# Patient Record
Sex: FEMALE | Race: BLACK OR AFRICAN AMERICAN
[De-identification: names, ages, dates, MRNs, and addresses within clinical notes are randomized per-mention and may not be internally consistent; named-entity substitution may affect disease eponyms.]

---

## 2017-01-12 ENCOUNTER — APPOINTMENT (OUTPATIENT)
Dept: ENDOCRINOLOGY | Facility: CLINIC | Age: 47
End: 2017-01-12

## 2017-01-30 ENCOUNTER — APPOINTMENT (OUTPATIENT)
Dept: INTERNAL MEDICINE | Facility: CLINIC | Age: 47
End: 2017-01-30

## 2017-01-30 VITALS
HEART RATE: 88 BPM | BODY MASS INDEX: 24.99 KG/M2 | DIASTOLIC BLOOD PRESSURE: 83 MMHG | SYSTOLIC BLOOD PRESSURE: 156 MMHG | HEIGHT: 65 IN | WEIGHT: 150 LBS

## 2017-01-30 DIAGNOSIS — Z98.890 OTHER SPECIFIED POSTPROCEDURAL STATES: ICD-10-CM

## 2017-01-30 DIAGNOSIS — Z87.19 OTHER SPECIFIED POSTPROCEDURAL STATES: ICD-10-CM

## 2017-03-02 ENCOUNTER — RESULT REVIEW (OUTPATIENT)
Age: 47
End: 2017-03-02

## 2017-03-02 ENCOUNTER — APPOINTMENT (OUTPATIENT)
Dept: ENDOCRINOLOGY | Facility: CLINIC | Age: 47
End: 2017-03-02

## 2017-03-02 VITALS — WEIGHT: 148 LBS | BODY MASS INDEX: 24.66 KG/M2 | HEIGHT: 65 IN

## 2017-03-02 DIAGNOSIS — F17.200 NICOTINE DEPENDENCE, UNSPECIFIED, UNCOMPLICATED: ICD-10-CM

## 2017-03-03 LAB
ALBUMIN SERPL-MCNC: 4.2 G/DL
ALBUMIN/GLOB SERPL: 1.4
ALP SERPL-CCNC: 64 IU/L
ALT SERPL-CCNC: 72 IU/L
ANION GAP SERPL CALC-SCNC: 12 MEQ/L
AST SERPL-CCNC: 56 IU/L
BASOPHILS # BLD: 0.02 TH/MM3
BASOPHILS NFR BLD: 0.4 %
BILIRUB SERPL-MCNC: 1.3 MG/DL
BUN SERPL-MCNC: 3 MG/DL
BUN/CREAT SERPL: 3.9 %
CALCIUM SERPL-MCNC: 9.7 MG/DL
CHLORIDE SERPL-SCNC: 108 MEQ/L
CO2 SERPL-SCNC: 19 MEQ/L
CREAT SERPL-MCNC: 0.77 MG/DL
EOSINOPHIL # BLD: 0.18 TH/MM3
EOSINOPHIL NFR BLD: 3.3 %
ERYTHROCYTE [DISTWIDTH] IN BLOOD BY AUTOMATED COUNT: 13.2 %
GFR SERPL CREATININE-BSD FRML MDRD: 97
GLUCOSE SERPL-MCNC: 99 MG/DL
GRANULOCYTES # BLD: 3.3 TH/MM3
GRANULOCYTES NFR BLD: 60.2 %
HCT VFR BLD AUTO: 38.2 %
HGB BLD-MCNC: 13.3 G/DL
IMM GRANULOCYTES # BLD: 0.01 TH/MM3
IMM GRANULOCYTES NFR BLD: 0.2 %
LYMPHOCYTES # BLD: 1.53 TH/MM3
LYMPHOCYTES NFR BLD: 27.9 %
MCH RBC QN AUTO: 34.9 PG
MCHC RBC AUTO-ENTMCNC: 34.8 G/DL
MCV RBC AUTO: 100.3 FL
MONOCYTES # BLD: 0.44 TH/MM3
MONOCYTES NFR BLD: 8 %
PLATELET # BLD: 320 TH/MM3
PMV BLD AUTO: 10.6 FL
POTASSIUM SERPL-SCNC: 4.2 MMOL/L
PROT SERPL-MCNC: 7.2 G/DL
RBC # BLD AUTO: 3.81 MIL/MM3
SODIUM SERPL-SCNC: 139 MEQ/L
T3 SERPL-MCNC: 154 NG/DL
T4 FREE SERPL-MCNC: 0.7 NG/DL
TSH SERPL DL<=0.005 MIU/L-ACNC: 2.31 UIU/ML
WBC # BLD: 5.48 TH/MM3

## 2017-04-05 ENCOUNTER — OTHER (OUTPATIENT)
Age: 47
End: 2017-04-05

## 2017-04-19 ENCOUNTER — APPOINTMENT (OUTPATIENT)
Dept: OBGYN | Facility: CLINIC | Age: 47
End: 2017-04-19

## 2017-04-19 ENCOUNTER — OTHER (OUTPATIENT)
Age: 47
End: 2017-04-19

## 2017-04-23 ENCOUNTER — FORM ENCOUNTER (OUTPATIENT)
Age: 47
End: 2017-04-23

## 2017-04-26 ENCOUNTER — CLINICAL ADVICE (OUTPATIENT)
Age: 47
End: 2017-04-26

## 2017-04-28 ENCOUNTER — APPOINTMENT (OUTPATIENT)
Dept: PULMONOLOGY | Facility: CLINIC | Age: 47
End: 2017-04-28

## 2017-04-28 VITALS
DIASTOLIC BLOOD PRESSURE: 75 MMHG | HEART RATE: 98 BPM | SYSTOLIC BLOOD PRESSURE: 121 MMHG | HEIGHT: 65 IN | WEIGHT: 151 LBS | BODY MASS INDEX: 25.16 KG/M2

## 2017-05-10 ENCOUNTER — APPOINTMENT (OUTPATIENT)
Dept: OBGYN | Facility: CLINIC | Age: 47
End: 2017-05-10

## 2017-06-09 ENCOUNTER — APPOINTMENT (OUTPATIENT)
Dept: ANTEPARTUM | Facility: CLINIC | Age: 47
End: 2017-06-09

## 2017-06-12 ENCOUNTER — APPOINTMENT (OUTPATIENT)
Dept: OBGYN | Facility: CLINIC | Age: 47
End: 2017-06-12

## 2017-06-12 VITALS — BODY MASS INDEX: 24.3 KG/M2 | DIASTOLIC BLOOD PRESSURE: 70 MMHG | SYSTOLIC BLOOD PRESSURE: 110 MMHG | WEIGHT: 146 LBS

## 2017-06-15 ENCOUNTER — OUTPATIENT (OUTPATIENT)
Dept: OUTPATIENT SERVICES | Facility: HOSPITAL | Age: 47
LOS: 1 days | Discharge: HOME | End: 2017-06-15

## 2017-06-15 DIAGNOSIS — J45.909 UNSPECIFIED ASTHMA, UNCOMPLICATED: ICD-10-CM

## 2017-06-15 DIAGNOSIS — J44.9 CHRONIC OBSTRUCTIVE PULMONARY DISEASE, UNSPECIFIED: ICD-10-CM

## 2017-06-15 DIAGNOSIS — I26.99 OTHER PULMONARY EMBOLISM WITHOUT ACUTE COR PULMONALE: ICD-10-CM

## 2017-06-15 DIAGNOSIS — D64.9 ANEMIA, UNSPECIFIED: ICD-10-CM

## 2017-06-16 ENCOUNTER — APPOINTMENT (OUTPATIENT)
Dept: INTERNAL MEDICINE | Facility: CLINIC | Age: 47
End: 2017-06-16

## 2017-06-28 DIAGNOSIS — Z01.818 ENCOUNTER FOR OTHER PREPROCEDURAL EXAMINATION: ICD-10-CM

## 2017-06-28 DIAGNOSIS — N92.0 EXCESSIVE AND FREQUENT MENSTRUATION WITH REGULAR CYCLE: ICD-10-CM

## 2017-06-28 DIAGNOSIS — N84.0 POLYP OF CORPUS UTERI: ICD-10-CM

## 2017-07-24 ENCOUNTER — APPOINTMENT (OUTPATIENT)
Dept: INTERNAL MEDICINE | Facility: CLINIC | Age: 47
End: 2017-07-24

## 2017-08-12 ENCOUNTER — EMERGENCY (EMERGENCY)
Facility: HOSPITAL | Age: 47
LOS: 0 days | Discharge: HOME | End: 2017-08-12
Admitting: INTERNAL MEDICINE

## 2017-08-12 DIAGNOSIS — J44.9 CHRONIC OBSTRUCTIVE PULMONARY DISEASE, UNSPECIFIED: ICD-10-CM

## 2017-08-12 DIAGNOSIS — D64.9 ANEMIA, UNSPECIFIED: ICD-10-CM

## 2017-08-12 DIAGNOSIS — I26.99 OTHER PULMONARY EMBOLISM WITHOUT ACUTE COR PULMONALE: ICD-10-CM

## 2017-08-12 DIAGNOSIS — Z79.51 LONG TERM (CURRENT) USE OF INHALED STEROIDS: ICD-10-CM

## 2017-08-12 DIAGNOSIS — K21.9 GASTRO-ESOPHAGEAL REFLUX DISEASE WITHOUT ESOPHAGITIS: ICD-10-CM

## 2017-08-12 DIAGNOSIS — R06.02 SHORTNESS OF BREATH: ICD-10-CM

## 2017-08-12 DIAGNOSIS — Z79.899 OTHER LONG TERM (CURRENT) DRUG THERAPY: ICD-10-CM

## 2017-08-12 DIAGNOSIS — J44.1 CHRONIC OBSTRUCTIVE PULMONARY DISEASE WITH (ACUTE) EXACERBATION: ICD-10-CM

## 2017-08-12 DIAGNOSIS — Z91.041 RADIOGRAPHIC DYE ALLERGY STATUS: ICD-10-CM

## 2017-08-12 DIAGNOSIS — F17.210 NICOTINE DEPENDENCE, CIGARETTES, UNCOMPLICATED: ICD-10-CM

## 2017-08-12 DIAGNOSIS — F41.8 OTHER SPECIFIED ANXIETY DISORDERS: ICD-10-CM

## 2017-08-12 DIAGNOSIS — M25.511 PAIN IN RIGHT SHOULDER: ICD-10-CM

## 2017-08-12 DIAGNOSIS — J45.909 UNSPECIFIED ASTHMA, UNCOMPLICATED: ICD-10-CM

## 2017-08-28 ENCOUNTER — APPOINTMENT (OUTPATIENT)
Dept: INTERNAL MEDICINE | Facility: CLINIC | Age: 47
End: 2017-08-28

## 2017-10-05 ENCOUNTER — APPOINTMENT (OUTPATIENT)
Dept: INTERNAL MEDICINE | Facility: CLINIC | Age: 47
End: 2017-10-05

## 2017-10-05 ENCOUNTER — OUTPATIENT (OUTPATIENT)
Dept: OUTPATIENT SERVICES | Facility: HOSPITAL | Age: 47
LOS: 1 days | Discharge: HOME | End: 2017-10-05

## 2017-10-05 VITALS
SYSTOLIC BLOOD PRESSURE: 118 MMHG | HEIGHT: 65 IN | DIASTOLIC BLOOD PRESSURE: 77 MMHG | BODY MASS INDEX: 25.33 KG/M2 | HEART RATE: 106 BPM | WEIGHT: 152 LBS

## 2017-10-05 DIAGNOSIS — I26.99 OTHER PULMONARY EMBOLISM WITHOUT ACUTE COR PULMONALE: ICD-10-CM

## 2017-10-05 DIAGNOSIS — J45.909 UNSPECIFIED ASTHMA, UNCOMPLICATED: ICD-10-CM

## 2017-10-05 DIAGNOSIS — J44.9 CHRONIC OBSTRUCTIVE PULMONARY DISEASE, UNSPECIFIED: ICD-10-CM

## 2017-10-05 DIAGNOSIS — D64.9 ANEMIA, UNSPECIFIED: ICD-10-CM

## 2017-10-05 RX ORDER — CHLORHEXIDINE GLUCONATE 4 %
325 (65 FE) LIQUID (ML) TOPICAL 3 TIMES DAILY
Qty: 90 | Refills: 6 | Status: COMPLETED | COMMUNITY
Start: 2017-01-30 | End: 2017-10-05

## 2017-10-05 RX ORDER — SERTRALINE HYDROCHLORIDE 50 MG/1
50 TABLET, FILM COATED ORAL
Qty: 30 | Refills: 0 | Status: COMPLETED | COMMUNITY
Start: 2017-01-31 | End: 2017-10-05

## 2017-10-10 DIAGNOSIS — N84.0 POLYP OF CORPUS UTERI: ICD-10-CM

## 2017-10-10 DIAGNOSIS — E05.90 THYROTOXICOSIS, UNSPECIFIED WITHOUT THYROTOXIC CRISIS OR STORM: ICD-10-CM

## 2017-10-10 DIAGNOSIS — G56.00 CARPAL TUNNEL SYNDROME, UNSPECIFIED UPPER LIMB: ICD-10-CM

## 2017-10-26 LAB — CYTOLOGY CVX/VAG DOC THIN PREP: NORMAL

## 2017-10-30 LAB
ALBUMIN SERPL-MCNC: 3.8 G/DL
ALBUMIN/GLOB SERPL: 1.19
ALP SERPL-CCNC: 62 IU/L
ALT SERPL-CCNC: 26 IU/L
ANION GAP SERPL CALC-SCNC: 7 MEQ/L
AST SERPL-CCNC: 25 IU/L
BASOPHILS # BLD: 0.02 TH/MM3
BASOPHILS NFR BLD: 0.4 %
BILIRUB SERPL-MCNC: 0.8 MG/DL
BUN SERPL-MCNC: 6 MG/DL
BUN/CREAT SERPL: 9.2 %
CALCIUM SERPL-MCNC: 9.5 MG/DL
CHLORIDE SERPL-SCNC: 106 MEQ/L
CHOLEST SERPL-MCNC: 132 MG/DL
CO2 SERPL-SCNC: 25 MEQ/L
CREAT SERPL-MCNC: 0.65 MG/DL
DIFFERENTIAL METHOD BLD: NORMAL
EOSINOPHIL # BLD: 0.29 TH/MM3
EOSINOPHIL NFR BLD: 5.8 %
ERYTHROCYTE [DISTWIDTH] IN BLOOD BY AUTOMATED COUNT: 13.1 %
ESTIMATED AVERGAGE GLUCOSE (NORTH): 94 MG/DL
GFR SERPL CREATININE-BSD FRML MDRD: 117
GLUCOSE SERPL-MCNC: 91 MG/DL
GRANULOCYTES # BLD: 2.3 TH/MM3
GRANULOCYTES NFR BLD: 45.9 %
HBA1C MFR BLD: 4.9 %
HCT VFR BLD AUTO: 40.9 %
HDLC SERPL-MCNC: 97 MG/DL
HDLC SERPL: 1.36
HGB BLD-MCNC: 13.4 G/DL
IMM GRANULOCYTES # BLD: 0 TH/MM3
IMM GRANULOCYTES NFR BLD: 0 %
LDLC SERPL DIRECT ASSAY-MCNC: 19 MG/DL
LYMPHOCYTES # BLD: 1.98 TH/MM3
LYMPHOCYTES NFR BLD: 39.5 %
MCH RBC QN AUTO: 31.7 PG
MCHC RBC AUTO-ENTMCNC: 32.8 G/DL
MCV RBC AUTO: 96.7 FL
MONOCYTES # BLD: 0.42 TH/MM3
MONOCYTES NFR BLD: 8.4 %
PLATELET # BLD: 268 TH/MM3
PMV BLD AUTO: 10.5 FL
POTASSIUM SERPL-SCNC: 4.2 MMOL/L
PROT SERPL-MCNC: 7 G/DL
RBC # BLD AUTO: 4.23 MIL/MM3
SODIUM SERPL-SCNC: 138 MEQ/L
TRIGL SERPL-MCNC: 73 MG/DL
VLDLC SERPL-MCNC: 14 MG/DL
WBC # BLD: 5.01 TH/MM3

## 2017-10-31 LAB
25(OH)D3 SERPL-MCNC: 29 NG/ML
VITAMIN D2 SERPL-MCNC: <4 NG/ML
VITAMIN D3 SERPL-MCNC: 29 NG/ML

## 2017-11-02 ENCOUNTER — APPOINTMENT (OUTPATIENT)
Dept: INTERNAL MEDICINE | Facility: CLINIC | Age: 47
End: 2017-11-02

## 2017-11-02 ENCOUNTER — OUTPATIENT (OUTPATIENT)
Dept: OUTPATIENT SERVICES | Facility: HOSPITAL | Age: 47
LOS: 1 days | Discharge: HOME | End: 2017-11-02

## 2017-11-02 VITALS
SYSTOLIC BLOOD PRESSURE: 114 MMHG | HEIGHT: 65 IN | BODY MASS INDEX: 24.83 KG/M2 | HEART RATE: 114 BPM | DIASTOLIC BLOOD PRESSURE: 74 MMHG | WEIGHT: 149 LBS

## 2017-11-02 DIAGNOSIS — F33.2 MAJOR DEPRESSIVE DISORDER, RECURRENT SEVERE W/OUT PSYCHOTIC FEATURES: ICD-10-CM

## 2017-11-02 DIAGNOSIS — J44.9 CHRONIC OBSTRUCTIVE PULMONARY DISEASE, UNSPECIFIED: ICD-10-CM

## 2017-11-02 DIAGNOSIS — R20.0 ANESTHESIA OF SKIN: ICD-10-CM

## 2017-11-02 DIAGNOSIS — R20.2 ANESTHESIA OF SKIN: ICD-10-CM

## 2017-11-02 DIAGNOSIS — F14.11 COCAINE ABUSE, IN REMISSION: ICD-10-CM

## 2017-11-02 DIAGNOSIS — I26.99 OTHER PULMONARY EMBOLISM WITHOUT ACUTE COR PULMONALE: ICD-10-CM

## 2017-11-02 DIAGNOSIS — D64.9 ANEMIA, UNSPECIFIED: ICD-10-CM

## 2017-11-02 DIAGNOSIS — J45.909 UNSPECIFIED ASTHMA, UNCOMPLICATED: ICD-10-CM

## 2017-11-02 DIAGNOSIS — N92.6 IRREGULAR MENSTRUATION, UNSPECIFIED: ICD-10-CM

## 2017-11-02 DIAGNOSIS — N93.9 IRREGULAR MENSTRUATION, UNSPECIFIED: ICD-10-CM

## 2017-11-02 RX ORDER — OMEPRAZOLE MAGNESIUM 20 MG/1
20 TABLET, DELAYED RELEASE ORAL DAILY
Qty: 30 | Refills: 3 | Status: DISCONTINUED | COMMUNITY
Start: 2017-10-12 | End: 2017-11-02

## 2017-11-02 RX ORDER — ESOMEPRAZOLE MAGNESIUM 20 MG/1
20 CAPSULE, DELAYED RELEASE ORAL DAILY
Qty: 28 | Refills: 0 | Status: DISCONTINUED | COMMUNITY
Start: 2017-10-11 | End: 2017-11-02

## 2017-11-03 DIAGNOSIS — R01.1 CARDIAC MURMUR, UNSPECIFIED: ICD-10-CM

## 2017-11-03 DIAGNOSIS — J45.998 OTHER ASTHMA: ICD-10-CM

## 2017-11-03 DIAGNOSIS — G56.00 CARPAL TUNNEL SYNDROME, UNSPECIFIED UPPER LIMB: ICD-10-CM

## 2017-11-10 ENCOUNTER — APPOINTMENT (OUTPATIENT)
Dept: PULMONOLOGY | Facility: CLINIC | Age: 47
End: 2017-11-10

## 2017-12-25 ENCOUNTER — EMERGENCY (EMERGENCY)
Facility: HOSPITAL | Age: 47
LOS: 0 days | Discharge: HOME | End: 2017-12-25
Admitting: INTERNAL MEDICINE

## 2017-12-25 DIAGNOSIS — Z91.041 RADIOGRAPHIC DYE ALLERGY STATUS: ICD-10-CM

## 2017-12-25 DIAGNOSIS — J45.909 UNSPECIFIED ASTHMA, UNCOMPLICATED: ICD-10-CM

## 2017-12-25 DIAGNOSIS — F17.200 NICOTINE DEPENDENCE, UNSPECIFIED, UNCOMPLICATED: ICD-10-CM

## 2017-12-25 DIAGNOSIS — R05 COUGH: ICD-10-CM

## 2017-12-25 DIAGNOSIS — Z79.51 LONG TERM (CURRENT) USE OF INHALED STEROIDS: ICD-10-CM

## 2017-12-25 DIAGNOSIS — D64.9 ANEMIA, UNSPECIFIED: ICD-10-CM

## 2017-12-25 DIAGNOSIS — Z79.899 OTHER LONG TERM (CURRENT) DRUG THERAPY: ICD-10-CM

## 2017-12-25 DIAGNOSIS — J06.9 ACUTE UPPER RESPIRATORY INFECTION, UNSPECIFIED: ICD-10-CM

## 2017-12-25 DIAGNOSIS — J44.9 CHRONIC OBSTRUCTIVE PULMONARY DISEASE, UNSPECIFIED: ICD-10-CM

## 2017-12-25 DIAGNOSIS — I26.99 OTHER PULMONARY EMBOLISM WITHOUT ACUTE COR PULMONALE: ICD-10-CM

## 2017-12-25 DIAGNOSIS — F32.9 MAJOR DEPRESSIVE DISORDER, SINGLE EPISODE, UNSPECIFIED: ICD-10-CM

## 2017-12-25 DIAGNOSIS — K21.9 GASTRO-ESOPHAGEAL REFLUX DISEASE WITHOUT ESOPHAGITIS: ICD-10-CM

## 2018-02-07 ENCOUNTER — APPOINTMENT (OUTPATIENT)
Dept: ENDOCRINOLOGY | Facility: CLINIC | Age: 48
End: 2018-02-07

## 2018-02-10 ENCOUNTER — EMERGENCY (EMERGENCY)
Facility: HOSPITAL | Age: 48
LOS: 0 days | Discharge: HOME | End: 2018-02-11
Attending: EMERGENCY MEDICINE | Admitting: INTERNAL MEDICINE

## 2018-02-10 VITALS
DIASTOLIC BLOOD PRESSURE: 66 MMHG | RESPIRATION RATE: 22 BRPM | TEMPERATURE: 97 F | SYSTOLIC BLOOD PRESSURE: 112 MMHG | HEART RATE: 114 BPM | OXYGEN SATURATION: 95 %

## 2018-02-10 DIAGNOSIS — E03.9 HYPOTHYROIDISM, UNSPECIFIED: ICD-10-CM

## 2018-02-10 DIAGNOSIS — F17.210 NICOTINE DEPENDENCE, CIGARETTES, UNCOMPLICATED: ICD-10-CM

## 2018-02-10 DIAGNOSIS — R06.02 SHORTNESS OF BREATH: ICD-10-CM

## 2018-02-10 DIAGNOSIS — J44.1 CHRONIC OBSTRUCTIVE PULMONARY DISEASE WITH (ACUTE) EXACERBATION: ICD-10-CM

## 2018-02-10 DIAGNOSIS — J10.1 INFLUENZA DUE TO OTHER IDENTIFIED INFLUENZA VIRUS WITH OTHER RESPIRATORY MANIFESTATIONS: ICD-10-CM

## 2018-02-11 VITALS
SYSTOLIC BLOOD PRESSURE: 129 MMHG | HEART RATE: 131 BPM | DIASTOLIC BLOOD PRESSURE: 63 MMHG | OXYGEN SATURATION: 95 % | RESPIRATION RATE: 24 BRPM | TEMPERATURE: 99 F

## 2018-02-11 LAB
BASOPHILS # BLD AUTO: 0.04 K/UL — SIGNIFICANT CHANGE UP (ref 0–0.2)
BASOPHILS NFR BLD AUTO: 0.7 % — SIGNIFICANT CHANGE UP (ref 0–1)
CK MB BLD-MCNC: 2 % — SIGNIFICANT CHANGE UP (ref 0–4)
CK MB CFR SERPL CALC: 2.7 NG/ML — SIGNIFICANT CHANGE UP (ref 0.6–6.3)
CK SERPL-CCNC: 141 U/L — SIGNIFICANT CHANGE UP (ref 0–225)
EOSINOPHIL # BLD AUTO: 0.25 K/UL — SIGNIFICANT CHANGE UP (ref 0–0.7)
EOSINOPHIL NFR BLD AUTO: 4.5 % — SIGNIFICANT CHANGE UP (ref 0–8)
FLU A RESULT: POSITIVE
FLU A RESULT: POSITIVE
FLUAV AG NPH QL: POSITIVE
FLUBV AG NPH QL: NEGATIVE — SIGNIFICANT CHANGE UP
HCT VFR BLD CALC: 34.4 % — LOW (ref 37–47)
HGB BLD-MCNC: 11.6 G/DL — LOW (ref 14–18)
IMM GRANULOCYTES NFR BLD AUTO: 0.2 % — SIGNIFICANT CHANGE UP (ref 0.1–0.3)
LYMPHOCYTES # BLD AUTO: 1.32 K/UL — SIGNIFICANT CHANGE UP (ref 1.2–3.4)
LYMPHOCYTES # BLD AUTO: 23.9 % — SIGNIFICANT CHANGE UP (ref 20.5–51.1)
MAGNESIUM SERPL-MCNC: 1.9 MG/DL — SIGNIFICANT CHANGE UP (ref 1.8–2.4)
MCHC RBC-ENTMCNC: 30.6 PG — SIGNIFICANT CHANGE UP (ref 27–31)
MCHC RBC-ENTMCNC: 33.7 G/DL — SIGNIFICANT CHANGE UP (ref 32–37)
MCV RBC AUTO: 90.8 FL — SIGNIFICANT CHANGE UP (ref 81–91)
MONOCYTES # BLD AUTO: 0.56 K/UL — SIGNIFICANT CHANGE UP (ref 0.1–0.6)
MONOCYTES NFR BLD AUTO: 10.1 % — HIGH (ref 1.7–9.3)
NEUTROPHILS # BLD AUTO: 3.34 K/UL — SIGNIFICANT CHANGE UP (ref 1.4–6.5)
NEUTROPHILS NFR BLD AUTO: 60.6 % — SIGNIFICANT CHANGE UP (ref 42.2–75.2)
PLATELET # BLD AUTO: 247 K/UL — SIGNIFICANT CHANGE UP (ref 130–400)
RBC # BLD: 3.79 M/UL — LOW (ref 4.2–5.4)
RBC # FLD: 12.8 % — SIGNIFICANT CHANGE UP (ref 11.5–14.5)
TROPONIN I SERPL-MCNC: <0.02 NG/ML — SIGNIFICANT CHANGE UP (ref 0–0.05)
WBC # BLD: 5.52 K/UL — SIGNIFICANT CHANGE UP (ref 4.8–10.8)
WBC # FLD AUTO: 5.52 K/UL — SIGNIFICANT CHANGE UP (ref 4.8–10.8)

## 2018-02-11 RX ORDER — AZITHROMYCIN 500 MG/1
500 TABLET, FILM COATED ORAL ONCE
Qty: 0 | Refills: 0 | Status: DISCONTINUED | OUTPATIENT
Start: 2018-02-11 | End: 2018-02-11

## 2018-02-11 RX ORDER — IPRATROPIUM/ALBUTEROL SULFATE 18-103MCG
3 AEROSOL WITH ADAPTER (GRAM) INHALATION ONCE
Qty: 0 | Refills: 0 | Status: COMPLETED | OUTPATIENT
Start: 2018-02-11 | End: 2018-02-11

## 2018-02-11 RX ORDER — CEFTRIAXONE 500 MG/1
1 INJECTION, POWDER, FOR SOLUTION INTRAMUSCULAR; INTRAVENOUS ONCE
Qty: 0 | Refills: 0 | Status: DISCONTINUED | OUTPATIENT
Start: 2018-02-11 | End: 2018-02-11

## 2018-02-11 RX ORDER — SODIUM CHLORIDE 9 MG/ML
1000 INJECTION INTRAMUSCULAR; INTRAVENOUS; SUBCUTANEOUS ONCE
Qty: 0 | Refills: 0 | Status: COMPLETED | OUTPATIENT
Start: 2018-02-11 | End: 2018-02-11

## 2018-02-11 RX ORDER — ALBUTEROL 90 UG/1
2.5 AEROSOL, METERED ORAL ONCE
Qty: 0 | Refills: 0 | Status: COMPLETED | OUTPATIENT
Start: 2018-02-11 | End: 2018-02-11

## 2018-02-11 RX ADMIN — Medication 60 MILLIGRAM(S): at 01:58

## 2018-02-11 RX ADMIN — ALBUTEROL 2.5 MILLIGRAM(S): 90 AEROSOL, METERED ORAL at 04:58

## 2018-02-11 RX ADMIN — Medication 3 MILLILITER(S): at 01:59

## 2018-02-11 RX ADMIN — SODIUM CHLORIDE 1000 MILLILITER(S): 9 INJECTION INTRAMUSCULAR; INTRAVENOUS; SUBCUTANEOUS at 01:58

## 2018-02-11 NOTE — ED PROVIDER NOTE - OBJECTIVE STATEMENT
47 yr old female, PMH of COPD, presenting with dry cough, wheezing, and SOB since Thursday, denies any fevers, chills, rash, chest pain, productive sputum, N/V/D, abd pain, or LE swelling. Has never been intubated for her COPD, sees Dr. Bojorquez. Used to smoke 3ppd, now only 1 pack every 3 days

## 2018-02-11 NOTE — ED PROVIDER NOTE - NS ED ROS FT
Review of Systems:  	•	CONSTITUTIONAL - no fever, no diaphoresis, no chills  	•	EYES - no eye pain, no blurry vision  	•	ENT - no change in hearing, no sore throat, no ear pain or tinnitus  	•	RESPIRATORY - +SOB  	•	CARDIAC - no chest pain, no palpitations  	•	GI - no abd pain, no nausea, no vomiting, no diarrhea, no constipation  	•	GENITO-URINARY - no discharge, no dysuria; no hematuria  	•	MUSCULOSKELETAL - no joint paint, no swelling, no redness

## 2018-02-11 NOTE — ED PROVIDER NOTE - ATTENDING CONTRIBUTION TO CARE
47 y.o. female comes in c/o cough, wheezing, SOB, and generalized fatigue. On exam, pt in NAD, AAOx3, head NC/AT, CN II-XII intact, lungs wheezing B/L, CV S1S2 regular, abdomen soft/NT/ND/(+)BS, ext (-) edema. motor 5/5x4, sensation intact. Will do XR, labs, and reevaluate.

## 2018-02-11 NOTE — ED ADULT NURSE NOTE - PMH
Anemia    COPD (chronic obstructive pulmonary disease)    GERD (gastroesophageal reflux disease)    Hyperthyroidism

## 2018-02-11 NOTE — ED ADULT NURSE REASSESSMENT NOTE - NS ED NURSE REASSESS COMMENT FT1
pt continues to have shortness of breath, placed on 2L NC. no change after treatment. wheezing still present upon auscultation. will continue to monitor

## 2018-02-11 NOTE — ED PROVIDER NOTE - PHYSICAL EXAMINATION
Alert, NAD, WDWN, non-toxic appearing  PERRL, EOMI, normal pupils, no icterus, normal external ENT, pink/moist membranes, pharynx normal  Airway intact, normal resp effort w/o tachypnea, speaking full sentences, lung with good air entry bilaterally, minimal wheezing on expiration  CVS1S2, RRR, no m/g/r, no JVD, 2+ pulses b/l, no edema, warm/well-perfused  Abdomen soft, no tender/dist/guard/rebound, no CVA tender  FROM all 4 ext, no bony tender/deform, skin warm/dry, no rash  AAOx3, CN 2-12 intact, normal motor, normal sensory, normal gait

## 2018-02-12 LAB
ALBUMIN SERPL ELPH-MCNC: 3.5 G/DL — SIGNIFICANT CHANGE UP (ref 3–5.5)
ALP SERPL-CCNC: 85 U/L — SIGNIFICANT CHANGE UP (ref 30–115)
ALT FLD-CCNC: 28 U/L — SIGNIFICANT CHANGE UP (ref 0–41)
ANION GAP SERPL CALC-SCNC: 11 MMOL/L — SIGNIFICANT CHANGE UP (ref 7–14)
AST SERPL-CCNC: 26 U/L — SIGNIFICANT CHANGE UP (ref 0–41)
BILIRUB DIRECT SERPL-MCNC: SIGNIFICANT CHANGE UP MG/DL (ref 0–0.2)
BILIRUB INDIRECT FLD-MCNC: SIGNIFICANT CHANGE UP MG/DL
BILIRUB SERPL-MCNC: 0.4 MG/DL — SIGNIFICANT CHANGE UP (ref 0.2–1.2)
BUN SERPL-MCNC: <5 MG/DL — LOW (ref 10–20)
CALCIUM SERPL-MCNC: 8.5 MG/DL — SIGNIFICANT CHANGE UP (ref 8.5–10.1)
CHLORIDE SERPL-SCNC: 102 MMOL/L — SIGNIFICANT CHANGE UP (ref 98–110)
CO2 SERPL-SCNC: 25 MMOL/L — SIGNIFICANT CHANGE UP (ref 17–32)
CREAT SERPL-MCNC: 0.5 MG/DL — LOW (ref 0.7–1.5)
GLUCOSE SERPL-MCNC: 123 MG/DL — HIGH (ref 70–110)
POTASSIUM SERPL-MCNC: 3.7 MMOL/L — SIGNIFICANT CHANGE UP (ref 3.5–5)
POTASSIUM SERPL-SCNC: 3.7 MMOL/L — SIGNIFICANT CHANGE UP (ref 3.5–5)
PROT SERPL-MCNC: 6.8 G/DL — SIGNIFICANT CHANGE UP (ref 6–8)
SODIUM SERPL-SCNC: 138 MMOL/L — SIGNIFICANT CHANGE UP (ref 135–146)

## 2018-04-04 ENCOUNTER — RX RENEWAL (OUTPATIENT)
Age: 48
End: 2018-04-04

## 2018-06-21 ENCOUNTER — OTHER (OUTPATIENT)
Age: 48
End: 2018-06-21

## 2018-06-25 ENCOUNTER — TRANSCRIPTION ENCOUNTER (OUTPATIENT)
Age: 48
End: 2018-06-25

## 2018-07-20 ENCOUNTER — TRANSCRIPTION ENCOUNTER (OUTPATIENT)
Age: 48
End: 2018-07-20

## 2018-07-20 PROBLEM — D64.9 ANEMIA, UNSPECIFIED: Chronic | Status: ACTIVE | Noted: 2018-02-11

## 2018-07-20 PROBLEM — E05.90 THYROTOXICOSIS, UNSPECIFIED WITHOUT THYROTOXIC CRISIS OR STORM: Chronic | Status: ACTIVE | Noted: 2018-02-11

## 2018-07-20 PROBLEM — K21.9 GASTRO-ESOPHAGEAL REFLUX DISEASE WITHOUT ESOPHAGITIS: Chronic | Status: ACTIVE | Noted: 2018-02-11

## 2018-07-20 PROBLEM — J44.9 CHRONIC OBSTRUCTIVE PULMONARY DISEASE, UNSPECIFIED: Chronic | Status: ACTIVE | Noted: 2018-02-11

## 2018-07-25 ENCOUNTER — RX RENEWAL (OUTPATIENT)
Age: 48
End: 2018-07-25

## 2018-08-01 ENCOUNTER — APPOINTMENT (OUTPATIENT)
Dept: ENDOCRINOLOGY | Facility: CLINIC | Age: 48
End: 2018-08-01

## 2018-08-02 ENCOUNTER — TRANSCRIPTION ENCOUNTER (OUTPATIENT)
Age: 48
End: 2018-08-02

## 2018-08-02 ENCOUNTER — EMERGENCY (EMERGENCY)
Facility: HOSPITAL | Age: 48
LOS: 0 days | Discharge: HOME | End: 2018-08-02
Attending: EMERGENCY MEDICINE | Admitting: EMERGENCY MEDICINE

## 2018-08-02 VITALS
DIASTOLIC BLOOD PRESSURE: 77 MMHG | SYSTOLIC BLOOD PRESSURE: 137 MMHG | HEART RATE: 125 BPM | TEMPERATURE: 97 F | OXYGEN SATURATION: 99 % | RESPIRATION RATE: 20 BRPM

## 2018-08-02 DIAGNOSIS — J44.9 CHRONIC OBSTRUCTIVE PULMONARY DISEASE, UNSPECIFIED: ICD-10-CM

## 2018-08-02 DIAGNOSIS — E03.9 HYPOTHYROIDISM, UNSPECIFIED: ICD-10-CM

## 2018-08-02 DIAGNOSIS — K21.9 GASTRO-ESOPHAGEAL REFLUX DISEASE WITHOUT ESOPHAGITIS: ICD-10-CM

## 2018-08-02 DIAGNOSIS — R06.02 SHORTNESS OF BREATH: ICD-10-CM

## 2018-08-02 DIAGNOSIS — Z87.891 PERSONAL HISTORY OF NICOTINE DEPENDENCE: ICD-10-CM

## 2018-08-02 RX ORDER — IPRATROPIUM/ALBUTEROL SULFATE 18-103MCG
3 AEROSOL WITH ADAPTER (GRAM) INHALATION
Qty: 0 | Refills: 0 | Status: COMPLETED | OUTPATIENT
Start: 2018-08-02 | End: 2018-08-02

## 2018-08-02 RX ORDER — IPRATROPIUM/ALBUTEROL SULFATE 18-103MCG
3 AEROSOL WITH ADAPTER (GRAM) INHALATION ONCE
Qty: 0 | Refills: 0 | Status: COMPLETED | OUTPATIENT
Start: 2018-08-02 | End: 2018-08-02

## 2018-08-02 RX ORDER — IPRATROPIUM/ALBUTEROL SULFATE 18-103MCG
3 AEROSOL WITH ADAPTER (GRAM) INHALATION
Qty: 9 | Refills: 0 | OUTPATIENT
Start: 2018-08-02 | End: 2018-08-04

## 2018-08-02 RX ADMIN — Medication 3 MILLILITER(S): at 17:00

## 2018-08-02 RX ADMIN — Medication 3 MILLILITER(S): at 16:00

## 2018-08-02 RX ADMIN — Medication 3 MILLILITER(S): at 18:55

## 2018-08-02 RX ADMIN — Medication 3 MILLILITER(S): at 16:19

## 2018-08-02 NOTE — ED PROVIDER NOTE - CARE PLAN
Principal Discharge DX:	COPD (chronic obstructive pulmonary disease) Principal Discharge DX:	Dyspnea  Secondary Diagnosis:	COPD (chronic obstructive pulmonary disease)

## 2018-08-02 NOTE — ED PROVIDER NOTE - PROGRESS NOTE DETAILS
ATTENDING NOTE: 48 y/o female with hx of COPD, recently quit smoking, c/o gradual onset SOB associated with non-productive cough x 3 days. No fever. NO chest pain. No vomiting. No recent travel/immobilization/surgery. No calf pain or swelling. O/E: Mild respiratory distress. No pallor, no jaundice. Neck supple, no meningeal signs. Lungs with diffuse wheezes b/l. Moving good air. S1 S2 tachycardic, no murmur. ABD soft, no tenderness. No pedal edema, no calf tenderness. No skin rash. No neurologic deficits.   Imp: Likely COPD exacerbation, no sign of PE. Unlikely to be CHF.  A/P: Nebulizers, already received prednisone 60 mg at urgent care center, check EKG, CXR. Will re-assess. Pt says she feels better. Wheezing resolved on repeat exam. States she would like to go home at this time. Has f/u with pulmonologist Dr. Bojorquez. Will give course of prednisone and instruct to f/u with pmd and pulm. Instructed to return if sx change or worsen.

## 2018-08-02 NOTE — ED PROVIDER NOTE - NS ED ROS FT
Eyes:  No visual changes, eye pain or discharge.  ENMT:  No hearing changes, pain, discharge or infections. No neck pain or stiffness.  Cardiac:  SOB. No chest pain or edema.   Respiratory: Cough, sob.   GI:  No nausea, vomiting, diarrhea or abdominal pain.  :  No dysuria, frequency or burning.  MS:  No myalgia, muscle weakness, joint pain or back pain.  Neuro:  No headache or weakness.  No LOC.  Skin:  No skin rash.   Endocrine: No history of thyroid disease or diabetes.

## 2018-08-02 NOTE — ED PROVIDER NOTE - MEDICAL DECISION MAKING DETAILS
Symptoms improved. Breathign comfortably. Lungs CTA b/l. . Walked around ED without exacerbation. D/C'ed with albuterol/atrovent via nebulizer, prednisone. Will f/u with PMD Fabrizio and pulmonary Dr. Bojorquez.

## 2018-08-02 NOTE — ED PROVIDER NOTE - PHYSICAL EXAMINATION
CONSTITUTIONAL: Well-developed; well-nourished; in no acute distress.   SKIN: warm, dry  HEAD: Normocephalic; atraumatic.  EYES: normal sclera and conjunctiva   ENT: No nasal discharge; airway clear.  NECK: Supple; non tender.  CARD: S1, S2 normal; no murmurs, gallops, or rubs. Regular rate and rhythm.   RESP: BL diffuse expiratory wheezing. No retractions, accessory muscle use, nasal flaring.   ABD: soft ntnd  EXT: Normal ROM.  No clubbing, cyanosis or edema. No posterior calf ttp.   LYMPH: No acute cervical adenopathy.  NEURO: Alert, oriented, grossly unremarkable  PSYCH: Cooperative, appropriate.

## 2018-08-02 NOTE — ED PROVIDER NOTE - OBJECTIVE STATEMENT
47 y f pmh copd pw sob. SOB x4 days. Associated with nonproductive cough. Has tried copd medications including rescue inhaler with no relief. [place] [facility] 60 [place] prednisone and told to come to ED. Denies cp, palpitations, abd pain, n/v, headache, LH, sob. Denies dvt/pe, recent travel/immobilization, recent surgery.

## 2018-08-17 ENCOUNTER — TRANSCRIPTION ENCOUNTER (OUTPATIENT)
Age: 48
End: 2018-08-17

## 2018-08-23 ENCOUNTER — APPOINTMENT (OUTPATIENT)
Dept: INTERNAL MEDICINE | Facility: CLINIC | Age: 48
End: 2018-08-23

## 2018-08-23 ENCOUNTER — OUTPATIENT (OUTPATIENT)
Dept: OUTPATIENT SERVICES | Facility: HOSPITAL | Age: 48
LOS: 1 days | Discharge: HOME | End: 2018-08-23

## 2018-08-23 VITALS
HEIGHT: 65 IN | BODY MASS INDEX: 31.49 KG/M2 | DIASTOLIC BLOOD PRESSURE: 93 MMHG | HEART RATE: 116 BPM | SYSTOLIC BLOOD PRESSURE: 162 MMHG | WEIGHT: 189 LBS

## 2018-09-03 ENCOUNTER — INPATIENT (INPATIENT)
Facility: HOSPITAL | Age: 48
LOS: 0 days | Discharge: HOME | End: 2018-09-04
Attending: HOSPITALIST | Admitting: HOSPITALIST
Payer: COMMERCIAL

## 2018-09-03 ENCOUNTER — TRANSCRIPTION ENCOUNTER (OUTPATIENT)
Age: 48
End: 2018-09-03

## 2018-09-03 VITALS
OXYGEN SATURATION: 99 % | TEMPERATURE: 97 F | SYSTOLIC BLOOD PRESSURE: 169 MMHG | DIASTOLIC BLOOD PRESSURE: 106 MMHG | RESPIRATION RATE: 24 BRPM | WEIGHT: 192.02 LBS | HEART RATE: 111 BPM

## 2018-09-03 LAB
ALBUMIN SERPL ELPH-MCNC: 4.2 G/DL — SIGNIFICANT CHANGE UP (ref 3.5–5.2)
ALP SERPL-CCNC: 77 U/L — SIGNIFICANT CHANGE UP (ref 30–115)
ALT FLD-CCNC: 14 U/L — SIGNIFICANT CHANGE UP (ref 0–41)
ANION GAP SERPL CALC-SCNC: 16 MMOL/L — HIGH (ref 7–14)
APTT BLD: 26 SEC — LOW (ref 27–39.2)
AST SERPL-CCNC: 17 U/L — SIGNIFICANT CHANGE UP (ref 0–41)
BASOPHILS # BLD AUTO: 0.06 K/UL — SIGNIFICANT CHANGE UP (ref 0–0.2)
BASOPHILS NFR BLD AUTO: 0.6 % — SIGNIFICANT CHANGE UP (ref 0–1)
BILIRUB SERPL-MCNC: 0.3 MG/DL — SIGNIFICANT CHANGE UP (ref 0.2–1.2)
BUN SERPL-MCNC: 6 MG/DL — LOW (ref 10–20)
CALCIUM SERPL-MCNC: 8.8 MG/DL — SIGNIFICANT CHANGE UP (ref 8.5–10.1)
CHLORIDE SERPL-SCNC: 97 MMOL/L — LOW (ref 98–110)
CO2 SERPL-SCNC: 25 MMOL/L — SIGNIFICANT CHANGE UP (ref 17–32)
CREAT SERPL-MCNC: 0.8 MG/DL — SIGNIFICANT CHANGE UP (ref 0.7–1.5)
D DIMER BLD IA.RAPID-MCNC: 123 NG/ML DDU — SIGNIFICANT CHANGE UP (ref 0–230)
EOSINOPHIL # BLD AUTO: 0.25 K/UL — SIGNIFICANT CHANGE UP (ref 0–0.7)
EOSINOPHIL NFR BLD AUTO: 2.3 % — SIGNIFICANT CHANGE UP (ref 0–8)
GAS PNL BLDV: SIGNIFICANT CHANGE UP
GLUCOSE SERPL-MCNC: 99 MG/DL — SIGNIFICANT CHANGE UP (ref 70–99)
HCG SERPL QL: NEGATIVE — SIGNIFICANT CHANGE UP
HCT VFR BLD CALC: 32.8 % — LOW (ref 37–47)
HGB BLD-MCNC: 10.6 G/DL — LOW (ref 12–16)
IMM GRANULOCYTES NFR BLD AUTO: 0.5 % — HIGH (ref 0.1–0.3)
INR BLD: 0.93 RATIO — SIGNIFICANT CHANGE UP (ref 0.65–1.3)
LYMPHOCYTES # BLD AUTO: 3.64 K/UL — HIGH (ref 1.2–3.4)
LYMPHOCYTES # BLD AUTO: 34 % — SIGNIFICANT CHANGE UP (ref 20.5–51.1)
MAGNESIUM SERPL-MCNC: 1.9 MG/DL — SIGNIFICANT CHANGE UP (ref 1.8–2.4)
MCHC RBC-ENTMCNC: 27.3 PG — SIGNIFICANT CHANGE UP (ref 27–31)
MCHC RBC-ENTMCNC: 32.3 G/DL — SIGNIFICANT CHANGE UP (ref 32–37)
MCV RBC AUTO: 84.5 FL — SIGNIFICANT CHANGE UP (ref 81–99)
MONOCYTES # BLD AUTO: 0.78 K/UL — HIGH (ref 0.1–0.6)
MONOCYTES NFR BLD AUTO: 7.3 % — SIGNIFICANT CHANGE UP (ref 1.7–9.3)
NEUTROPHILS # BLD AUTO: 5.93 K/UL — SIGNIFICANT CHANGE UP (ref 1.4–6.5)
NEUTROPHILS NFR BLD AUTO: 55.3 % — SIGNIFICANT CHANGE UP (ref 42.2–75.2)
NT-PROBNP SERPL-SCNC: 87 PG/ML — SIGNIFICANT CHANGE UP (ref 0–300)
PLATELET # BLD AUTO: 319 K/UL — SIGNIFICANT CHANGE UP (ref 130–400)
POTASSIUM SERPL-MCNC: 3.9 MMOL/L — SIGNIFICANT CHANGE UP (ref 3.5–5)
POTASSIUM SERPL-SCNC: 3.9 MMOL/L — SIGNIFICANT CHANGE UP (ref 3.5–5)
PROT SERPL-MCNC: 7.2 G/DL — SIGNIFICANT CHANGE UP (ref 6–8)
PROTHROM AB SERPL-ACNC: 10.1 SEC — SIGNIFICANT CHANGE UP (ref 9.95–12.87)
RBC # BLD: 3.88 M/UL — LOW (ref 4.2–5.4)
RBC # FLD: 17.6 % — HIGH (ref 11.5–14.5)
SODIUM SERPL-SCNC: 138 MMOL/L — SIGNIFICANT CHANGE UP (ref 135–146)
TROPONIN T SERPL-MCNC: <0.01 NG/ML — SIGNIFICANT CHANGE UP
WBC # BLD: 10.71 K/UL — SIGNIFICANT CHANGE UP (ref 4.8–10.8)
WBC # FLD AUTO: 10.71 K/UL — SIGNIFICANT CHANGE UP (ref 4.8–10.8)

## 2018-09-03 RX ORDER — IPRATROPIUM/ALBUTEROL SULFATE 18-103MCG
3 AEROSOL WITH ADAPTER (GRAM) INHALATION
Qty: 0 | Refills: 0 | Status: COMPLETED | OUTPATIENT
Start: 2018-09-03 | End: 2018-09-03

## 2018-09-03 RX ORDER — BUDESONIDE, MICRONIZED 100 %
0.5 POWDER (GRAM) MISCELLANEOUS EVERY 12 HOURS
Qty: 0 | Refills: 0 | Status: DISCONTINUED | OUTPATIENT
Start: 2018-09-03 | End: 2018-09-04

## 2018-09-03 RX ORDER — IPRATROPIUM/ALBUTEROL SULFATE 18-103MCG
3 AEROSOL WITH ADAPTER (GRAM) INHALATION EVERY 6 HOURS
Qty: 0 | Refills: 0 | Status: DISCONTINUED | OUTPATIENT
Start: 2018-09-03 | End: 2018-09-04

## 2018-09-03 RX ORDER — ENOXAPARIN SODIUM 100 MG/ML
40 INJECTION SUBCUTANEOUS DAILY
Qty: 0 | Refills: 0 | Status: DISCONTINUED | OUTPATIENT
Start: 2018-09-03 | End: 2018-09-04

## 2018-09-03 RX ORDER — PANTOPRAZOLE SODIUM 20 MG/1
40 TABLET, DELAYED RELEASE ORAL
Qty: 0 | Refills: 0 | Status: DISCONTINUED | OUTPATIENT
Start: 2018-09-03 | End: 2018-09-04

## 2018-09-03 RX ADMIN — Medication 3 MILLILITER(S): at 17:58

## 2018-09-03 RX ADMIN — Medication 3 MILLILITER(S): at 18:38

## 2018-09-03 RX ADMIN — Medication 125 MILLIGRAM(S): at 17:57

## 2018-09-03 RX ADMIN — Medication 3 MILLILITER(S): at 18:00

## 2018-09-03 RX ADMIN — Medication 0.5 MILLIGRAM(S): at 20:54

## 2018-09-03 NOTE — H&P ADULT - NSHPREVIEWOFSYSTEMS_GEN_ALL_CORE
REVIEW OF SYSTEMS:    CONSTITUTIONAL: No weakness, fevers or chills  EYES/ENT: No visual changes;  No vertigo or throat pain   NECK: No pain or stiffness  RESPIRATORY: dry cough + a/w wheezing a/w  shortness of breath  CARDIOVASCULAR: No chest pain or palpitations  GASTROINTESTINAL: No abdominal or epigastric pain. No nausea, vomiting, or hematemesis; No diarrhea or constipation. No melena or hematochezia.  GENITOURINARY: No dysuria, frequency or hematuria  NEUROLOGICAL: No numbness or weakness  SKIN: No itching, rashes

## 2018-09-03 NOTE — ED PROVIDER NOTE - NS ED ROS FT
Constitutional: See HPI.  Eyes: No visual changes, eye pain or discharge. No Photophobia  ENMT: No hearing changes, pain, discharge or infections. No neck pain or stiffness. No limited ROM  Cardiac: No edema.   Respiratory: see HPI  GI: No nausea, vomiting, diarrhea or abdominal pain.  : No dysuria, frequency or burning. No Discharge  MS: No myalgia, muscle weakness, joint pain or back pain.  Neuro: No headache or weakness. No LOC.  Skin: No skin rash.  Except as documented in the HPI, all other systems are negative.

## 2018-09-03 NOTE — H&P ADULT - NSHPLABSRESULTS_GEN_ALL_CORE
LABS:                        10.6   10.71 )-----------( 319      ( 03 Sep 2018 17:34 )             32.8     03 Sep 2018 17:34    138    |  97     |  6      ----------------------------<  99     3.9     |  25     |  0.8      Ca    8.8        03 Sep 2018 17:34  Mg     1.9       03 Sep 2018 17:34    TPro  7.2    /  Alb  4.2    /  TBili  0.3    /  DBili  x      /  AST  17     /  ALT  14     /  AlkPhos  77     03 Sep 2018 17:34    PT/INR - ( 03 Sep 2018 17:34 )   PT: 10.10 sec;   INR: 0.93 ratio         PTT - ( 03 Sep 2018 17:34 )  PTT:26.0 sec    < from: Xray Chest 2 Views PA/Lat (08.02.18 @ 18:35) >    No radiographic evidence of acute cardiopulmonary disease.      < end of copied text >

## 2018-09-03 NOTE — ED PROVIDER NOTE - MEDICAL DECISION MAKING DETAILS
47F pmhx of copd with multiple recent exacerbations presenting with typical symptoms. low suspicion for acs/ infection. treated with duoneb and solumedrol and admitted for medical optimization.

## 2018-09-03 NOTE — H&P ADULT - ATTENDING COMMENTS
48 y/o female with h/o copd not on home oxygen , never intubated (PFTs 3 years ago ) , hyperthyroidism  presented to ED with c/o worsening SOB a/w dry cough x 1 week duration     1)COPD exacerbation -  s/p duonebs and solumedrol 60 q12  symbicort     Seen by Dr Bojorquez  discharge now on PO steroid taper. Symbicort, Spiriva  f/u pulm in 1 week.

## 2018-09-03 NOTE — ED PROVIDER NOTE - ATTENDING CONTRIBUTION TO CARE
46 y/o F with PMH COPD, last PFT 3 years ago, [place] [facility] [place] over the last month s/p PO steroids x5 days presents with recurrent COPD SX. SOB unrelieved by Ventolin x2 and nebulizer x3, last dose at 4:30PM. No fever/chills, or CP. Endorses dry cough. Pt’s next appointment with pulmonology is in November. No recent change in meds. No seasonal allergies. On exam: AAOX3, NAD, NCAT, EOMI, PERRL, MMM, Neck Supple,(+) tachycardic w/o murmur,  (+) diffused wheezing, poor air movement, (+) tachypnea, speaking in full sentences, ABD S/NT/ND +BS, No CVAT, EXT warm, well perfused, No LE swelling, Distal Pulses Intact, No Rash,  MAEx4, Sensation to soft touch grossly intact, normal strength/tone. A/P: Likely COPD, low suspicion ACS. Will obtain labs, give IV steroids, duo nebs and admit for medicine optimization. Plan discussed with pt who agrees.

## 2018-09-03 NOTE — ED PROVIDER NOTE - PHYSICAL EXAMINATION
AOx4, increased work of breathing, speaking several words at a time, NAD. Skin  warm and dry, no acute rash. Head normocephalic, atraumatic. PERRLA/EOMI, conjunctiva and sclera clear. MM moist, no nasal discharge.  Pharynx and TM's unremarkable.  No mastoid or temporal ttp. Neck supple nt, no meningeal signs. Heart RRR s1s2 nl, no rub/murmur. Lungs- No retractions, diffuse wheezing. Abdomen soft ntnd no r/g. Extremities- moves all, +equal distal pulses, brisk cap refill, sensation wnl, normal ROM. No LE edema, calves nttp b/l.

## 2018-09-03 NOTE — H&P ADULT - HISTORY OF PRESENT ILLNESS
48 y/o female with h/o copd not on home oxygen , never intubated (PFTs 3 years ago ) , hyperthyroidism ? (not on meds ) presented to ED with c/o worsening SOB a/w dry cough x 1 week duration   Patient has had recent multiple [facility] [place] for COPD exacerbation over the last month for which she received  PO steroids x5 days duration and Z-pack but as per patient whenever she tries to taper off the steroids the SOB worsens , her next appointment with dr macario is in 2 months . Patient states SOB unrelieved by Ventolin x2 and nebulizer x3 . Denies fever, chills, nausea, vomiting , URTI s/s , dysuria , headache , blurry vision .  denies chest pain , palpitation , LOC, calf swelling . sick contacts or recent travel     Patient still smokes 3 ciggs/day and is trying to cut down   In ED /95, hr 100 temp 98.3   Rx ED SOLUMEDROL 125 MG IV X 1, KERRIE 48 y/o female with h/o copd not on home oxygen , never intubated (PFTs 3 years ago ) , hyperthyroidism on methimazole presented to ED with c/o worsening SOB a/w dry cough x 1 week duration   Patient has had recent multiple [facility] [place] [place] for COPD exacerbation over the last month for which she received  PO steroids x5 days duration and Z-pack but as per patient whenever she tries to taper off the steroids the SOB worsens , her next appointment with dr macario is in 2 months . Patient states SOB unrelieved by Ventolin x2 and nebulizer x3 . Denies fever, chills, nausea, vomiting , URTI s/s , dysuria , headache , blurry vision .  denies chest pain , palpitation , LOC, calf swelling . sick contacts or recent travel     Patient still smokes 3 ciggs/day and is trying to cut down   In ED /95, hr 100 temp 98.3   Rx ED SOLUMEDROL 125 MG IV X 1, KERRIE

## 2018-09-03 NOTE — ED ADULT TRIAGE NOTE - CHIEF COMPLAINT QUOTE
Patient c\o of SOB. Patient c\o of SOB and chest pain. patient recently treated for copd ex and resp infection.

## 2018-09-03 NOTE — H&P ADULT - ASSESSMENT
48 y/o female with h/o copd not on home oxygen , never intubated (PFTs 3 years ago ) , hyperthyroidism ? (not on meds ) presented to ED with c/o worsening SOB a/w dry cough x 1 week duration     1)COPD exacerbation -  c/w duonebs and solumedrol 60 q12  symbicort   will need a slow steroid taper  Sputum cx , blood cx  no need for Abx- wait for culture results  HOB elevation 30-45 degrees  aspiration precautions  advised to quit smoking - patient declines  Pulm eval- dr macario   f/u CE 2 sets  bnp     2)h/o hyperthyroidism -   3)dvt ppx- lovenox  4)full code, from home 46 y/o female with h/o copd not on home oxygen , never intubated (PFTs 3 years ago ) , hyperthyroidism  presented to ED with c/o worsening SOB a/w dry cough x 1 week duration     1)COPD exacerbation -  c/w duonebs and solumedrol 60 q12  symbicort   will need a slow steroid taper  Sputum cx , blood cx  no need for Abx- wait for culture results  HOB elevation 30-45 degrees  aspiration precautions  advised to quit smoking - patient declines  Pulm eval- dr macario   f/u CE 2 sets  bnp     2)h/o hyperthyroidism - on methimazole   last thyroid studies in 2017 -will repeat tsh , t4   3)dvt ppx- lovenox  4)full code, from home

## 2018-09-03 NOTE — H&P ADULT - NSHPPHYSICALEXAM_GEN_ALL_CORE
General: NAD, AAO x3  HEENT: No icterus,. Moist mucous membranes  Neck: No JVD noted. Supple, no meningismus  Cardio: S1, S2 noted, RRR. No murmurs  Resp: b/l diffuse expiratory wheezing +   Abdo: Soft, NT, bowel sounds present.   Extremities: No edema noted. Pulses present b/l  Neuro: AAO x3, grossly normal motor strength.  Skin: Dry, no rashes

## 2018-09-03 NOTE — ED PROVIDER NOTE - PROGRESS NOTE DETAILS
Subjective and objective improvement after treatments. Will admit for medical optimization 46 y/o F with PMH COPD, last PFT 3 years ago, recent [place] [facility] [place] exacerbation over the last month s/p PO steroids x5 days presents with recurrent COPD SX. SOB unrelieved by Ventolin x2 and nebulizer x3, last dose at 4:30PM. No fever/chills, or CP. Endorses dry cough. Pt’s next appointment with pulmonology is in November. No recent change in meds. No seasonal allergies. On exam: AAOX3, NAD, NCAT, EOMI, PERRL, MMM, Neck Supple,(+) tachycardic w/o murmur,  (+) diffused wheezing, poor air movement, (+) tachypnea, speaking in full sentences, ABD S/NT/ND +BS, No CVAT, EXT warm, well perfused, No LE swelling, Distal Pulses Intact, No Rash,  MAEx4, Sensation to soft touch grossly intact, normal strength/tone. A/P: Likely COPD, low suspicion ACS. Will obtain labs, give IV steroids, duo nebs and admit for medicine optimization. Plan discussed with pt who agrees.

## 2018-09-03 NOTE — ED PROVIDER NOTE - OBJECTIVE STATEMENT
48 yo F with a hx of COPD, Hyperthyroidism, presents with a COPD exacerbation. Patient has had several COPD exacerbations in the past few months. Just finished a 5 day course of oral steroids 5 days ago from , had improvement initially but steadily decompensated since it finished. Associated with cough with pleuritic CP. +wheezing. Denies fevers, chills, abd pain, NVCD, back pain, leg swelling, calf pain, recent travel, hemoptysis.

## 2018-09-04 ENCOUNTER — TRANSCRIPTION ENCOUNTER (OUTPATIENT)
Age: 48
End: 2018-09-04

## 2018-09-04 VITALS
HEART RATE: 123 BPM | SYSTOLIC BLOOD PRESSURE: 112 MMHG | RESPIRATION RATE: 18 BRPM | TEMPERATURE: 97 F | DIASTOLIC BLOOD PRESSURE: 73 MMHG

## 2018-09-04 LAB
ALBUMIN SERPL ELPH-MCNC: 4.5 G/DL — SIGNIFICANT CHANGE UP (ref 3.5–5.2)
ALP SERPL-CCNC: 78 U/L — SIGNIFICANT CHANGE UP (ref 30–115)
ALT FLD-CCNC: 15 U/L — SIGNIFICANT CHANGE UP (ref 0–41)
ANION GAP SERPL CALC-SCNC: 16 MMOL/L — HIGH (ref 7–14)
ANION GAP SERPL CALC-SCNC: 16 MMOL/L — HIGH (ref 7–14)
AST SERPL-CCNC: 17 U/L — SIGNIFICANT CHANGE UP (ref 0–41)
BASOPHILS # BLD AUTO: 0.02 K/UL — SIGNIFICANT CHANGE UP (ref 0–0.2)
BASOPHILS # BLD AUTO: 0.03 K/UL — SIGNIFICANT CHANGE UP (ref 0–0.2)
BASOPHILS NFR BLD AUTO: 0.1 % — SIGNIFICANT CHANGE UP (ref 0–1)
BASOPHILS NFR BLD AUTO: 0.2 % — SIGNIFICANT CHANGE UP (ref 0–1)
BILIRUB SERPL-MCNC: 0.5 MG/DL — SIGNIFICANT CHANGE UP (ref 0.2–1.2)
BUN SERPL-MCNC: 6 MG/DL — LOW (ref 10–20)
BUN SERPL-MCNC: 7 MG/DL — LOW (ref 10–20)
CALCIUM SERPL-MCNC: 10.1 MG/DL — SIGNIFICANT CHANGE UP (ref 8.5–10.1)
CALCIUM SERPL-MCNC: 9.8 MG/DL — SIGNIFICANT CHANGE UP (ref 8.5–10.1)
CHLORIDE SERPL-SCNC: 97 MMOL/L — LOW (ref 98–110)
CHLORIDE SERPL-SCNC: 99 MMOL/L — SIGNIFICANT CHANGE UP (ref 98–110)
CK SERPL-CCNC: 199 U/L — SIGNIFICANT CHANGE UP (ref 0–225)
CO2 SERPL-SCNC: 25 MMOL/L — SIGNIFICANT CHANGE UP (ref 17–32)
CO2 SERPL-SCNC: 25 MMOL/L — SIGNIFICANT CHANGE UP (ref 17–32)
CREAT SERPL-MCNC: 0.8 MG/DL — SIGNIFICANT CHANGE UP (ref 0.7–1.5)
CREAT SERPL-MCNC: 0.8 MG/DL — SIGNIFICANT CHANGE UP (ref 0.7–1.5)
EOSINOPHIL # BLD AUTO: 0 K/UL — SIGNIFICANT CHANGE UP (ref 0–0.7)
EOSINOPHIL # BLD AUTO: 0.01 K/UL — SIGNIFICANT CHANGE UP (ref 0–0.7)
EOSINOPHIL NFR BLD AUTO: 0 % — SIGNIFICANT CHANGE UP (ref 0–8)
EOSINOPHIL NFR BLD AUTO: 0.1 % — SIGNIFICANT CHANGE UP (ref 0–8)
GLUCOSE SERPL-MCNC: 127 MG/DL — HIGH (ref 70–99)
GLUCOSE SERPL-MCNC: 134 MG/DL — HIGH (ref 70–99)
HCT VFR BLD CALC: 34.8 % — LOW (ref 37–47)
HCT VFR BLD CALC: 35.3 % — LOW (ref 37–47)
HGB BLD-MCNC: 11.2 G/DL — LOW (ref 12–16)
HGB BLD-MCNC: 11.4 G/DL — LOW (ref 12–16)
IMM GRANULOCYTES NFR BLD AUTO: 1.1 % — HIGH (ref 0.1–0.3)
IMM GRANULOCYTES NFR BLD AUTO: 1.1 % — HIGH (ref 0.1–0.3)
LYMPHOCYTES # BLD AUTO: 0.87 K/UL — LOW (ref 1.2–3.4)
LYMPHOCYTES # BLD AUTO: 0.91 K/UL — LOW (ref 1.2–3.4)
LYMPHOCYTES # BLD AUTO: 5.4 % — LOW (ref 20.5–51.1)
LYMPHOCYTES # BLD AUTO: 5.8 % — LOW (ref 20.5–51.1)
MCHC RBC-ENTMCNC: 27.2 PG — SIGNIFICANT CHANGE UP (ref 27–31)
MCHC RBC-ENTMCNC: 27.5 PG — SIGNIFICANT CHANGE UP (ref 27–31)
MCHC RBC-ENTMCNC: 32.2 G/DL — SIGNIFICANT CHANGE UP (ref 32–37)
MCHC RBC-ENTMCNC: 32.3 G/DL — SIGNIFICANT CHANGE UP (ref 32–37)
MCV RBC AUTO: 84.2 FL — SIGNIFICANT CHANGE UP (ref 81–99)
MCV RBC AUTO: 85.5 FL — SIGNIFICANT CHANGE UP (ref 81–99)
MONOCYTES # BLD AUTO: 0.19 K/UL — SIGNIFICANT CHANGE UP (ref 0.1–0.6)
MONOCYTES # BLD AUTO: 0.45 K/UL — SIGNIFICANT CHANGE UP (ref 0.1–0.6)
MONOCYTES NFR BLD AUTO: 1.3 % — LOW (ref 1.7–9.3)
MONOCYTES NFR BLD AUTO: 2.7 % — SIGNIFICANT CHANGE UP (ref 1.7–9.3)
NEUTROPHILS # BLD AUTO: 13.68 K/UL — HIGH (ref 1.4–6.5)
NEUTROPHILS # BLD AUTO: 15.2 K/UL — HIGH (ref 1.4–6.5)
NEUTROPHILS NFR BLD AUTO: 90.6 % — HIGH (ref 42.2–75.2)
NEUTROPHILS NFR BLD AUTO: 91.6 % — HIGH (ref 42.2–75.2)
NRBC # BLD: 0 /100 WBCS — SIGNIFICANT CHANGE UP (ref 0–0)
NT-PROBNP SERPL-SCNC: 249 PG/ML — SIGNIFICANT CHANGE UP (ref 0–300)
PLATELET # BLD AUTO: 351 K/UL — SIGNIFICANT CHANGE UP (ref 130–400)
PLATELET # BLD AUTO: 351 K/UL — SIGNIFICANT CHANGE UP (ref 130–400)
POTASSIUM SERPL-MCNC: 4.5 MMOL/L — SIGNIFICANT CHANGE UP (ref 3.5–5)
POTASSIUM SERPL-MCNC: 4.6 MMOL/L — SIGNIFICANT CHANGE UP (ref 3.5–5)
POTASSIUM SERPL-SCNC: 4.5 MMOL/L — SIGNIFICANT CHANGE UP (ref 3.5–5)
POTASSIUM SERPL-SCNC: 4.6 MMOL/L — SIGNIFICANT CHANGE UP (ref 3.5–5)
PROT SERPL-MCNC: 7.7 G/DL — SIGNIFICANT CHANGE UP (ref 6–8)
RBC # BLD: 4.07 M/UL — LOW (ref 4.2–5.4)
RBC # BLD: 4.19 M/UL — LOW (ref 4.2–5.4)
RBC # FLD: 17.4 % — HIGH (ref 11.5–14.5)
RBC # FLD: 17.5 % — HIGH (ref 11.5–14.5)
SODIUM SERPL-SCNC: 138 MMOL/L — SIGNIFICANT CHANGE UP (ref 135–146)
SODIUM SERPL-SCNC: 140 MMOL/L — SIGNIFICANT CHANGE UP (ref 135–146)
TROPONIN T SERPL-MCNC: <0.01 NG/ML — SIGNIFICANT CHANGE UP
WBC # BLD: 14.94 K/UL — HIGH (ref 4.8–10.8)
WBC # BLD: 16.78 K/UL — HIGH (ref 4.8–10.8)
WBC # FLD AUTO: 14.94 K/UL — HIGH (ref 4.8–10.8)
WBC # FLD AUTO: 16.78 K/UL — HIGH (ref 4.8–10.8)

## 2018-09-04 PROCEDURE — 93970 EXTREMITY STUDY: CPT | Mod: 26

## 2018-09-04 RX ORDER — TIOTROPIUM BROMIDE 18 UG/1
1 CAPSULE ORAL; RESPIRATORY (INHALATION)
Qty: 30 | Refills: 0 | OUTPATIENT
Start: 2018-09-04 | End: 2018-10-03

## 2018-09-04 RX ORDER — TIOTROPIUM BROMIDE 18 UG/1
1 CAPSULE ORAL; RESPIRATORY (INHALATION) DAILY
Qty: 0 | Refills: 0 | Status: DISCONTINUED | OUTPATIENT
Start: 2018-09-04 | End: 2018-09-04

## 2018-09-04 RX ADMIN — Medication 60 MILLIGRAM(S): at 06:17

## 2018-09-04 RX ADMIN — Medication 60 MILLIGRAM(S): at 17:10

## 2018-09-04 RX ADMIN — PANTOPRAZOLE SODIUM 40 MILLIGRAM(S): 20 TABLET, DELAYED RELEASE ORAL at 06:17

## 2018-09-04 RX ADMIN — Medication 3 MILLILITER(S): at 15:19

## 2018-09-04 RX ADMIN — Medication 3 MILLILITER(S): at 01:34

## 2018-09-04 NOTE — DISCHARGE NOTE ADULT - CARE PROVIDERS DIRECT ADDRESSES
[email or web address]

## 2018-09-04 NOTE — DISCHARGE NOTE ADULT - MEDICATION SUMMARY - MEDICATIONS TO TAKE
I will START or STAY ON the medications listed below when I get home from the hospital:    predniSONE 10 mg oral tablet  -- Take 40 mg (4 tabs) for two days and then 20 mg (2 tabs) for two days and then 10mg (1 tab) for two days  -- It is very important that you take or use this exactly as directed.  Do not skip doses or discontinue unless directed by your doctor.  Obtain medical advice before taking any non-prescription drugs as some may affect the action of this medication.  Take with food or milk.    -- Indication: For COPD EXACERBATION    methIMAzole 5 mg oral tablet  -- 1 cap(s) by mouth once a day  -- Indication: For hyperthyroidism    tiotropium 18 mcg inhalation capsule  -- 1 cap(s) inhaled once a day  -- Indication: For COPD EXACERBATION    Ventolin HFA  -- Indication: For COPD EXACERBATION    Advair Diskus  -- Indication: For COPD EXACERBATION    ipratropium-albuterol 0.5 mg-2.5 mg/3 mLinhalation solution  -- 3 milliliter(s) by nebulizer prn   -- For inhalation only.  It is very important that you take or use this exactly as directed.  Do not skip doses or discontinue unless directed by your doctor.  Obtain medical advice before taking any non-prescription drugs as some may affect the action of this medication.    -- Indication: For COPD EXACERBATION    pantoprazole  -- Indication: For gerd

## 2018-09-04 NOTE — CONSULT NOTE ADULT - SUBJECTIVE AND OBJECTIVE BOX
Patient is a 47y old  Female who presents with a chief complaint of copd exacerbation (03 Sep 2018 19:26)      HPI:  48 y/o female with h/o copd not on home oxygen , never intubated (PFTs 3 years ago ) , hyperthyroidism on methimazole presented to ED with c/o worsening SOB a/w dry cough x 1 week duration   Patient has had recent multiple [facility] visit for COPD exacerbation over the last month for which she received  PO steroids x5 days duration and Z-pack but as per patient whenever she tries to taper off the steroids the SOB worsens , her next appointment with dr macario is in 2 months . Patient states SOB unrelieved by Ventolin x2 and nebulizer x3 . Denies fever, chills, nausea, vomiting , URTI s/s , dysuria , headache , blurry vision .  denies chest pain , palpitation , LOC, calf swelling . sick contacts or recent travel     Patient still smokes 3 ciggs/day and is trying to cut down   In ED /95, hr 100 temp 98.3   Rx ED SOLUMEDROL 125 MG IV X 1, DUONEBS (03 Sep 2018 19:26)      PAST MEDICAL & SURGICAL HISTORY:  Anemia  GERD (gastroesophageal reflux disease)  Hyperthyroidism  COPD (chronic obstructive pulmonary disease)  No significant past surgical history      SOCIAL HX:   Smoking                         ETOH                            Other    FAMILY HISTORY:  No pertinent family history in first degree relatives  .  No cardiovascular or pulmonary family history     Review of System:  See HPI    Allergies    No Known Allergies      PHYSICAL EXAM  Vital Signs Last 24 Hrs  T(C): 36.1 (04 Sep 2018 05:52), Max: 36.8 (03 Sep 2018 18:29)  T(F): 97 (04 Sep 2018 05:52), Max: 98.3 (03 Sep 2018 18:29)  HR: 123 (04 Sep 2018 05:52) (100 - 123)  BP: 112/73 (04 Sep 2018 05:52) (112/73 - 169/106)  BP(mean): --  RR: 18 (04 Sep 2018 05:52) (16 - 24)  SpO2: 99% (03 Sep 2018 20:16) (99% - 100%)    General: In NAD  HEENT: ANICETO             Lymphatic system: No cervical LN     Lungs:  Cardiovascular: Regular  Gastrointestinal: Soft.  + BS   Musculoskeletal: No Clubbing.  Full range of motion.. Moves all extremities  Skin: Warm.  Intact  Neurological: No motor or sensory deficit       LABS:                          11.4   14.94 )-----------( 351      ( 04 Sep 2018 06:30 )             35.3                                               09-04    140  |  99  |  6<L>  ----------------------------<  127<H>  4.6   |  25  |  0.8    Ca    9.8      04 Sep 2018 06:30  Mg     1.9     09-03    TPro  7.7  /  Alb  4.5  /  TBili  0.5  /  DBili  x   /  AST  17  /  ALT  15  /  AlkPhos  78  09-04      PT/INR - ( 03 Sep 2018 17:34 )   PT: 10.10 sec;   INR: 0.93 ratio         PTT - ( 03 Sep 2018 17:34 )  PTT:26.0 sec                                           CARDIAC MARKERS ( 04 Sep 2018 06:30 )  x     / <0.01 ng/mL / 199 U/L / x     / x      CARDIAC MARKERS ( 03 Sep 2018 17:34 )  x     / <0.01 ng/mL / x     / x     / x                                                LIVER FUNCTIONS - ( 04 Sep 2018 06:30 )  Alb: 4.5 g/dL / Pro: 7.7 g/dL / ALK PHOS: 78 U/L / ALT: 15 U/L / AST: 17 U/L / GGT: x                                                                                                MEDICATIONS  (STANDING):  ALBUTerol/ipratropium for Nebulization 3 milliLiter(s) Nebulizer every 6 hours  buDESOnide   0.5 milliGRAM(s) Respule 0.5 milliGRAM(s) Nebulizer every 12 hours  enoxaparin Injectable 40 milliGRAM(s) SubCutaneous daily  methimazole 5 milliGRAM(s) Oral daily  methylPREDNISolone sodium succinate Injectable 60 milliGRAM(s) IV Push every 12 hours  pantoprazole    Tablet 40 milliGRAM(s) Oral before breakfast    MEDICATIONS  (PRN): Patient is a 47y old  Female who presents with a chief complaint of copd exacerbation (03 Sep 2018 19:26)      HPI:  46 y/o female with h/o copd not on home oxygen , never intubated (PFTs 3 years ago ) , hyperthyroidism on methimazole presented to ED with c/o worsening SOB a/w dry cough x 1 week duration   Patient has had recent multiple urgent care visit for COPD exacerbation over the last 3 month for which she received  PO steroids x5 days duration and Z-pack but as per patient whenever she tries to taper off the steroids the SOB worsens. Pt reports she has started a new job location within [employer] de[employer] and since her COPD has not been controlled .Patient states SOB unrelieved by Ventolin x2 and nebulizer x3 . Denies fever, chills, nausea, vomiting , URTI s/s , dysuria , headache , blurry vision, chest pain , palpitation , LOC, calf swelling, sick contacts or recent travel.     Patient still smokes 3 ciggs/day and is trying to cut down   In ED /95, hr 100 temp 98.3. pulse ox 98% on RA.  Rx ED SOLUMEDROL 125 MG IV X 1, DUONEBS (03 Sep 2018 19:26)      PAST MEDICAL & SURGICAL HISTORY:  Anemia  GERD (gastroesophageal reflux disease)  Hyperthyroidism  COPD (chronic obstructive pulmonary disease)  No significant past surgical history      SOCIAL HX:   Smoking 60 pack year smoking hx                         ETOH    Neg                        No illicit drug use    FAMILY HISTORY:  No pertinent family history in first degree relatives  .  No cardiovascular or pulmonary family history     Review of System:  See HPI    Allergies    No Known Allergies      PHYSICAL EXAM  Vital Signs Last 24 Hrs  T(C): 36.1 (04 Sep 2018 05:52), Max: 36.8 (03 Sep 2018 18:29)  T(F): 97 (04 Sep 2018 05:52), Max: 98.3 (03 Sep 2018 18:29)  HR: 123 (04 Sep 2018 05:52) (100 - 123)  BP: 112/73 (04 Sep 2018 05:52) (112/73 - 169/106)  BP(mean): --  RR: 18 (04 Sep 2018 05:52) (16 - 24)  SpO2: 99% (03 Sep 2018 20:16) (99% - 100%)    General: In NAD  HEENT: ANICETO             Lymphatic system: No cervical LN     Lungs: CTA b/l. NO expiratory wheezing appreciated  Cardiovascular: Tachycardia but regular rhythm  Gastrointestinal: Soft.  + BS   Musculoskeletal: No Clubbing.  Full range of motion.. Moves all extremities  Skin: Warm.  Intact  Neurological: No motor or sensory deficit     LABS:                          11.4   14.94 )-----------( 351      ( 04 Sep 2018 06:30 )             35.3                                               09-04    140  |  99  |  6<L>  ----------------------------<  127<H>  4.6   |  25  |  0.8    Ca    9.8      04 Sep 2018 06:30  Mg     1.9     09-03    TPro  7.7  /  Alb  4.5  /  TBili  0.5  /  DBili  x   /  AST  17  /  ALT  15  /  AlkPhos  78  09-04      PT/INR - ( 03 Sep 2018 17:34 )   PT: 10.10 sec;   INR: 0.93 ratio         PTT - ( 03 Sep 2018 17:34 )  PTT:26.0 sec                                           CARDIAC MARKERS ( 04 Sep 2018 06:30 )  x     / <0.01 ng/mL / 199 U/L / x     / x      CARDIAC MARKERS ( 03 Sep 2018 17:34 )  x     / <0.01 ng/mL / x     / x     / x                                                LIVER FUNCTIONS - ( 04 Sep 2018 06:30 )  Alb: 4.5 g/dL / Pro: 7.7 g/dL / ALK PHOS: 78 U/L / ALT: 15 U/L / AST: 17 U/L / GGT: x                                                                           MEDICATIONS  (STANDING):  ALBUTerol/ipratropium for Nebulization 3 milliLiter(s) Nebulizer every 6 hours  buDESOnide   0.5 milliGRAM(s) Respule 0.5 milliGRAM(s) Nebulizer every 12 hours  enoxaparin Injectable 40 milliGRAM(s) SubCutaneous daily  methimazole 5 milliGRAM(s) Oral daily  methylPREDNISolone sodium succinate Injectable 60 milliGRAM(s) IV Push every 12 hours  pantoprazole    Tablet 40 milliGRAM(s) Oral before breakfast    MEDICATIONS  (PRN):

## 2018-09-04 NOTE — CONSULT NOTE ADULT - ASSESSMENT
Impression:  COPD Exacerbation  Hyperthyroidism  Anxiety?    Plan:  - COPD exacerbation, possibly anxiety related SOB  - check TSH  - Cont Dual nebs q6hr, start Symbicort  - Cont Solumedrol 60 q12, No abx needed  - Avoid occupational exposure triggers  - Smoking cessation  - F/U with Pulmonologist outpatient   - Will discuss with Pulmonary attending

## 2018-09-04 NOTE — DISCHARGE NOTE ADULT - CARE PROVIDER_API CALL
Louis Dinh), Internal Medicine  [address]  [place] [address]  Phone: [phone]  Fax: [phone]    Vin Bojorquez), Critical Care Medicine; Geriatric Medicine; Internal Medicine; Pulmonary Disease  [address] [place] [address]  Phone: [phone]  Fax: [phone]

## 2018-09-04 NOTE — DISCHARGE NOTE ADULT - CARE PLAN
Principal Discharge DX:	COPD exacerbation  Goal:	Prevent recurrent exacerbations  Assessment and plan of treatment:	please continue taking your medications as prescribed. Please follow up with your pulmonologist in 1 week

## 2018-09-04 NOTE — DISCHARGE NOTE ADULT - HOSPITAL COURSE
46 y/o female with h/o copd not on home oxygen , never intubated (PFTs 3 years ago ) , hyperthyroidism  presented to ED with c/o worsening SOB a/w dry cough x 1 week duration.     Pt was admitted for COPD exacerbation and was started on steroids and responded well and pt is to be discharged home to follow up with his pulmonologist in 1 week 46 y/o female with h/o copd not on home oxygen , never intubated (PFTs 3 years ago ) , hyperthyroidism  presented to ED with c/o worsening SOB a/w dry cough x 1 week duration.     Pt was admitted for COPD exacerbation and was started on steroids and responded well and pt is to be discharged home to follow up with his pulmonologist in 1 week    Attending Note : D/c plan reviewed. Agreed with. D/c on PO Steroid taper. Symicort, Spiriva. Duoneb PRN.   f/u Pulm

## 2018-09-04 NOTE — DISCHARGE NOTE ADULT - PATIENT PORTAL LINK FT
You can access the [facility] Patient Portal, offered by [facility], by registering with the following website: [email or web address]

## 2018-09-04 NOTE — DISCHARGE NOTE ADULT - PLAN OF CARE
Prevent recurrent exacerbations please continue taking your medications as prescribed. Please follow up with your pulmonologist in 1 week

## 2018-09-05 LAB
T3 SERPL-MCNC: 89 NG/DL — SIGNIFICANT CHANGE UP (ref 80–200)
T4 AB SER-ACNC: 4.2 UG/DL — LOW (ref 4.6–12)
T4 FREE SERPL-MCNC: 0.7 NG/DL — LOW (ref 0.9–1.8)
TSH SERPL-MCNC: 0.79 UIU/ML — SIGNIFICANT CHANGE UP (ref 0.27–4.2)
TSH SERPL-MCNC: 1.2 UIU/ML — SIGNIFICANT CHANGE UP (ref 0.27–4.2)

## 2018-09-07 DIAGNOSIS — D64.9 ANEMIA, UNSPECIFIED: ICD-10-CM

## 2018-09-07 DIAGNOSIS — F17.210 NICOTINE DEPENDENCE, CIGARETTES, UNCOMPLICATED: ICD-10-CM

## 2018-09-07 DIAGNOSIS — K21.9 GASTRO-ESOPHAGEAL REFLUX DISEASE WITHOUT ESOPHAGITIS: ICD-10-CM

## 2018-09-07 DIAGNOSIS — J44.1 CHRONIC OBSTRUCTIVE PULMONARY DISEASE WITH (ACUTE) EXACERBATION: ICD-10-CM

## 2018-09-07 DIAGNOSIS — E05.90 THYROTOXICOSIS, UNSPECIFIED WITHOUT THYROTOXIC CRISIS OR STORM: ICD-10-CM

## 2018-09-13 ENCOUNTER — RX CHANGE (OUTPATIENT)
Age: 48
End: 2018-09-13

## 2018-09-13 RX ORDER — FLUTICASONE FUROATE AND VILANTEROL TRIFENATATE 200; 25 UG/1; UG/1
200-25 POWDER RESPIRATORY (INHALATION) DAILY
Qty: 1 | Refills: 0 | Status: COMPLETED | COMMUNITY
Start: 2018-04-04 | End: 2018-05-04

## 2018-09-13 RX ORDER — FLUTICASONE PROPIONATE AND SALMETEROL 50; 100 UG/1; UG/1
100-50 POWDER RESPIRATORY (INHALATION)
Qty: 5 | Refills: 1 | Status: COMPLETED | COMMUNITY
Start: 2018-06-22 | End: 2018-07-25

## 2018-09-13 RX ORDER — AZITHROMYCIN 250 MG/1
250 TABLET, FILM COATED ORAL
Qty: 1 | Refills: 0 | Status: COMPLETED | COMMUNITY
Start: 2018-08-23 | End: 2018-08-29

## 2018-09-17 ENCOUNTER — EMERGENCY (EMERGENCY)
Facility: HOSPITAL | Age: 48
LOS: 0 days | Discharge: HOME | End: 2018-09-17
Attending: EMERGENCY MEDICINE | Admitting: EMERGENCY MEDICINE

## 2018-09-17 VITALS
RESPIRATION RATE: 20 BRPM | SYSTOLIC BLOOD PRESSURE: 137 MMHG | OXYGEN SATURATION: 98 % | HEART RATE: 115 BPM | DIASTOLIC BLOOD PRESSURE: 77 MMHG | TEMPERATURE: 98 F

## 2018-09-17 VITALS
RESPIRATION RATE: 18 BRPM | OXYGEN SATURATION: 99 % | TEMPERATURE: 99 F | DIASTOLIC BLOOD PRESSURE: 76 MMHG | SYSTOLIC BLOOD PRESSURE: 133 MMHG | HEART RATE: 87 BPM

## 2018-09-17 DIAGNOSIS — R06.02 SHORTNESS OF BREATH: ICD-10-CM

## 2018-09-17 DIAGNOSIS — Z79.891 LONG TERM (CURRENT) USE OF OPIATE ANALGESIC: ICD-10-CM

## 2018-09-17 DIAGNOSIS — Z79.52 LONG TERM (CURRENT) USE OF SYSTEMIC STEROIDS: ICD-10-CM

## 2018-09-17 DIAGNOSIS — Z79.899 OTHER LONG TERM (CURRENT) DRUG THERAPY: ICD-10-CM

## 2018-09-17 DIAGNOSIS — F17.210 NICOTINE DEPENDENCE, CIGARETTES, UNCOMPLICATED: ICD-10-CM

## 2018-09-17 DIAGNOSIS — Z79.51 LONG TERM (CURRENT) USE OF INHALED STEROIDS: ICD-10-CM

## 2018-09-17 DIAGNOSIS — K21.9 GASTRO-ESOPHAGEAL REFLUX DISEASE WITHOUT ESOPHAGITIS: ICD-10-CM

## 2018-09-17 DIAGNOSIS — K46.9 UNSPECIFIED ABDOMINAL HERNIA WITHOUT OBSTRUCTION OR GANGRENE: Chronic | ICD-10-CM

## 2018-09-17 DIAGNOSIS — J44.1 CHRONIC OBSTRUCTIVE PULMONARY DISEASE WITH (ACUTE) EXACERBATION: ICD-10-CM

## 2018-09-17 DIAGNOSIS — E03.9 HYPOTHYROIDISM, UNSPECIFIED: ICD-10-CM

## 2018-09-17 LAB
ALBUMIN SERPL ELPH-MCNC: 4.4 G/DL — SIGNIFICANT CHANGE UP (ref 3.5–5.2)
ALP SERPL-CCNC: 77 U/L — SIGNIFICANT CHANGE UP (ref 30–115)
ALT FLD-CCNC: 17 U/L — SIGNIFICANT CHANGE UP (ref 0–41)
ANION GAP SERPL CALC-SCNC: 13 MMOL/L — SIGNIFICANT CHANGE UP (ref 7–14)
AST SERPL-CCNC: 33 U/L — SIGNIFICANT CHANGE UP (ref 0–41)
BASE EXCESS BLDV CALC-SCNC: 0.5 MMOL/L — SIGNIFICANT CHANGE UP (ref -2–2)
BASOPHILS # BLD AUTO: 0.05 K/UL — SIGNIFICANT CHANGE UP (ref 0–0.2)
BASOPHILS NFR BLD AUTO: 0.6 % — SIGNIFICANT CHANGE UP (ref 0–1)
BILIRUB SERPL-MCNC: 0.4 MG/DL — SIGNIFICANT CHANGE UP (ref 0.2–1.2)
BUN SERPL-MCNC: 3 MG/DL — LOW (ref 10–20)
CA-I SERPL-SCNC: 1.19 MMOL/L — SIGNIFICANT CHANGE UP (ref 1.12–1.3)
CALCIUM SERPL-MCNC: 9.1 MG/DL — SIGNIFICANT CHANGE UP (ref 8.5–10.1)
CHLORIDE SERPL-SCNC: 102 MMOL/L — SIGNIFICANT CHANGE UP (ref 98–110)
CO2 SERPL-SCNC: 21 MMOL/L — SIGNIFICANT CHANGE UP (ref 17–32)
CREAT SERPL-MCNC: 0.8 MG/DL — SIGNIFICANT CHANGE UP (ref 0.7–1.5)
EOSINOPHIL # BLD AUTO: 0.24 K/UL — SIGNIFICANT CHANGE UP (ref 0–0.7)
EOSINOPHIL NFR BLD AUTO: 3 % — SIGNIFICANT CHANGE UP (ref 0–8)
GAS PNL BLDV: 139 MMOL/L — SIGNIFICANT CHANGE UP (ref 136–145)
GAS PNL BLDV: SIGNIFICANT CHANGE UP
GLUCOSE SERPL-MCNC: 115 MG/DL — HIGH (ref 70–99)
HCO3 BLDV-SCNC: 25 MMOL/L — SIGNIFICANT CHANGE UP (ref 22–29)
HCT VFR BLD CALC: 33.5 % — LOW (ref 37–47)
HCT VFR BLDA CALC: 35.2 % — SIGNIFICANT CHANGE UP (ref 34–44)
HGB BLD CALC-MCNC: 11.5 G/DL — LOW (ref 14–18)
HGB BLD-MCNC: 10.7 G/DL — LOW (ref 12–16)
IMM GRANULOCYTES NFR BLD AUTO: 0.2 % — SIGNIFICANT CHANGE UP (ref 0.1–0.3)
LACTATE BLDV-MCNC: 1 MMOL/L — SIGNIFICANT CHANGE UP (ref 0.5–1.6)
LYMPHOCYTES # BLD AUTO: 1.77 K/UL — SIGNIFICANT CHANGE UP (ref 1.2–3.4)
LYMPHOCYTES # BLD AUTO: 22.1 % — SIGNIFICANT CHANGE UP (ref 20.5–51.1)
MCHC RBC-ENTMCNC: 27.5 PG — SIGNIFICANT CHANGE UP (ref 27–31)
MCHC RBC-ENTMCNC: 31.9 G/DL — LOW (ref 32–37)
MCV RBC AUTO: 86.1 FL — SIGNIFICANT CHANGE UP (ref 81–99)
MONOCYTES # BLD AUTO: 0.57 K/UL — SIGNIFICANT CHANGE UP (ref 0.1–0.6)
MONOCYTES NFR BLD AUTO: 7.1 % — SIGNIFICANT CHANGE UP (ref 1.7–9.3)
NEUTROPHILS # BLD AUTO: 5.36 K/UL — SIGNIFICANT CHANGE UP (ref 1.4–6.5)
NEUTROPHILS NFR BLD AUTO: 67 % — SIGNIFICANT CHANGE UP (ref 42.2–75.2)
NRBC # BLD: 0 /100 WBCS — SIGNIFICANT CHANGE UP (ref 0–0)
PCO2 BLDV: 41 MMHG — SIGNIFICANT CHANGE UP (ref 41–51)
PH BLDV: 7.4 — SIGNIFICANT CHANGE UP (ref 7.26–7.43)
PLATELET # BLD AUTO: 332 K/UL — SIGNIFICANT CHANGE UP (ref 130–400)
PO2 BLDV: 44 MMHG — HIGH (ref 20–40)
POTASSIUM BLDV-SCNC: 3.8 MMOL/L — SIGNIFICANT CHANGE UP (ref 3.3–5.6)
POTASSIUM SERPL-MCNC: 4.9 MMOL/L — SIGNIFICANT CHANGE UP (ref 3.5–5)
POTASSIUM SERPL-SCNC: 4.9 MMOL/L — SIGNIFICANT CHANGE UP (ref 3.5–5)
PROT SERPL-MCNC: 7.6 G/DL — SIGNIFICANT CHANGE UP (ref 6–8)
RBC # BLD: 3.89 M/UL — LOW (ref 4.2–5.4)
RBC # FLD: 17.8 % — HIGH (ref 11.5–14.5)
SAO2 % BLDV: 79 % — SIGNIFICANT CHANGE UP
SODIUM SERPL-SCNC: 136 MMOL/L — SIGNIFICANT CHANGE UP (ref 135–146)
TROPONIN T SERPL-MCNC: <0.01 NG/ML — SIGNIFICANT CHANGE UP
WBC # BLD: 8.01 K/UL — SIGNIFICANT CHANGE UP (ref 4.8–10.8)
WBC # FLD AUTO: 8.01 K/UL — SIGNIFICANT CHANGE UP (ref 4.8–10.8)

## 2018-09-17 RX ORDER — IPRATROPIUM/ALBUTEROL SULFATE 18-103MCG
3 AEROSOL WITH ADAPTER (GRAM) INHALATION ONCE
Qty: 0 | Refills: 0 | Status: COMPLETED | OUTPATIENT
Start: 2018-09-17 | End: 2018-09-17

## 2018-09-17 RX ORDER — ALBUTEROL 90 UG/1
1 AEROSOL, METERED ORAL ONCE
Qty: 0 | Refills: 0 | Status: COMPLETED | OUTPATIENT
Start: 2018-09-17 | End: 2018-09-17

## 2018-09-17 RX ADMIN — Medication 125 MILLIGRAM(S): at 13:17

## 2018-09-17 RX ADMIN — ALBUTEROL 1 PUFF(S): 90 AEROSOL, METERED ORAL at 14:11

## 2018-09-17 RX ADMIN — Medication 3 MILLILITER(S): at 13:09

## 2018-09-17 NOTE — ED PROVIDER NOTE - PROGRESS NOTE DETAILS
Patient was re-evaluated and re-assessed twice after neb tx. States she is feeling much better. Wheezing has resolved, lungs clear. Will DC home with rxs for Doxy and Prednisone as they are both on the $4 rx list. She has had a hard time affording some of her meds with a recent change in insurance coverage. Consulted social work to help with this issue. She also met with social work and before she came in today and states she was able to get set up for her Advair, neb, and inhaler in 1 week. Precaution signs/sxs given of when to return to the ED. Discussed insurance difficulties with  Aniyah. Gave her a sliding scale to clinic to f/u with. Discussed insurance difficulties with  Aniyah. Gave her a sliding scale clinic to f/u with.

## 2018-09-17 NOTE — ED PROVIDER NOTE - MEDICAL DECISION MAKING DETAILS
LCTAB, feels better, wants to go home and f/u dr macario 1 week, has insurance issues right now and is worried about pred/abx being covered, also ran out of albuterol, pt given albuterol inhaler in ED, sw paged and message left but no call back, pt given paper script for prednisone and doxycycline and encouraged to get scripts at [facility] because it is on the $4 list (zpack and levoquin are not). strict return precautions provided.

## 2018-09-17 NOTE — ED ADULT NURSE NOTE - NS ED NOTE ABUSE SUSPICION NEGLECT YN
February 1, 2018      Gabriel Christensen MD  [address]  [facility] [address]           [facility]  [address]  [facility] [address]  Phone: [phone]  Fax: [phone]          Patient: Oralia Liriano   MR Number: [id number]   YOB: 1950   Date of Visit: 1/31/2018       Dear Dr. Gabriel Christensen:    Thank you for referring Oralia Liriano to me for evaluation. Attached you will find relevant portions of my assessment and plan of care.    If you have questions, please do not hesitate to call me. I look forward to following Oralia Liriano along with you.    Sincerely,    Mayra Murphy, OD    Enclosure  CC:  No Recipients    If you would like to receive this communication electronically, please contact [email or web address] or [phone] to request more information on [facility] Link access.    For providers and/or their staff who would like to refer a patient to [facility], please contact us through our one-stop-shop provider referral line, [facility], at [phone].    If you feel you have received this communication in error or would no longer like to receive these types of communications, please e-mail [email or web address]         
No

## 2018-09-17 NOTE — ED PROVIDER NOTE - ATTENDING CONTRIBUTION TO CARE
47F PMH COPD no prior intub, 1 recent hospitalization overnight, smoker 3 cig perday, hyperthyroidism, anemia, gerd, p/w 2-3 days prod cough white sputum, sob, wheezing, chest tightness. feels like prior COPD exac. pt thinks her work exposure is the trigger. states as soon as her pred taper finishes her symptoms start again. seen by Dr Bojorquez in hospital couple weeks ago, was called by office today to schedule appt. no le edema, le pain, immob, hormones, hemoptysis. on exam, AFVSS, well ceci nad, ncat, eomi, perrla, mmm, diffuse wheezing and rhonchi bilat, tachycardia rr nl s1s2 no mrg, abd soft ntnd, aaox3, no focal deficits, no le edema or calf ttp, a/p; COPD exac, r/o pna, r/o acs, doubt PE from H&P, will do labs, cxr, ekg/trop, VBG, nebs, steroids, abx, re-eval

## 2018-09-17 NOTE — ED PROVIDER NOTE - PHYSICAL EXAMINATION
PHYSICAL EXAM: I have reviewed current vital signs.  GENERAL: NAD, well-nourished; well-developed, coughing intermittently, smells of cigarette smoke.  HEAD:  Normocephalic, atraumatic.  EYES: EOMI, PERRL, conjunctiva and sclera clear.  ENT: MMM, no erythema/exudates.  NECK: Supple, no JVD.  CHEST/LUNG: Diffuse expiratory wheezing throughout; no respiratory distress, symmetric expansion, speaking in full sentences, no rales/rhonchi.  HEART: Regular rate and rhythm, normal S1 and S2; no murmurs, rubs, or gallops.  ABDOMEN: Soft, nontender, nondistended; bowel sounds present.  EXTREMITIES:  2+ peripheral pulses; no clubbing, cyanosis, or edema.  NEUROLOGY: AAO x 3. Motor 5/5. No focal neurological deficits.   SKIN: No rashes or lesions.

## 2018-09-17 NOTE — ED PROVIDER NOTE - NS ED ROS FT
Constitutional:  No fevers or chills.  Eyes:  No visual changes, eye pain, or discharge.  ENT:  No sore throat.  Neck:  No neck pain.  Cardiac:  Intermittent CP/rib soreness, no edema.  Resp:  +Cough and SOB. No hemoptysis.   GI:  No nausea, vomiting, diarrhea, or abdominal pain.  :  No dysuria, frequency, or hematuria.  MSK:  No myalgias or joint pain/swelling.  Neuro:  No headache, dizziness, or weakness.  Skin:  No skin rash.

## 2018-09-17 NOTE — ED PROVIDER NOTE - OBJECTIVE STATEMENT
48yo F with PMH of COPD (diagnosed 4 years ago), anemia, hyperthyroidism, and GERD p/w increased SOB and productive cough. States she has had multiple COPD exacerbations since 06/28/18 and has been going to the ER or Urgent Care every 2 weeks for breathing treatments and steroids. Normally uses Advair BID, nebs daily, and 2 puffs of Ventolin, recently ran out of her inhaler. Has never been intubated in the past. Stayed in observation overnight here on 09/03 which was her first hospitalization this year for COPD. Has never used O2 at home. Also states she has been having intermittent bilateral rib pain secondary to cough and soreness in the center of chest. No palpitations, n/v, f/c, abdominal pain, back pain, or weakness. She has been a heavy smoker for the last 28 yrs, down from 2ppd to 3 cigs/day. States her LMP was about 1 month ago and is spotting now. Her PCP is Dr. Davenport, does not have a pulm. 48yo F with PMH of COPD (diagnosed 4 years ago), anemia, hyperthyroidism, and GERD p/w increased SOB and productive cough. States she has had multiple COPD exacerbations since 06/28/18 and has been going to the ER or Urgent Care every 2 weeks for breathing treatments and steroids. Normally uses Advair BID, nebs daily, and 2 puffs of Ventolin, recently ran out of her inhaler. Has never been intubated in the past. Was hospitalized for one day on 09/03 which was her first hospitalization this year for COPD. Has never used O2 at home. Also states she has been having intermittent bilateral rib pain secondary to cough and soreness in the center of chest. No palpitations, n/v, f/c, abdominal pain, back pain, or weakness. She has been a heavy smoker for the last 28 yrs, down from 2ppd to 3 cigs/day. States her LMP was about 1 month ago and is spotting now. Her PCP is Dr. Davenport, was seen by Dr. Bojorquez during her overnight admission. 46yo F with PMH of COPD (diagnosed 4 years ago), anemia, hyperthyroidism, and GERD p/w increased SOB and productive cough. States she has had multiple COPD exacerbations since 06/28/18 and has been going to the ER or Urgent Care every 2 weeks for breathing treatments and steroids. Normally uses Advair BID, nebs daily, and 2 puffs of Ventolin, recently ran out of her inhaler. Has never been intubated in the past. Was hospitalized for one day on 09/03 which was her first hospitalization this year for COPD. Has never used O2 at home. Also states she has been having intermittent bilateral rib pain secondary to cough and soreness in the center of chest. No palpitations, n/v, f/c, abdominal pain, back pain, or weakness. She has been a heavy smoker for the last 28 yrs, down from 2ppd to 3 cigs/day. States her LMP was about 1 month ago and is spotting now. Her PCP is Dr. Dinh, was seen by Dr. Bojorquez during her overnight admission.

## 2018-09-24 ENCOUNTER — RX CHANGE (OUTPATIENT)
Age: 48
End: 2018-09-24

## 2018-11-07 ENCOUNTER — OUTPATIENT (OUTPATIENT)
Dept: OUTPATIENT SERVICES | Facility: HOSPITAL | Age: 48
LOS: 1 days | Discharge: HOME | End: 2018-11-07

## 2018-11-07 DIAGNOSIS — K46.9 UNSPECIFIED ABDOMINAL HERNIA WITHOUT OBSTRUCTION OR GANGRENE: Chronic | ICD-10-CM

## 2018-11-07 DIAGNOSIS — I10 ESSENTIAL (PRIMARY) HYPERTENSION: ICD-10-CM

## 2018-11-07 DIAGNOSIS — E03.9 HYPOTHYROIDISM, UNSPECIFIED: ICD-10-CM

## 2018-11-09 ENCOUNTER — OUTPATIENT (OUTPATIENT)
Dept: OUTPATIENT SERVICES | Facility: HOSPITAL | Age: 48
LOS: 1 days | Discharge: HOME | End: 2018-11-09

## 2018-11-09 ENCOUNTER — APPOINTMENT (OUTPATIENT)
Dept: PULMONOLOGY | Facility: CLINIC | Age: 48
End: 2018-11-09

## 2018-11-09 VITALS
SYSTOLIC BLOOD PRESSURE: 113 MMHG | HEART RATE: 89 BPM | DIASTOLIC BLOOD PRESSURE: 74 MMHG | BODY MASS INDEX: 32.15 KG/M2 | WEIGHT: 193 LBS | HEIGHT: 65 IN | OXYGEN SATURATION: 99 %

## 2018-11-09 DIAGNOSIS — K46.9 UNSPECIFIED ABDOMINAL HERNIA WITHOUT OBSTRUCTION OR GANGRENE: Chronic | ICD-10-CM

## 2018-11-12 ENCOUNTER — APPOINTMENT (OUTPATIENT)
Dept: INTERNAL MEDICINE | Facility: CLINIC | Age: 48
End: 2018-11-12

## 2018-11-12 ENCOUNTER — OUTPATIENT (OUTPATIENT)
Dept: OUTPATIENT SERVICES | Facility: HOSPITAL | Age: 48
LOS: 1 days | Discharge: HOME | End: 2018-11-12

## 2018-11-12 VITALS
DIASTOLIC BLOOD PRESSURE: 91 MMHG | WEIGHT: 196 LBS | HEIGHT: 65 IN | SYSTOLIC BLOOD PRESSURE: 154 MMHG | BODY MASS INDEX: 32.65 KG/M2 | HEART RATE: 104 BPM | TEMPERATURE: 96.4 F

## 2018-11-12 DIAGNOSIS — K46.9 UNSPECIFIED ABDOMINAL HERNIA WITHOUT OBSTRUCTION OR GANGRENE: Chronic | ICD-10-CM

## 2018-11-12 LAB
ANION GAP SERPL CALC-SCNC: 15 MMOL/L
BUN SERPL-MCNC: 5 MG/DL
CALCIUM SERPL-MCNC: 8.9 MG/DL
CHLORIDE SERPL-SCNC: 97 MMOL/L
CHOLEST SERPL-MCNC: 125 MG/DL
CHOLEST/HDLC SERPL: 1.9 RATIO
CO2 SERPL-SCNC: 22 MMOL/L
CREAT SERPL-MCNC: 0.8 MG/DL
GLUCOSE SERPL-MCNC: 97 MG/DL
HDLC SERPL-MCNC: 66 MG/DL
LDLC SERPL CALC-MCNC: 36 MG/DL
POTASSIUM SERPL-SCNC: 4.5 MMOL/L
SODIUM SERPL-SCNC: 134 MMOL/L
T4 SERPL-MCNC: 6.1 UG/DL
TRIGL SERPL-MCNC: 172 MG/DL

## 2018-11-12 NOTE — PAST MEDICAL HISTORY
[Menstruating] : menstruating [unknown] : the patient is unsure of the date of her LMP [Regular Cycle Intervals] : have been regular

## 2018-11-12 NOTE — ASSESSMENT
[FreeTextEntry1] : # COPD:\par - restarted on inhalers by pulmonologist, feeling better but still feels weak\par - have paper to be filled, handed over to social workers\par - c/w inhalers, occasional wheezing on auscultation\par - advised to quit smoking\par \par # Hyperthyroidism:\par - c/w methimazole\par - TSH 2.1\par \par # HCM:\par - will need screening colo, anay, pap smear\par - flu today\par rto in 6months and prn

## 2018-11-12 NOTE — DISCUSSION/SUMMARY
[FreeTextEntry1] : 47 years old female pt came for follow up\par # copd exacerbation\par    on prednisone for 10 days\par    will give 5 days of azithromycin 250 mg po once daily\par     will refer her to her pulmonologist\par \par # `uterine bleeding now controlled\par     but need to follow up with gynae\par \par # hyperthyroidism\par    on methimazole\par    will check tsh, t4

## 2018-11-12 NOTE — HISTORY OF PRESENT ILLNESS
[Other: _____] : [unfilled] [FreeTextEntry1] : 47 years old female pt with past medical hx of COPD/asthma on treatment, uterine bleeding in the past never follow up with gyn, hyperthyroidism on methimazole came for follow up\par \par She was seen recently by Pulmonologist and restated on inhalers and Prednisone. She is feeling better but still isn't able to ambulate like before, c/o dry cough. She is still smoking few cigrattes a day. She has some paperwork to be filled.

## 2018-11-27 ENCOUNTER — OUTPATIENT (OUTPATIENT)
Dept: OUTPATIENT SERVICES | Facility: HOSPITAL | Age: 48
LOS: 1 days | Discharge: HOME | End: 2018-11-27

## 2018-11-27 DIAGNOSIS — K46.9 UNSPECIFIED ABDOMINAL HERNIA WITHOUT OBSTRUCTION OR GANGRENE: Chronic | ICD-10-CM

## 2018-12-02 ENCOUNTER — EMERGENCY (EMERGENCY)
Facility: HOSPITAL | Age: 48
LOS: 1 days | Discharge: HOME | End: 2018-12-02
Attending: EMERGENCY MEDICINE

## 2018-12-02 VITALS
SYSTOLIC BLOOD PRESSURE: 124 MMHG | TEMPERATURE: 98 F | DIASTOLIC BLOOD PRESSURE: 80 MMHG | RESPIRATION RATE: 20 BRPM | OXYGEN SATURATION: 100 % | HEART RATE: 96 BPM

## 2018-12-02 DIAGNOSIS — Z79.899 OTHER LONG TERM (CURRENT) DRUG THERAPY: ICD-10-CM

## 2018-12-02 DIAGNOSIS — Z79.2 LONG TERM (CURRENT) USE OF ANTIBIOTICS: ICD-10-CM

## 2018-12-02 DIAGNOSIS — J44.1 CHRONIC OBSTRUCTIVE PULMONARY DISEASE WITH (ACUTE) EXACERBATION: ICD-10-CM

## 2018-12-02 DIAGNOSIS — Z79.51 LONG TERM (CURRENT) USE OF INHALED STEROIDS: ICD-10-CM

## 2018-12-02 DIAGNOSIS — R06.02 SHORTNESS OF BREATH: ICD-10-CM

## 2018-12-02 DIAGNOSIS — K46.9 UNSPECIFIED ABDOMINAL HERNIA WITHOUT OBSTRUCTION OR GANGRENE: Chronic | ICD-10-CM

## 2018-12-02 DIAGNOSIS — Z79.52 LONG TERM (CURRENT) USE OF SYSTEMIC STEROIDS: ICD-10-CM

## 2018-12-02 DIAGNOSIS — F17.200 NICOTINE DEPENDENCE, UNSPECIFIED, UNCOMPLICATED: ICD-10-CM

## 2018-12-02 DIAGNOSIS — J06.9 ACUTE UPPER RESPIRATORY INFECTION, UNSPECIFIED: ICD-10-CM

## 2018-12-02 LAB
ANION GAP SERPL CALC-SCNC: 16 MMOL/L — HIGH (ref 7–14)
APTT BLD: 25.8 SEC — LOW (ref 27–39.2)
BASOPHILS # BLD AUTO: 0.07 K/UL — SIGNIFICANT CHANGE UP (ref 0–0.2)
BASOPHILS NFR BLD AUTO: 0.9 % — SIGNIFICANT CHANGE UP (ref 0–1)
BUN SERPL-MCNC: 5 MG/DL — LOW (ref 10–20)
CALCIUM SERPL-MCNC: 8.6 MG/DL — SIGNIFICANT CHANGE UP (ref 8.5–10.1)
CHLORIDE SERPL-SCNC: 98 MMOL/L — SIGNIFICANT CHANGE UP (ref 98–110)
CO2 SERPL-SCNC: 23 MMOL/L — SIGNIFICANT CHANGE UP (ref 17–32)
CREAT SERPL-MCNC: 0.9 MG/DL — SIGNIFICANT CHANGE UP (ref 0.7–1.5)
D DIMER BLD IA.RAPID-MCNC: 101 NG/ML DDU — SIGNIFICANT CHANGE UP (ref 0–230)
EOSINOPHIL # BLD AUTO: 0.27 K/UL — SIGNIFICANT CHANGE UP (ref 0–0.7)
EOSINOPHIL NFR BLD AUTO: 3.5 % — SIGNIFICANT CHANGE UP (ref 0–8)
GLUCOSE SERPL-MCNC: 119 MG/DL — HIGH (ref 70–99)
HCG SERPL QL: NEGATIVE — SIGNIFICANT CHANGE UP
HCT VFR BLD CALC: 32.5 % — LOW (ref 37–47)
HGB BLD-MCNC: 10.7 G/DL — LOW (ref 12–16)
IMM GRANULOCYTES NFR BLD AUTO: 0.1 % — SIGNIFICANT CHANGE UP (ref 0.1–0.3)
INR BLD: 0.97 RATIO — SIGNIFICANT CHANGE UP (ref 0.65–1.3)
LYMPHOCYTES # BLD AUTO: 3.28 K/UL — SIGNIFICANT CHANGE UP (ref 1.2–3.4)
LYMPHOCYTES # BLD AUTO: 42.5 % — SIGNIFICANT CHANGE UP (ref 20.5–51.1)
MAGNESIUM SERPL-MCNC: 1.8 MG/DL — SIGNIFICANT CHANGE UP (ref 1.8–2.4)
MCHC RBC-ENTMCNC: 27.8 PG — SIGNIFICANT CHANGE UP (ref 27–31)
MCHC RBC-ENTMCNC: 32.9 G/DL — SIGNIFICANT CHANGE UP (ref 32–37)
MCV RBC AUTO: 84.4 FL — SIGNIFICANT CHANGE UP (ref 81–99)
MONOCYTES # BLD AUTO: 0.58 K/UL — SIGNIFICANT CHANGE UP (ref 0.1–0.6)
MONOCYTES NFR BLD AUTO: 7.5 % — SIGNIFICANT CHANGE UP (ref 1.7–9.3)
NEUTROPHILS # BLD AUTO: 3.51 K/UL — SIGNIFICANT CHANGE UP (ref 1.4–6.5)
NEUTROPHILS NFR BLD AUTO: 45.5 % — SIGNIFICANT CHANGE UP (ref 42.2–75.2)
NRBC # BLD: 0 /100 WBCS — SIGNIFICANT CHANGE UP (ref 0–0)
NT-PROBNP SERPL-SCNC: 299 PG/ML — SIGNIFICANT CHANGE UP (ref 0–300)
PLATELET # BLD AUTO: 345 K/UL — SIGNIFICANT CHANGE UP (ref 130–400)
POTASSIUM SERPL-MCNC: 5.3 MMOL/L — HIGH (ref 3.5–5)
POTASSIUM SERPL-SCNC: 5.3 MMOL/L — HIGH (ref 3.5–5)
PROTHROM AB SERPL-ACNC: 11.2 SEC — SIGNIFICANT CHANGE UP (ref 9.95–12.87)
RBC # BLD: 3.85 M/UL — LOW (ref 4.2–5.4)
RBC # FLD: 15.9 % — HIGH (ref 11.5–14.5)
SODIUM SERPL-SCNC: 137 MMOL/L — SIGNIFICANT CHANGE UP (ref 135–146)
TROPONIN T SERPL-MCNC: <0.01 NG/ML — SIGNIFICANT CHANGE UP
WBC # BLD: 7.72 K/UL — SIGNIFICANT CHANGE UP (ref 4.8–10.8)
WBC # FLD AUTO: 7.72 K/UL — SIGNIFICANT CHANGE UP (ref 4.8–10.8)

## 2018-12-02 RX ORDER — ALBUTEROL 90 UG/1
2.5 AEROSOL, METERED ORAL
Qty: 0 | Refills: 0 | Status: COMPLETED | OUTPATIENT
Start: 2018-12-02 | End: 2018-12-02

## 2018-12-02 RX ORDER — IPRATROPIUM BROMIDE 0.2 MG/ML
1 SOLUTION, NON-ORAL INHALATION ONCE
Qty: 0 | Refills: 0 | Status: COMPLETED | OUTPATIENT
Start: 2018-12-02 | End: 2018-12-02

## 2018-12-02 RX ADMIN — ALBUTEROL 2.5 MILLIGRAM(S): 90 AEROSOL, METERED ORAL at 20:20

## 2018-12-02 RX ADMIN — ALBUTEROL 2.5 MILLIGRAM(S): 90 AEROSOL, METERED ORAL at 20:42

## 2018-12-02 RX ADMIN — Medication 1 PUFF(S): at 19:47

## 2018-12-02 RX ADMIN — ALBUTEROL 2.5 MILLIGRAM(S): 90 AEROSOL, METERED ORAL at 19:47

## 2018-12-02 RX ADMIN — Medication 125 MILLIGRAM(S): at 19:47

## 2018-12-02 NOTE — ED ADULT NURSE REASSESSMENT NOTE - NS ED NURSE REASSESS COMMENT FT1
Pt assessed, a&ox4, steady gait, neuro intact. respirations are easy and unlabored. neb treatments in process. spo2 99% on RA.

## 2018-12-02 NOTE — ED ADULT TRIAGE NOTE - CHIEF COMPLAINT QUOTE
Patient hx of copd - using nebulizer tx at home no relief reports sob and cough with chest tightness. patient has expiratory wheezing - given duoneb en route

## 2018-12-02 NOTE — ED ADULT NURSE NOTE - OBJECTIVE STATEMENT
COPD exacerbation since last night, states she has been coughing for a while with clear phlegm , and it worsened last night and today. EMS gave patient a Combi treatment and states she feels better than before.

## 2018-12-03 VITALS
OXYGEN SATURATION: 98 % | RESPIRATION RATE: 18 BRPM | HEART RATE: 98 BPM | TEMPERATURE: 99 F | SYSTOLIC BLOOD PRESSURE: 133 MMHG | DIASTOLIC BLOOD PRESSURE: 75 MMHG

## 2018-12-03 LAB
ANION GAP SERPL CALC-SCNC: 16 MMOL/L — HIGH (ref 7–14)
BUN SERPL-MCNC: 8 MG/DL — LOW (ref 10–20)
CALCIUM SERPL-MCNC: 9.4 MG/DL — SIGNIFICANT CHANGE UP (ref 8.5–10.1)
CHLORIDE SERPL-SCNC: 97 MMOL/L — LOW (ref 98–110)
CO2 SERPL-SCNC: 20 MMOL/L — SIGNIFICANT CHANGE UP (ref 17–32)
CREAT SERPL-MCNC: 0.9 MG/DL — SIGNIFICANT CHANGE UP (ref 0.7–1.5)
GLUCOSE SERPL-MCNC: 227 MG/DL — HIGH (ref 70–99)
POTASSIUM SERPL-MCNC: 4.5 MMOL/L — SIGNIFICANT CHANGE UP (ref 3.5–5)
POTASSIUM SERPL-SCNC: 4.5 MMOL/L — SIGNIFICANT CHANGE UP (ref 3.5–5)
SODIUM SERPL-SCNC: 133 MMOL/L — LOW (ref 135–146)
TROPONIN T SERPL-MCNC: <0.01 NG/ML — SIGNIFICANT CHANGE UP

## 2018-12-03 RX ORDER — ALBUTEROL 90 UG/1
2 AEROSOL, METERED ORAL
Qty: 1 | Refills: 0 | OUTPATIENT
Start: 2018-12-03 | End: 2018-12-09

## 2018-12-03 RX ORDER — ALBUTEROL 90 UG/1
1 AEROSOL, METERED ORAL
Qty: 20 | Refills: 0 | OUTPATIENT
Start: 2018-12-03 | End: 2018-12-05

## 2018-12-03 RX ORDER — IPRATROPIUM/ALBUTEROL SULFATE 18-103MCG
1 AEROSOL WITH ADAPTER (GRAM) INHALATION
Qty: 20 | Refills: 0 | OUTPATIENT
Start: 2018-12-03 | End: 2018-12-05

## 2018-12-03 NOTE — ED PROVIDER NOTE - PROGRESS NOTE DETAILS
Patient remained stable in ER, improved well during the course of ER stay. States is feeling lot better, want to go home and f/u as out-patient. Patient is awake, alert, o x 3, ambulatory comfortable, tolerated PO. Discussed with patient in detail about his clinical condition, results of the diagnostic studies and the need for close out-patient follow up. [facility] appointment is made, paper work is done and Faxed to the clinic, patient was instructed well to f/u in Medical clinic today at 1245 pm as [facility] follow up, patient verbalized understanding and agreed to go to the clinic today early to attend her appointment. Detail aftercare instructions and return precautions are given.

## 2018-12-03 NOTE — ED PROVIDER NOTE - CARDIOVASCULAR NEGATIVE STATEMENT, MLM
Latonya at [facility] given information on patients positive lyme result   no chest pain and no edema.

## 2018-12-03 NOTE — ED PROVIDER NOTE - OBJECTIVE STATEMENT
patient states that one week ago started having nasal congestion, cough, clear sputum, symptoms continued, has been having on-off sob with wheezing, so was using her inhalers, today after waking up from after noon nap, started having sob, used her inhalers, did not help, states ran out of her nebulizer solution, symptoms continued, so was decided to come to ED. Denies any f/c/n/v/chest pain/abd pain, denies any lower ext pain/swelling, denies any recent travel, denies any risk factors for PE/DVT, denies any palpitations/dizziness/syncope. Patient with similar symptoms in the past from COPD exacerbations.

## 2018-12-07 NOTE — ED PROVIDER NOTE - DISPOSITION TYPE
DATE of INJURY  11-25-18    EMPLOYER  [employer]    CHIEF COMPLAINT  low back strain     HISTORY  acute onset of low back pain while bending and lifting at work   Without fall or associated sx;  continued to work     initial al [facility] 12-1-18 for continuing pain    xray negative; given workplace restrictions      today reports continuing R low back pain aggravated    by limited ROM, intermittent radiation of discomfort    into R posterior thigh, only when walking      Records reviewed  in EPIC  and summarized  as above     PAST MEDICAL HISTORY:    denies previous  back  symptoms,  injury    REVIEW of SYSTEMS:  patient denies:   fall  other injury   new or increasing symptoms  aggravation by ambulation   radiation of discomfort into abdomen, pelvis  weakness in lower extremities   paresthesias in lower extremities   change in bowel or bladder habits     PHYSICAL EXAM     VITAL SIGNS     reviewed in [facility]    GENERAL  APPEARANCE   appears comfortable  not ill or toxic appearing     alert and oriented x 3   calm, cooperative, articulate   mood and affect congruent  speech is fluid        SKIN:    warm & dry      without pallor or cyanosis     EYES  conjunctiva without injection  sclera anicteric   normal spontaneous EOM    NECK  without asymmetry   normal spontaneous ROM  trachea midline     RESPIRATORY  respirations unlabored  normal thoracic excursions     CARDIOVASCULAR  regular pulse  without edema    MUSCULO-SKELETAL   arises, moves, ambulates with mild   difficulty  discomfort  guarding  stiffness,    BACK:   without  tenderness    RANGE OF MOTION   limited ROM with discomfort   flexion to 50%  unable to extend past neutral   increased R pain with leaning to L     MOTOR STRENGTH   able to arise on  toes    BILATERAL LOWER EXTREMITIES:  moves well without apparent difficulty or discomfort      SENSORY  normal gross light touch sensation throughout    SLR  sitting straight leg raise test without discomfort     VASCULAR    normal color, temperature, perfusion throughout    MOTOR  normal  tone and gross strength throughout       Usual and customary discussion / instructions reviewed.      DX  Low back strain     PLAN  Light duty   Frequent stretching and position changes as tolerated  Frequent ice or heat to area as tolerated  Use non-prescription analgesic medication as needed  Recheck       Electronically signed by Lucas Hirsch M.D.          DISCHARGE

## 2018-12-18 ENCOUNTER — INPATIENT (INPATIENT)
Facility: HOSPITAL | Age: 48
LOS: 1 days | Discharge: HOME | End: 2018-12-20
Attending: HOSPITALIST | Admitting: HOSPITALIST

## 2018-12-18 VITALS
RESPIRATION RATE: 22 BRPM | TEMPERATURE: 98 F | HEART RATE: 124 BPM | DIASTOLIC BLOOD PRESSURE: 82 MMHG | SYSTOLIC BLOOD PRESSURE: 136 MMHG | OXYGEN SATURATION: 99 %

## 2018-12-18 DIAGNOSIS — K46.9 UNSPECIFIED ABDOMINAL HERNIA WITHOUT OBSTRUCTION OR GANGRENE: Chronic | ICD-10-CM

## 2018-12-18 LAB
ALBUMIN SERPL ELPH-MCNC: 4.4 G/DL — SIGNIFICANT CHANGE UP (ref 3.5–5.2)
ALP SERPL-CCNC: 97 U/L — SIGNIFICANT CHANGE UP (ref 30–115)
ALT FLD-CCNC: 22 U/L — SIGNIFICANT CHANGE UP (ref 0–41)
ANION GAP SERPL CALC-SCNC: 20 MMOL/L — HIGH (ref 7–14)
AST SERPL-CCNC: 19 U/L — SIGNIFICANT CHANGE UP (ref 0–41)
BASE EXCESS BLDV CALC-SCNC: -1.3 MMOL/L — SIGNIFICANT CHANGE UP (ref -2–2)
BASE EXCESS BLDV CALC-SCNC: -2.5 MMOL/L — LOW (ref -2–2)
BASOPHILS # BLD AUTO: 0.02 K/UL — SIGNIFICANT CHANGE UP (ref 0–0.2)
BASOPHILS NFR BLD AUTO: 0.2 % — SIGNIFICANT CHANGE UP (ref 0–1)
BILIRUB SERPL-MCNC: 0.3 MG/DL — SIGNIFICANT CHANGE UP (ref 0.2–1.2)
BUN SERPL-MCNC: 7 MG/DL — LOW (ref 10–20)
CA-I SERPL-SCNC: 1.26 MMOL/L — SIGNIFICANT CHANGE UP (ref 1.12–1.3)
CA-I SERPL-SCNC: 1.3 MMOL/L — SIGNIFICANT CHANGE UP (ref 1.12–1.3)
CALCIUM SERPL-MCNC: 9.5 MG/DL — SIGNIFICANT CHANGE UP (ref 8.5–10.1)
CHLORIDE SERPL-SCNC: 99 MMOL/L — SIGNIFICANT CHANGE UP (ref 98–110)
CO2 SERPL-SCNC: 21 MMOL/L — SIGNIFICANT CHANGE UP (ref 17–32)
CREAT SERPL-MCNC: 0.9 MG/DL — SIGNIFICANT CHANGE UP (ref 0.7–1.5)
EOSINOPHIL # BLD AUTO: 0 K/UL — SIGNIFICANT CHANGE UP (ref 0–0.7)
EOSINOPHIL NFR BLD AUTO: 0 % — SIGNIFICANT CHANGE UP (ref 0–8)
GAS PNL BLDV: 141 MMOL/L — SIGNIFICANT CHANGE UP (ref 136–145)
GAS PNL BLDV: 143 MMOL/L — SIGNIFICANT CHANGE UP (ref 136–145)
GAS PNL BLDV: SIGNIFICANT CHANGE UP
GAS PNL BLDV: SIGNIFICANT CHANGE UP
GLUCOSE SERPL-MCNC: 160 MG/DL — HIGH (ref 70–99)
HCO3 BLDV-SCNC: 22 MMOL/L — SIGNIFICANT CHANGE UP (ref 22–29)
HCO3 BLDV-SCNC: 24 MMOL/L — SIGNIFICANT CHANGE UP (ref 22–29)
HCT VFR BLD CALC: 33.2 % — LOW (ref 37–47)
HCT VFR BLDA CALC: 34.1 % — SIGNIFICANT CHANGE UP (ref 34–44)
HCT VFR BLDA CALC: 37.7 % — SIGNIFICANT CHANGE UP (ref 34–44)
HGB BLD CALC-MCNC: 11.1 G/DL — LOW (ref 14–18)
HGB BLD CALC-MCNC: 12.3 G/DL — LOW (ref 14–18)
HGB BLD-MCNC: 10.9 G/DL — LOW (ref 12–16)
IMM GRANULOCYTES NFR BLD AUTO: 0.4 % — HIGH (ref 0.1–0.3)
LACTATE BLDV-MCNC: 2.9 MMOL/L — HIGH (ref 0.5–1.6)
LACTATE BLDV-MCNC: 3.1 MMOL/L — HIGH (ref 0.5–1.6)
LYMPHOCYTES # BLD AUTO: 0.59 K/UL — LOW (ref 1.2–3.4)
LYMPHOCYTES # BLD AUTO: 5 % — LOW (ref 20.5–51.1)
MCHC RBC-ENTMCNC: 28.3 PG — SIGNIFICANT CHANGE UP (ref 27–31)
MCHC RBC-ENTMCNC: 32.8 G/DL — SIGNIFICANT CHANGE UP (ref 32–37)
MCV RBC AUTO: 86.2 FL — SIGNIFICANT CHANGE UP (ref 81–99)
MONOCYTES # BLD AUTO: 0.1 K/UL — SIGNIFICANT CHANGE UP (ref 0.1–0.6)
MONOCYTES NFR BLD AUTO: 0.8 % — LOW (ref 1.7–9.3)
NEUTROPHILS # BLD AUTO: 11.1 K/UL — HIGH (ref 1.4–6.5)
NEUTROPHILS NFR BLD AUTO: 93.6 % — HIGH (ref 42.2–75.2)
NRBC # BLD: 0 /100 WBCS — SIGNIFICANT CHANGE UP (ref 0–0)
PCO2 BLDV: 35 MMHG — LOW (ref 41–51)
PCO2 BLDV: 41 MMHG — SIGNIFICANT CHANGE UP (ref 41–51)
PH BLDV: 7.38 — SIGNIFICANT CHANGE UP (ref 7.26–7.43)
PH BLDV: 7.4 — SIGNIFICANT CHANGE UP (ref 7.26–7.43)
PLATELET # BLD AUTO: 354 K/UL — SIGNIFICANT CHANGE UP (ref 130–400)
PO2 BLDV: 29 MMHG — SIGNIFICANT CHANGE UP (ref 20–40)
PO2 BLDV: 54 MMHG — HIGH (ref 20–40)
POTASSIUM BLDV-SCNC: 3.9 MMOL/L — SIGNIFICANT CHANGE UP (ref 3.3–5.6)
POTASSIUM BLDV-SCNC: 4.2 MMOL/L — SIGNIFICANT CHANGE UP (ref 3.3–5.6)
POTASSIUM SERPL-MCNC: 4.3 MMOL/L — SIGNIFICANT CHANGE UP (ref 3.5–5)
POTASSIUM SERPL-SCNC: 4.3 MMOL/L — SIGNIFICANT CHANGE UP (ref 3.5–5)
PROT SERPL-MCNC: 8.1 G/DL — HIGH (ref 6–8)
RBC # BLD: 3.85 M/UL — LOW (ref 4.2–5.4)
RBC # FLD: 16.6 % — HIGH (ref 11.5–14.5)
SAO2 % BLDV: 51 % — SIGNIFICANT CHANGE UP
SAO2 % BLDV: 87 % — SIGNIFICANT CHANGE UP
SODIUM SERPL-SCNC: 140 MMOL/L — SIGNIFICANT CHANGE UP (ref 135–146)
TROPONIN T SERPL-MCNC: <0.01 NG/ML — SIGNIFICANT CHANGE UP
WBC # BLD: 11.86 K/UL — HIGH (ref 4.8–10.8)
WBC # FLD AUTO: 11.86 K/UL — HIGH (ref 4.8–10.8)

## 2018-12-18 RX ORDER — IPRATROPIUM/ALBUTEROL SULFATE 18-103MCG
3 AEROSOL WITH ADAPTER (GRAM) INHALATION
Qty: 0 | Refills: 0 | Status: COMPLETED | OUTPATIENT
Start: 2018-12-18 | End: 2018-12-18

## 2018-12-18 RX ORDER — CHLORHEXIDINE GLUCONATE 213 G/1000ML
1 SOLUTION TOPICAL
Qty: 0 | Refills: 0 | Status: DISCONTINUED | OUTPATIENT
Start: 2018-12-18 | End: 2018-12-20

## 2018-12-18 RX ORDER — SODIUM CHLORIDE 9 MG/ML
1000 INJECTION INTRAMUSCULAR; INTRAVENOUS; SUBCUTANEOUS ONCE
Qty: 0 | Refills: 0 | Status: COMPLETED | OUTPATIENT
Start: 2018-12-18 | End: 2018-12-18

## 2018-12-18 RX ADMIN — Medication 3 MILLILITER(S): at 16:37

## 2018-12-18 RX ADMIN — SODIUM CHLORIDE 1000 MILLILITER(S): 9 INJECTION INTRAMUSCULAR; INTRAVENOUS; SUBCUTANEOUS at 17:50

## 2018-12-18 RX ADMIN — Medication 3 MILLILITER(S): at 16:00

## 2018-12-18 RX ADMIN — Medication 3 MILLILITER(S): at 15:06

## 2018-12-18 NOTE — ED PROVIDER NOTE - NS ED ROS FT
Review of Systems:  	•	CONSTITUTIONAL - no fever, no diaphoresis, no chills  	•	SKIN - no rash  	•	HEMATOLOGIC - no bleeding, no bruising  	•	EYES - no eye pain, no blurry vision  	•	ENT - no congestion  	•	RESPIRATORY - +shortness of breath, +cough  	•	CARDIAC - no chest pain, no palpitations  	•	GI - no abd pain, no nausea, no vomiting, no diarrhea, no constipation  	•	GENITO-URINARY - no dysuria; no hematuria, no increased urinary frequency  	•	MUSCULOSKELETAL - no joint paint, no swelling, no redness  	•	NEUROLOGIC - no weakness, no headache, no paresthesias, no LOC  	All other ROS are negative except as documented in HPI.

## 2018-12-18 NOTE — ED ADULT NURSE NOTE - NSIMPLEMENTINTERV_GEN_ALL_ED
Implemented All Fall Risk Interventions:  [place] to call system. Call bell, personal items and telephone within reach. Instruct patient to call for assistance. Room bathroom lighting operational. Non-slip footwear when patient is off stretcher. Physically safe environment: no spills, clutter or unnecessary equipment. Stretcher in lowest position, wheels locked, appropriate side rails in place. Provide visual cue, wrist band, yellow gown, etc. Monitor gait and stability. Monitor for mental status changes and reorient to person, place, and time. Review medications for side effects contributing to fall risk. Reinforce activity limits and safety measures with patient and family.

## 2018-12-18 NOTE — ED ADULT NURSE NOTE - OBJECTIVE STATEMENT
Pt BIBA from home,  c/o difficulty breathing, and coughing x 2days. HX of copd. Denies any other symptoms

## 2018-12-18 NOTE — ED ADULT NURSE REASSESSMENT NOTE - NS ED NURSE REASSESS COMMENT FT1
Pt assessed. IV patent. IV abx given per MD order. Tolerated her diet. Denies any pain. Will cont to monitor.

## 2018-12-18 NOTE — ED PROVIDER NOTE - PROGRESS NOTE DETAILS
47 y/o F with PMHx COPD not on Homo-2, previous admission without intubation, here for x3 days of cough, congestion and wheezing. No f/c, leg swelling or CP. PE: pt has moderately good air movement, (+) wheezing b/l, S1S2, mild tachycardia, abd soft NTND, no calf swelling. IMP: SOB suspected COPD. Pt got steroids by EMS. Nebs, XR, labs and reeval.

## 2018-12-18 NOTE — ED PROVIDER NOTE - PHYSICAL EXAMINATION
VITAL SIGNS: I have reviewed nursing notes and confirm.  CONSTITUTIONAL: Well-developed; well-nourished; in no acute distress.  SKIN: Skin exam is warm and dry, no acute rash.  HEAD: Normocephalic; atraumatic.  EYES: PERRL, EOM intact; conjunctiva and sclera clear.  ENT: No nasal discharge; airway clear.   NECK: Supple; non tender.  CARD: S1, S2 normal; no murmurs, gallops, or rubs. +Tachycardic. Regular rhythm.  RESP: +Wheezing bilaterally with prolonged expiration. No accessory muscle use.   ABD: Normal bowel sounds; soft; non-distended; non-tender.   EXT: Normal ROM. No edema.  LYMPH: No acute cervical adenopathy.  NEURO: Alert, oriented. Grossly unremarkable. No focal deficits.  PSYCH: Cooperative, appropriate.

## 2018-12-18 NOTE — ED ADULT TRIAGE NOTE - CHIEF COMPLAINT QUOTE
BIBA from home with difficulty breathing/ given combivent treatments and dexamethasone 12 mg IVP via ems

## 2018-12-18 NOTE — ED PROVIDER NOTE - OBJECTIVE STATEMENT
47 yo F with pmhx of COPD (not on home O2) presenting with shortness of breath and cough x 3 days along with wheezing which worsened today. Pt given decadron by ems with improvement. No cp, leg swelling, fever, chills, abdominal pain, nausea, vomiting, diarrhea, back pain, urinary symptoms, headache, dizziness, paresthesias, or weakness.

## 2018-12-19 ENCOUNTER — TRANSCRIPTION ENCOUNTER (OUTPATIENT)
Age: 48
End: 2018-12-19

## 2018-12-19 VITALS
TEMPERATURE: 97 F | DIASTOLIC BLOOD PRESSURE: 83 MMHG | OXYGEN SATURATION: 97 % | HEART RATE: 106 BPM | RESPIRATION RATE: 18 BRPM | SYSTOLIC BLOOD PRESSURE: 155 MMHG

## 2018-12-19 LAB
ALBUMIN SERPL ELPH-MCNC: 4.2 G/DL — SIGNIFICANT CHANGE UP (ref 3.5–5.2)
ALP SERPL-CCNC: 79 U/L — SIGNIFICANT CHANGE UP (ref 30–115)
ALT FLD-CCNC: 22 U/L — SIGNIFICANT CHANGE UP (ref 0–41)
ANION GAP SERPL CALC-SCNC: 20 MMOL/L — HIGH (ref 7–14)
AST SERPL-CCNC: 20 U/L — SIGNIFICANT CHANGE UP (ref 0–41)
BASOPHILS # BLD AUTO: 0.01 K/UL — SIGNIFICANT CHANGE UP (ref 0–0.2)
BASOPHILS NFR BLD AUTO: 0.1 % — SIGNIFICANT CHANGE UP (ref 0–1)
BILIRUB SERPL-MCNC: 0.4 MG/DL — SIGNIFICANT CHANGE UP (ref 0.2–1.2)
BUN SERPL-MCNC: 7 MG/DL — LOW (ref 10–20)
CALCIUM SERPL-MCNC: 9.8 MG/DL — SIGNIFICANT CHANGE UP (ref 8.5–10.1)
CHLORIDE SERPL-SCNC: 101 MMOL/L — SIGNIFICANT CHANGE UP (ref 98–110)
CK MB CFR SERPL CALC: 5.2 NG/ML — SIGNIFICANT CHANGE UP (ref 0.6–6.3)
CK SERPL-CCNC: 213 U/L — SIGNIFICANT CHANGE UP (ref 0–225)
CO2 SERPL-SCNC: 19 MMOL/L — SIGNIFICANT CHANGE UP (ref 17–32)
CREAT SERPL-MCNC: 0.9 MG/DL — SIGNIFICANT CHANGE UP (ref 0.7–1.5)
EOSINOPHIL # BLD AUTO: 0 K/UL — SIGNIFICANT CHANGE UP (ref 0–0.7)
EOSINOPHIL NFR BLD AUTO: 0 % — SIGNIFICANT CHANGE UP (ref 0–8)
GLUCOSE SERPL-MCNC: 180 MG/DL — HIGH (ref 70–99)
HCT VFR BLD CALC: 32.6 % — LOW (ref 37–47)
HGB BLD-MCNC: 10.4 G/DL — LOW (ref 12–16)
IMM GRANULOCYTES NFR BLD AUTO: 0.5 % — HIGH (ref 0.1–0.3)
LACTATE SERPL-SCNC: 2.3 MMOL/L — HIGH (ref 0.5–2.2)
LACTATE SERPL-SCNC: 4.3 MMOL/L — CRITICAL HIGH (ref 0.5–2.2)
LYMPHOCYTES # BLD AUTO: 0.68 K/UL — LOW (ref 1.2–3.4)
LYMPHOCYTES # BLD AUTO: 6.2 % — LOW (ref 20.5–51.1)
MAGNESIUM SERPL-MCNC: 1.8 MG/DL — SIGNIFICANT CHANGE UP (ref 1.8–2.4)
MCHC RBC-ENTMCNC: 28 PG — SIGNIFICANT CHANGE UP (ref 27–31)
MCHC RBC-ENTMCNC: 31.9 G/DL — LOW (ref 32–37)
MCV RBC AUTO: 87.6 FL — SIGNIFICANT CHANGE UP (ref 81–99)
MONOCYTES # BLD AUTO: 0.22 K/UL — SIGNIFICANT CHANGE UP (ref 0.1–0.6)
MONOCYTES NFR BLD AUTO: 2 % — SIGNIFICANT CHANGE UP (ref 1.7–9.3)
NEUTROPHILS # BLD AUTO: 10.08 K/UL — HIGH (ref 1.4–6.5)
NEUTROPHILS NFR BLD AUTO: 91.2 % — HIGH (ref 42.2–75.2)
PLATELET # BLD AUTO: 324 K/UL — SIGNIFICANT CHANGE UP (ref 130–400)
POTASSIUM SERPL-MCNC: 4.4 MMOL/L — SIGNIFICANT CHANGE UP (ref 3.5–5)
POTASSIUM SERPL-SCNC: 4.4 MMOL/L — SIGNIFICANT CHANGE UP (ref 3.5–5)
PROT SERPL-MCNC: 7.7 G/DL — SIGNIFICANT CHANGE UP (ref 6–8)
RAPID RVP RESULT: DETECTED
RBC # BLD: 3.72 M/UL — LOW (ref 4.2–5.4)
RBC # FLD: 16.9 % — HIGH (ref 11.5–14.5)
RV+EV RNA SPEC QL NAA+PROBE: DETECTED
SODIUM SERPL-SCNC: 140 MMOL/L — SIGNIFICANT CHANGE UP (ref 135–146)
TROPONIN T SERPL-MCNC: <0.01 NG/ML — SIGNIFICANT CHANGE UP
WBC # BLD: 11.04 K/UL — HIGH (ref 4.8–10.8)
WBC # FLD AUTO: 11.04 K/UL — HIGH (ref 4.8–10.8)

## 2018-12-19 RX ORDER — IPRATROPIUM/ALBUTEROL SULFATE 18-103MCG
3 AEROSOL WITH ADAPTER (GRAM) INHALATION ONCE
Qty: 0 | Refills: 0 | Status: DISCONTINUED | OUTPATIENT
Start: 2018-12-19 | End: 2018-12-20

## 2018-12-19 RX ORDER — ALBUTEROL 90 UG/1
1 AEROSOL, METERED ORAL EVERY 4 HOURS
Qty: 0 | Refills: 0 | Status: DISCONTINUED | OUTPATIENT
Start: 2018-12-19 | End: 2018-12-20

## 2018-12-19 RX ORDER — BUDESONIDE AND FORMOTEROL FUMARATE DIHYDRATE 160; 4.5 UG/1; UG/1
2 AEROSOL RESPIRATORY (INHALATION)
Qty: 0 | Refills: 0 | Status: DISCONTINUED | OUTPATIENT
Start: 2018-12-19 | End: 2018-12-20

## 2018-12-19 RX ORDER — IPRATROPIUM/ALBUTEROL SULFATE 18-103MCG
3 AEROSOL WITH ADAPTER (GRAM) INHALATION EVERY 6 HOURS
Qty: 0 | Refills: 0 | Status: DISCONTINUED | OUTPATIENT
Start: 2018-12-19 | End: 2018-12-20

## 2018-12-19 RX ORDER — ALBUTEROL 90 UG/1
0 AEROSOL, METERED ORAL
Qty: 0 | Refills: 0 | COMMUNITY

## 2018-12-19 RX ORDER — UMECLIDINIUM 62.5 UG/1
0 AEROSOL, POWDER ORAL
Qty: 0 | Refills: 0 | COMMUNITY

## 2018-12-19 RX ORDER — PANTOPRAZOLE SODIUM 20 MG/1
40 TABLET, DELAYED RELEASE ORAL
Qty: 0 | Refills: 0 | Status: DISCONTINUED | OUTPATIENT
Start: 2018-12-19 | End: 2018-12-20

## 2018-12-19 RX ORDER — PANTOPRAZOLE SODIUM 20 MG/1
0 TABLET, DELAYED RELEASE ORAL
Qty: 0 | Refills: 0 | COMMUNITY

## 2018-12-19 RX ORDER — FLUTICASONE PROPIONATE AND SALMETEROL 50; 250 UG/1; UG/1
0 POWDER ORAL; RESPIRATORY (INHALATION)
Qty: 0 | Refills: 0 | COMMUNITY

## 2018-12-19 RX ORDER — SODIUM CHLORIDE 9 MG/ML
1000 INJECTION INTRAMUSCULAR; INTRAVENOUS; SUBCUTANEOUS
Qty: 0 | Refills: 0 | Status: DISCONTINUED | OUTPATIENT
Start: 2018-12-19 | End: 2018-12-20

## 2018-12-19 RX ORDER — SODIUM CHLORIDE 0.65 %
1 AEROSOL, SPRAY (ML) NASAL EVERY 4 HOURS
Qty: 0 | Refills: 0 | Status: DISCONTINUED | OUTPATIENT
Start: 2018-12-19 | End: 2018-12-20

## 2018-12-19 RX ADMIN — Medication 3 MILLILITER(S): at 14:26

## 2018-12-19 RX ADMIN — Medication 60 MILLIGRAM(S): at 17:30

## 2018-12-19 RX ADMIN — Medication 60 MILLIGRAM(S): at 06:05

## 2018-12-19 RX ADMIN — Medication 3 MILLILITER(S): at 09:09

## 2018-12-19 RX ADMIN — SODIUM CHLORIDE 75 MILLILITER(S): 9 INJECTION INTRAMUSCULAR; INTRAVENOUS; SUBCUTANEOUS at 21:10

## 2018-12-19 RX ADMIN — Medication 3 MILLILITER(S): at 05:02

## 2018-12-19 RX ADMIN — PANTOPRAZOLE SODIUM 40 MILLIGRAM(S): 20 TABLET, DELAYED RELEASE ORAL at 06:05

## 2018-12-19 NOTE — DISCHARGE NOTE ADULT - CARE PLAN
Principal Discharge DX:	COPD exacerbation  Goal:	medical management, pulmonary follow up outpatient  Assessment and plan of treatment:	You came to the hospital for treatment of COPD exacerbation.   You were given IV steroids and then transitioned to PO steroids and a short antibiotic course  You did not require supplement oxygen here as you were saturating well on room air during the course of your admission  Please follow up with pulmonology outpatient within 1-2 weeks of discharge for follow up    Moreover, cessation of cigarette smoking will greatly improve your recurrent symptoms (see below), in addition to a  work environment  Secondary Diagnosis:	Cigarette smoker  Goal:	Smoking cessation  Assessment and plan of treatment:	It is critical that you quit smoking in order to improve your recurrent exacerbations.   Information will be provided for a program that you can call to help provide nicotine patches to help aid in smoking cessation. Please follow up with this program   Additionally, please follow up with your primary care doctor for routine follow up and to help provide any additional resources in your path to smoking cessation.

## 2018-12-19 NOTE — DISCHARGE NOTE ADULT - PATIENT PORTAL LINK FT
You can access the [facility] Patient Portal, offered by [facility], by registering with the following website: [email or web address]

## 2018-12-19 NOTE — DISCHARGE NOTE ADULT - MEDICATION SUMMARY - MEDICATIONS TO STOP TAKING
I will STOP taking the medications listed below when I get home from the hospital:    tiotropium 18 mcg inhalation capsule  -- 1 cap(s) inhaled once a day    Ventolin HFA  -- 2 puff(s) inhaled every 6 hours, As Needed

## 2018-12-19 NOTE — DISCHARGE NOTE ADULT - MEDICATION SUMMARY - MEDICATIONS TO TAKE
I will START or STAY ON the medications listed below when I get home from the hospital:    predniSONE 20 mg oral tablet  -- 2 tab(s) by mouth once a day   -- It is very important that you take or use this exactly as directed.  Do not skip doses or discontinue unless directed by your doctor.  Obtain medical advice before taking any non-prescription drugs as some may affect the action of this medication.  Take with food or milk.    -- Indication: For COPD exacerbation    methIMAzole 5 mg oral tablet  -- 1 cap(s) by mouth once a day  -- Indication: For Thyroid disease    Advair Diskus 100 mcg-50 mcg inhalation powder  -- 1 puff(s) inhaled 2 times a day  -- Indication: For COPD    Incruse Ellipta 62.5 mcg/inh inhalation powder  -- 2 puff(s) inhaled once a day  -- Indication: For COPD     albuterol 90 mcg/inh inhalation aerosol  -- 1 puff(s) inhaled every 4 hours, As needed, Bronchospasm  -- Indication: For COPD     ipratropium-albuterol 0.5 mg-2.5 mg/3 mLinhalation solution  -- 0.5 milliliter(s) by nebulizer 3 times a day, As Needed   -- For inhalation only.  It is very important that you take or use this exactly as directed.  Do not skip doses or discontinue unless directed by your doctor.  Obtain medical advice before taking any non-prescription drugs as some may affect the action of this medication.    -- Indication: For COPD    pantoprazole  -- 1 tab(s) by mouth once a day  -- Indication: For GI prophylaxis and Reflux     levoFLOXacin 750 mg oral tablet  -- 1 tab(s) by mouth once a day  -- Indication: For COPD exaceration

## 2018-12-19 NOTE — H&P ADULT - ASSESSMENT
49 yo F with hx of COPD (not on home O2) ,never intubated ,active smoker GERD ,Hyperthyroidism ,anemia presented to ER  with shortness of breath and cough x 3 days with whitish sputum along with wheezing ,which worsened over night .    #worsening SOB with wheezing and cough ;  likely 2/2 COPD exacerbation  active smoker  r/o any viral illness  follow RVP  cw IV Levaquin ,Symbicort Duoneb ,PRN ventolin and IV steroids 60 mg Q8 hr  consider Pulm eval with Dr Bojorquez if deteriorates    # Hyperthyroidism;  cw methimazole  TSH within normal range in Sept,18    #GERD; cw protonics     #DVT ppx ; with Lovenox   Regular diet   ambulate as tolerated  Full code    #Dispo; after medical stabilization

## 2018-12-19 NOTE — DISCHARGE NOTE ADULT - HOSPITAL COURSE
47 yo F with hx of COPD (not on home O2) ,never intubated ,active smoker GERD ,Hyperthyroidism ,anemia presented to ER  with shortness of breath and cough x 3 days with whitish sputum along with wheezing ,which worsened over night .    # COPD exacerbation- still wheezing and has SOB  + wheezing on exam, measured SpO2 99% on room air  active smoker, not interested in smoking cessation at this time  cw solumedrol IV q 12 hours, will assess tomorrow if she can be switched to PO  cw symbicort inhaler  cw nebs q 6 hrs  RVP + enterovirus/rhinovirus  cw levaquin- will switch to PO (d1 today, plan to complete 5 day course total)    #Elevated Lactic Acid- ddx includes COPD exacerbation vs. dehydration  - 3.1 > 2.9 > 4.3 (12/19)  - start IV fluid hydration NS @ 75 cc/hr  - Repeat lactic acid q 4 hours, stop after 3 events     #Tachycardia  - no CP  - Likely due to side effect from nebulizer treatment     # Hyperthyroidism;  cw methimazole  TSH within normal range in Sept,18    #GERD; cw protonics

## 2018-12-19 NOTE — DISCHARGE NOTE ADULT - PLAN OF CARE
medical management, pulmonary follow up outpatient You came to the hospital for treatment of COPD exacerbation.   You were given IV steroids and then transitioned to PO steroids and a short antibiotic course  You did not require supplement oxygen here as you were saturating well on room air during the course of your admission  Please follow up with pulmonology outpatient within 1-2 weeks of discharge for follow up    Moreover, cessation of cigarette smoking will greatly improve your recurrent symptoms (see below), in addition to a  work environment Smoking cessation It is critical that you quit smoking in order to improve your recurrent exacerbations.   Information will be provided for a program that you can call to help provide nicotine patches to help aid in smoking cessation. Please follow up with this program   Additionally, please follow up with your primary care doctor for routine follow up and to help provide any additional resources in your path to smoking cessation.

## 2018-12-19 NOTE — DISCHARGE NOTE ADULT - CARE PROVIDER_API CALL
Vin Bojorquez), Critical Care Medicine; Geriatric Medicine; Internal Medicine; Pulmonary Disease  [address] [place] [address]  Phone: [phone]  Fax: [phone]

## 2018-12-19 NOTE — H&P ADULT - ATTENDING COMMENTS
I saw and examined the patient independently. I agree with resident's note above for HPI, exam findings and plan of care as noted above and edited where appropriate with following additions:     Today morning, patient still c/o sob at rest and with ambulation but does not think she needs supplemental O2. does not think she is at her baseline respiratory status. c/o persistent cough and wheezing. patient reports she has been having exacerbations almost every 2 weeks for last couple of months since after starting new job where she is exposed to toxins and is planning to quit that job plus she is active smoker- advised smoking cessation and offered help to quit- but patient refused help to quit.     patient is tachycardiac- reports after neb treatment with expiratory wheezing on exam.    Assessment:     worsening dyspnea likely 2/2 COPD exacerbation possibly viral illness with superimposed bronchitis/low suspiction for PNA  Hyperthyroidism  GERD  mild leukocytosis possibly due to steroids  Normocytic anemia possibly chronic disease  SIRS present on admission  lactic acidosis   sinus tachycardia      plan:   stable SPO2 on RA, check SPO2 with ambulation for possible need for supplemental O2.  c/w nebs  c/w solumedrol Iv q12 hr - still wheezing and sob.   c/w symbicort inhaler  complete levofloxacin for 5 days for possible bronchitis  smoking cessation advised- not willing to quit- refused medication help to quit.  repeat lactic acid q4hr , stop after 3 events.  start IV fluid hydration NS @ 75cc/hr.    DVT/GI prophylaxis

## 2018-12-19 NOTE — H&P ADULT - NSHPLABSRESULTS_GEN_ALL_CORE
10.9   11.86 )-----------( 354      ( 18 Dec 2018 16:25 )             33.2   12-18    140  |  99  |  7<L>  ----------------------------<  160<H>  4.3   |  21  |  0.9    Ca    9.5      18 Dec 2018 16:25    TPro  8.1<H>  /  Alb  4.4  /  TBili  0.3  /  DBili  x   /  AST  19  /  ALT  22  /  AlkPhos  97  12-18  CARDIAC MARKERS ( 18 Dec 2018 16:25 )  x     / <0.01 ng/mL / x     / x     / x        LIVER FUNCTIONS - ( 18 Dec 2018 16:25 )  Alb: 4.4 g/dL / Pro: 8.1 g/dL / ALK PHOS: 97 U/L / ALT: 22 U/L / AST: 19 U/L / GGT: x         < from: Xray Chest 2 Views PA/Lat (12.18.18 @ 15:36) >    Impression:      No radiographic evidence of acute cardiopulmonary disease.      < end of copied text >    < from: 12 Lead ECG (12.18.18 @ 15:49) >    Diagnosis Line Sinus tachycardia  Otherwise normal ECG    < end of copied text >

## 2018-12-19 NOTE — H&P ADULT - NSHPPHYSICALEXAM_GEN_ALL_CORE
Vital Signs Last 24 Hrs  T(C): 36.1 (18 Dec 2018 20:39), Max: 37.1 (18 Dec 2018 15:57)  T(F): 97 (18 Dec 2018 20:39), Max: 98.8 (18 Dec 2018 15:57)  HR: 108 (18 Dec 2018 20:39) (108 - 133)  BP: 150/93 (18 Dec 2018 20:39) (136/82 - 163/84)  BP(mean): --  RR: 20 (18 Dec 2018 20:39) (20 - 22)  SpO2: 98% (18 Dec 2018 20:39) (98% - 99%)    PHYSICAL EXAM:  Constitutional: in mild respiratory distress and tachypneic      Eyes: no pallor or icterus     ENMT: within normal range    Neck: no JVD     Back: no CVA tenderness    Respiratory: VB +B/L rhonchi with prolonged expiration     Cardiovascular: S1 +S2+0    Gastrointestinal: soft ,non tender ,BS + ,no HS megaly    Extremities: no LE edema     Neurological: AAO*3 ,non focal

## 2018-12-20 LAB
ANION GAP SERPL CALC-SCNC: 18 MMOL/L — HIGH (ref 7–14)
BASOPHILS # BLD AUTO: 0.01 K/UL — SIGNIFICANT CHANGE UP (ref 0–0.2)
BASOPHILS NFR BLD AUTO: 0.1 % — SIGNIFICANT CHANGE UP (ref 0–1)
BUN SERPL-MCNC: 9 MG/DL — LOW (ref 10–20)
CALCIUM SERPL-MCNC: 9.3 MG/DL — SIGNIFICANT CHANGE UP (ref 8.5–10.1)
CHLORIDE SERPL-SCNC: 100 MMOL/L — SIGNIFICANT CHANGE UP (ref 98–110)
CO2 SERPL-SCNC: 24 MMOL/L — SIGNIFICANT CHANGE UP (ref 17–32)
CREAT SERPL-MCNC: 0.8 MG/DL — SIGNIFICANT CHANGE UP (ref 0.7–1.5)
EOSINOPHIL # BLD AUTO: 0 K/UL — SIGNIFICANT CHANGE UP (ref 0–0.7)
EOSINOPHIL NFR BLD AUTO: 0 % — SIGNIFICANT CHANGE UP (ref 0–8)
GLUCOSE SERPL-MCNC: 125 MG/DL — HIGH (ref 70–99)
HCT VFR BLD CALC: 31.9 % — LOW (ref 37–47)
HGB BLD-MCNC: 10.1 G/DL — LOW (ref 12–16)
IMM GRANULOCYTES NFR BLD AUTO: 0.4 % — HIGH (ref 0.1–0.3)
LACTATE SERPL-SCNC: 2 MMOL/L — SIGNIFICANT CHANGE UP (ref 0.5–2.2)
LYMPHOCYTES # BLD AUTO: 1.56 K/UL — SIGNIFICANT CHANGE UP (ref 1.2–3.4)
LYMPHOCYTES # BLD AUTO: 13 % — LOW (ref 20.5–51.1)
MAGNESIUM SERPL-MCNC: 1.8 MG/DL — SIGNIFICANT CHANGE UP (ref 1.8–2.4)
MCHC RBC-ENTMCNC: 28.1 PG — SIGNIFICANT CHANGE UP (ref 27–31)
MCHC RBC-ENTMCNC: 31.7 G/DL — LOW (ref 32–37)
MCV RBC AUTO: 88.9 FL — SIGNIFICANT CHANGE UP (ref 81–99)
MONOCYTES # BLD AUTO: 0.68 K/UL — HIGH (ref 0.1–0.6)
MONOCYTES NFR BLD AUTO: 5.7 % — SIGNIFICANT CHANGE UP (ref 1.7–9.3)
NEUTROPHILS # BLD AUTO: 9.73 K/UL — HIGH (ref 1.4–6.5)
NEUTROPHILS NFR BLD AUTO: 80.8 % — HIGH (ref 42.2–75.2)
NRBC # BLD: 0 /100 WBCS — SIGNIFICANT CHANGE UP (ref 0–0)
PLATELET # BLD AUTO: 344 K/UL — SIGNIFICANT CHANGE UP (ref 130–400)
POTASSIUM SERPL-MCNC: 4.1 MMOL/L — SIGNIFICANT CHANGE UP (ref 3.5–5)
POTASSIUM SERPL-SCNC: 4.1 MMOL/L — SIGNIFICANT CHANGE UP (ref 3.5–5)
RBC # BLD: 3.59 M/UL — LOW (ref 4.2–5.4)
RBC # FLD: 17.3 % — HIGH (ref 11.5–14.5)
SODIUM SERPL-SCNC: 142 MMOL/L — SIGNIFICANT CHANGE UP (ref 135–146)
WBC # BLD: 12.03 K/UL — HIGH (ref 4.8–10.8)
WBC # FLD AUTO: 12.03 K/UL — HIGH (ref 4.8–10.8)

## 2018-12-20 RX ORDER — FLUTICASONE PROPIONATE 50 MCG
1 SPRAY, SUSPENSION NASAL DAILY
Qty: 0 | Refills: 0 | Status: DISCONTINUED | OUTPATIENT
Start: 2018-12-20 | End: 2018-12-20

## 2018-12-20 RX ORDER — IPRATROPIUM/ALBUTEROL SULFATE 18-103MCG
0.5 AEROSOL WITH ADAPTER (GRAM) INHALATION
Qty: 12 | Refills: 0
Start: 2018-12-20

## 2018-12-20 RX ORDER — ALBUTEROL 90 UG/1
1 AEROSOL, METERED ORAL
Qty: 0 | Refills: 0 | COMMUNITY
Start: 2018-12-20

## 2018-12-20 RX ORDER — ALBUTEROL 90 UG/1
2 AEROSOL, METERED ORAL
Qty: 0 | Refills: 0 | COMMUNITY

## 2018-12-20 RX ORDER — ALBUTEROL 90 UG/1
1 AEROSOL, METERED ORAL
Qty: 5 | Refills: 0
Start: 2018-12-20

## 2018-12-20 RX ORDER — CIPROFLOXACIN LACTATE 400MG/40ML
1 VIAL (ML) INTRAVENOUS
Qty: 5 | Refills: 0
Start: 2018-12-20 | End: 2018-12-24

## 2018-12-20 RX ADMIN — PANTOPRAZOLE SODIUM 40 MILLIGRAM(S): 20 TABLET, DELAYED RELEASE ORAL at 06:08

## 2018-12-20 RX ADMIN — Medication 60 MILLIGRAM(S): at 06:24

## 2018-12-20 RX ADMIN — Medication 3 MILLILITER(S): at 03:12

## 2018-12-20 RX ADMIN — Medication 1 SPRAY(S): at 11:45

## 2018-12-20 RX ADMIN — Medication 3 MILLILITER(S): at 08:57

## 2018-12-20 NOTE — PROGRESS NOTE ADULT - ASSESSMENT
ANDREW FISCHER 48y Female  MRN#: [id number]       SUBJECTIVE  Patient is a 48y old Female who presents with a chief complaint of worsening of difficulty breathing ,cough and wheezing since yesterday night (19 Dec 2018 00:06)  Currently admitted to medicine with the primary diagnosis of COPD exacerbation    Today is hospital day 1d, she continues to feel short of breath and has productive cough with white phlegm. Spoke to patient about smoking cessation, but she is not interested in quitting at this time    Present Today:           IV Fluids ()No/ (x)Yes? Type: NS Rate: 75 cc Indication: hydration      OBJECTIVE  PAST MEDICAL & SURGICAL HISTORY  Anemia  GERD (gastroesophageal reflux disease)  Hyperthyroidism  COPD (chronic obstructive pulmonary disease)  Abdominal hernia    ALLERGIES:  No Known Allergies    MEDICATIONS:  STANDING MEDICATIONS  ALBUTerol/ipratropium for Nebulization 3 milliLiter(s) Nebulizer every 6 hours  ALBUTerol/ipratropium for Nebulization. 3 milliLiter(s) Nebulizer once  buDESOnide 160 MICROgram(s)/formoterol 4.5 MICROgram(s) Inhaler 2 Puff(s) Inhalation two times a day  chlorhexidine 4% Liquid 1 Application(s) Topical <User Schedule>  levoFLOXacin IVPB 750 milliGRAM(s) IV Intermittent every 24 hours  methimazole 5 milliGRAM(s) Oral daily  methylPREDNISolone sodium succinate Injectable 60 milliGRAM(s) IV Push two times a day  pantoprazole    Tablet 40 milliGRAM(s) Oral before breakfast    PRN MEDICATIONS  ALBUTerol    90 MICROgram(s) HFA Inhaler 1 Puff(s) Inhalation every 4 hours PRN    VITAL SIGNS: Last 24 Hours  T(C): 37 (19 Dec 2018 13:20), Max: 37 (19 Dec 2018 13:20)  T(F): 98.6 (19 Dec 2018 13:20), Max: 98.6 (19 Dec 2018 13:20)  HR: 136 (19 Dec 2018 13:20) (108 - 136)  BP: 157/77 (19 Dec 2018 13:20) (95/52 - 157/77)  BP(mean): --  RR: 20 (19 Dec 2018 13:20) (20 - 92)  SpO2: 99% (19 Dec 2018 04:00) (98% - 99%)    PHYSICAL EXAM:    GENERAL: NAD, well-developed, AAOx3  HEENT:  Atraumatic, Normocephalic.   PULMONARY: + inspiratory and expiratory wheezing, not using accessory muscles  CARDIOVASCULAR: tachycardic, S1/S2  No murmurs, rubs, or gallops  GASTROINTESTINAL: Soft, Nontender, Nondistended; Bowel sounds present  MUSCULOSKELETAL:  2+ Peripheral Pulses, No edema  NEUROLOGY: non-focal, ambulating      LABS:                        10.4   11.04 )-----------( 324      ( 19 Dec 2018 08:47 )             32.6     12-19    140  |  101  |  7<L>  ----------------------------<  180<H>  4.4   |  19  |  0.9    Ca    9.8      19 Dec 2018 08:47  Mg     1.8     12-19    TPro  7.7  /  Alb  4.2  /  TBili  0.4  /  DBili  x   /  AST  20  /  ALT  22  /  AlkPhos  79  12-19        Lactate Trend  Lactate, Blood: 4.3 mmol/L <HH> (12-19-18 @ 08:47)  Blood Gas Venous - Lactate: 2.9 mmoL/L (12.18.18 @ 21:00)  Blood Gas Venous - Lactate: 3.1 mmoL/L (12.18.18 @ 16:20)      Creatine Kinase, Serum: 213 U/L (12-19-18 @ 08:47)  Troponin T, Serum: <0.01 ng/mL (12-19-18 @ 08:47)      CARDIAC MARKERS ( 19 Dec 2018 08:47 )  x     / <0.01 ng/mL / 213 U/L / x     / 5.2 ng/mL  CARDIAC MARKERS ( 18 Dec 2018 16:25 )  x     / <0.01 ng/mL / x     / x     / x        Rapid Respiratory Viral Panel (12.18.18 @ 23:36)    Rapid RVP Result: Detected: This Respiratory Panel uses polymerase chain reaction (PCR) to detect for  adenovirus; coronavirus (HKU1, NL63, 229E, OC43); human metapneumovirus  (hMPV); human enterovirus/rhinovirus (Entero/RV); influenza A; influenza  A/H1; influenza A/H3; influenza A/H1-2009; influenza B; parainfluenza  viruses 1, 2, 3, 4; respiratory syncytial virus; Mycoplasma pneumoniae;  and Chlamydophila pneumoniae.    Entero/Rhinovirus (RapRVP): Detected        RADIOLOGY:  < from: Xray Chest 1 View- PORTABLE-Routine (12.19.18 @ 07:21) >  IMPRESSION:      No radiographic evidence of acute cardiopulmonary disease.      < end of copied text >        ASSESSMENT & PLAN  49 yo F with hx of COPD (not on home O2) ,never intubated ,active smoker GERD ,Hyperthyroidism ,anemia presented to ER  with shortness of breath and cough x 3 days with whitish sputum along with wheezing ,which worsened over night .    # COPD exacerbation- still wheezing and has SOB  + wheezing on exam, measured SpO2 99% on room air  active smoker, not interested in smoking cessation at this time  cw solumedrol IV q 12 hours, will assess tomorrow if she can be switched to PO  cw symbicort inhaler  cw nebs q 6 hrs  RVP + enterovirus/rhinovirus  cw levaquin- will switch to PO (d1 today, plan to complete 5 day course total)    #Elevated Lactic Acid- ddx includes COPD exacerbation vs. dehydration  - 3.1 > 2.9 > 4.3 (12/19)  - start IV fluid hydration NS @ 75 cc/hr  - Repeat lactic acid q 4 hours, stop after 3 events     #Tachycardia  - no CP  - Likely due to side effect from nebulizer treatment     # Hyperthyroidism;  cw methimazole  TSH within normal range in Sept,18    #GERD; cw protonics     #DVT ppx ; with Lovenox   Regular diet   ambulate as tolerated  Full code    #Dispo; after medical stabilization          Outpatient Providers	PMD ; Dr Fabrizio SINGH; Dr Bojorquez	  Pharmacy: [facility] [phone]
49 yo F with hx of COPD (not on home O2), never intubated, active smoker, GERD, Hyperthyroidism, anemia presented to ER with shortness of breath and cough x 3 days with whitish sputum along with wheezing, which worsened over night.    # acute hypoxic resp failure 2/2 acute COPD exacerbation from chr active smoking  levaquin for total 1 wk and stop  Pred 60mg po q24 and taper off slowly over 10 days  Symbicort 2 puff q12  Duoneb inhaler for rescue only  outpt f/u w/ pulm Dr Bojorquez    # sinus congestion; rhinitis  Normal Saline nasal prn (to keep moist)  Flonase 1 sp c nostril q24 until better, up to 1-2 wks    # tobacco abuse  must d/c smoking  nicotine replacement as needed  pt referred to 311    # Hyperthyroidism;  c/w methimazole  TSH within normal range in Sept 2018  outpt f/u w/ PMD or Endo    # normo cytic anemia  outpt w/u w/ PMD    # GERD; c/w protonix     # DVT ppx: ambulates    # Dispo; ok to go home today

## 2018-12-20 NOTE — CHART NOTE - NSCHARTNOTEFT_GEN_A_CORE
<<<RESIDENT DISCHARGE NOTE>>>     ANDREW FISCHER  MRN-[id number]    VITAL SIGNS:  T(F): 97.4 (12-19-18 @ 20:00), Max: 98.2 (12-19-18 @ 19:47)  HR: 106 (12-19-18 @ 20:00)  BP: 155/83 (12-19-18 @ 20:00)  SpO2: 97% (12-19-18 @ 20:00)      PHYSICAL EXAMINATION:  GENERAL: NAD, well-developed, AAOx3  HEENT:  Atraumatic, Normocephalic.   PULMONARY: minimal expiratory wheezes b/l  CARDIOVASCULAR: tachycardic, S1/S2  No murmurs, rubs, or gallops  GASTROINTESTINAL: Soft, Nontender, Nondistended; Bowel sounds present  MUSCULOSKELETAL:  2+ Peripheral Pulses, No edema  NEUROLOGY: non-focal, ambulating      TEST RESULTS:                        10.1   12.03 )-----------( 344      ( 20 Dec 2018 07:10 )             31.9       12-20    142  |  100  |  9<L>  ----------------------------<  125<H>  4.1   |  24  |  0.8    Ca    9.3      20 Dec 2018 07:10  Mg     1.8     12-20    TPro  7.7  /  Alb  4.2  /  TBili  0.4  /  DBili  x   /  AST  20  /  ALT  22  /  AlkPhos  79  12-19      FINAL DISCHARGE INTERVIEW:  Resident(s) Present: (Name: Amna Stark MD RN Present: (Name: JAYLA Lynn    DISCHARGE MEDICATION RECONCILIATION  reviewed with Attending (Name: Dr. Yuan    DISPOSITION:   [ x ] Home,    [  ] Home with Visiting Nursing Services,   [    ]  SNF/ NH,    [   ] Acute Rehab (4A),   [   ] Other (Specify:_________)

## 2018-12-20 NOTE — PROGRESS NOTE ADULT - REASON FOR ADMISSION
worsening of difficulty breathing ,cough and wheezing since yesterday night
worsening of difficulty breathing ,cough and wheezing since yesterday night

## 2018-12-24 LAB
CULTURE RESULTS: SIGNIFICANT CHANGE UP
SPECIMEN SOURCE: SIGNIFICANT CHANGE UP

## 2018-12-26 DIAGNOSIS — E05.90 THYROTOXICOSIS, UNSPECIFIED WITHOUT THYROTOXIC CRISIS OR STORM: ICD-10-CM

## 2018-12-26 DIAGNOSIS — J44.1 CHRONIC OBSTRUCTIVE PULMONARY DISEASE WITH (ACUTE) EXACERBATION: ICD-10-CM

## 2018-12-26 DIAGNOSIS — F17.210 NICOTINE DEPENDENCE, CIGARETTES, UNCOMPLICATED: ICD-10-CM

## 2018-12-26 DIAGNOSIS — B97.89 OTHER VIRAL AGENTS AS THE CAUSE OF DISEASES CLASSIFIED ELSEWHERE: ICD-10-CM

## 2018-12-26 DIAGNOSIS — J96.01 ACUTE RESPIRATORY FAILURE WITH HYPOXIA: ICD-10-CM

## 2018-12-26 DIAGNOSIS — K21.9 GASTRO-ESOPHAGEAL REFLUX DISEASE WITHOUT ESOPHAGITIS: ICD-10-CM

## 2018-12-26 DIAGNOSIS — J00 ACUTE NASOPHARYNGITIS [COMMON COLD]: ICD-10-CM

## 2018-12-26 DIAGNOSIS — D64.9 ANEMIA, UNSPECIFIED: ICD-10-CM

## 2018-12-26 DIAGNOSIS — Z79.51 LONG TERM (CURRENT) USE OF INHALED STEROIDS: ICD-10-CM

## 2018-12-26 DIAGNOSIS — Z79.899 OTHER LONG TERM (CURRENT) DRUG THERAPY: ICD-10-CM

## 2019-01-01 NOTE — PHYSICAL EXAM
[No Acute Distress] : no acute distress [No JVD] : no jugular venous distention [No Respiratory Distress] : no respiratory distress  [Bradypnea] : bradypnea was noted [Dry Cough] : a dry cough was heard [Wheezing Bilaterally] : no wheezing was heard [No Edema] : there was no peripheral edema [Soft] : abdomen soft non-distended [No HSM] : no HSM [No Focal Deficits] : no focal deficits [Paroxysmal Cough] : no paroxysmal cough [Mouth Breathing] : no mouth breathing

## 2019-01-14 ENCOUNTER — APPOINTMENT (OUTPATIENT)
Dept: INTERNAL MEDICINE | Facility: CLINIC | Age: 49
End: 2019-01-14

## 2019-01-14 ENCOUNTER — OUTPATIENT (OUTPATIENT)
Dept: OUTPATIENT SERVICES | Facility: HOSPITAL | Age: 49
LOS: 1 days | Discharge: HOME | End: 2019-01-14

## 2019-01-14 VITALS
HEART RATE: 116 BPM | BODY MASS INDEX: 31.32 KG/M2 | DIASTOLIC BLOOD PRESSURE: 104 MMHG | TEMPERATURE: 97.4 F | WEIGHT: 188 LBS | SYSTOLIC BLOOD PRESSURE: 153 MMHG | HEIGHT: 65 IN

## 2019-01-14 DIAGNOSIS — K46.9 UNSPECIFIED ABDOMINAL HERNIA WITHOUT OBSTRUCTION OR GANGRENE: Chronic | ICD-10-CM

## 2019-01-14 NOTE — PHYSICAL EXAM
[No Acute Distress] : no acute distress [Well Nourished] : well nourished [Well Developed] : well developed [No Respiratory Distress] : no respiratory distress  [Clear to Auscultation] : lungs were clear to auscultation bilaterally [No Accessory Muscle Use] : no accessory muscle use [Normal Rate] : normal rate  [Regular Rhythm] : with a regular rhythm [Normal S1, S2] : normal S1 and S2 [Soft] : abdomen soft [Non Tender] : non-tender [Non-distended] : non-distended [No Joint Swelling] : no joint swelling

## 2019-01-14 NOTE — ASSESSMENT
[FreeTextEntry1] : 47 y/o old female pt with past medical hx of COPD/asthma on treatment, uterine bleeding in the past never follow up with gyn\par \par # COPD:\par - on copd regimen and follows up with \par - c/w inhalers\par - advised to quit smoking once more\par \par # Hyperthyroidism:\par - c/w methimazole stable f/u with endocrine\par \par HCM:\par - will need screening colo, anay, pap smear, was unable to attend testing and screening due to recent hospital admission but has follow up and scheduled appointments\par - flu given last visit\par - rto in 6months and prn\par - medications refilled to preferred pharmacy.

## 2019-01-14 NOTE — HISTORY OF PRESENT ILLNESS
[Other: _____] : [unfilled] [FreeTextEntry1] : 47 y/o old female pt with past medical hx of COPD/asthma on treatment, uterine bleeding in the past never follow up with gyn, hyperthyroidism on methimazole came for follow up after recent admission to the ER in Dec 2018.  Patient explains that she was admitted and treated for a COPD exacerbation.  She mentions that she has not had any symptoms compared to the ER admission and explains that she is here to have her medications refilled.  On the prior ER visit she explains she had medications increased which she would like to be updated in our clinic system.  No fevers, no chills, no cough, no abd pain, no chest pain reported by patient.

## 2019-01-15 DIAGNOSIS — E05.90 THYROTOXICOSIS, UNSPECIFIED WITHOUT THYROTOXIC CRISIS OR STORM: ICD-10-CM

## 2019-01-15 DIAGNOSIS — K21.9 GASTRO-ESOPHAGEAL REFLUX DISEASE WITHOUT ESOPHAGITIS: ICD-10-CM

## 2019-01-15 DIAGNOSIS — J44.9 CHRONIC OBSTRUCTIVE PULMONARY DISEASE, UNSPECIFIED: ICD-10-CM

## 2019-01-17 ENCOUNTER — TRANSCRIPTION ENCOUNTER (OUTPATIENT)
Age: 49
End: 2019-01-17

## 2019-01-25 ENCOUNTER — OUTPATIENT (OUTPATIENT)
Dept: OUTPATIENT SERVICES | Facility: HOSPITAL | Age: 49
LOS: 1 days | Discharge: HOME | End: 2019-01-25

## 2019-01-25 ENCOUNTER — APPOINTMENT (OUTPATIENT)
Dept: GASTROENTEROLOGY | Facility: CLINIC | Age: 49
End: 2019-01-25

## 2019-01-25 VITALS
DIASTOLIC BLOOD PRESSURE: 87 MMHG | HEART RATE: 69 BPM | TEMPERATURE: 96.1 F | BODY MASS INDEX: 31.32 KG/M2 | SYSTOLIC BLOOD PRESSURE: 147 MMHG | HEIGHT: 65 IN | WEIGHT: 188 LBS

## 2019-01-25 DIAGNOSIS — K46.9 UNSPECIFIED ABDOMINAL HERNIA WITHOUT OBSTRUCTION OR GANGRENE: Chronic | ICD-10-CM

## 2019-02-11 ENCOUNTER — APPOINTMENT (OUTPATIENT)
Dept: INTERNAL MEDICINE | Facility: CLINIC | Age: 49
End: 2019-02-11

## 2019-02-25 ENCOUNTER — OUTPATIENT (OUTPATIENT)
Dept: OUTPATIENT SERVICES | Facility: HOSPITAL | Age: 49
LOS: 1 days | Discharge: HOME | End: 2019-02-25

## 2019-02-25 ENCOUNTER — APPOINTMENT (OUTPATIENT)
Dept: OBGYN | Facility: CLINIC | Age: 49
End: 2019-02-25

## 2019-02-25 VITALS — DIASTOLIC BLOOD PRESSURE: 80 MMHG | SYSTOLIC BLOOD PRESSURE: 120 MMHG | BODY MASS INDEX: 30.79 KG/M2 | WEIGHT: 185 LBS

## 2019-02-25 DIAGNOSIS — K46.9 UNSPECIFIED ABDOMINAL HERNIA WITHOUT OBSTRUCTION OR GANGRENE: Chronic | ICD-10-CM

## 2019-02-25 LAB
BASOPHILS # BLD AUTO: 0.03 K/UL
BASOPHILS NFR BLD AUTO: 0.4 %
EOSINOPHIL # BLD AUTO: 0.1 K/UL
EOSINOPHIL NFR BLD AUTO: 1.4 %
HCT VFR BLD CALC: 35.1 %
HGB BLD-MCNC: 11.4 G/DL
IMM GRANULOCYTES NFR BLD AUTO: 0.1 %
LYMPHOCYTES # BLD AUTO: 2.14 K/UL
LYMPHOCYTES NFR BLD AUTO: 30.9 %
MAN DIFF?: NORMAL
MCHC RBC-ENTMCNC: 29.9 PG
MCHC RBC-ENTMCNC: 32.5 G/DL
MCV RBC AUTO: 92.1 FL
MONOCYTES # BLD AUTO: 0.52 K/UL
MONOCYTES NFR BLD AUTO: 7.5 %
NEUTROPHILS # BLD AUTO: 4.12 K/UL
NEUTROPHILS NFR BLD AUTO: 59.7 %
PLATELET # BLD AUTO: 247 K/UL
RBC # BLD: 3.81 M/UL
RBC # FLD: 16.2 %
WBC # FLD AUTO: 6.92 K/UL

## 2019-02-25 NOTE — PHYSICAL EXAM
[Awake] : awake [Alert] : alert [Acute Distress] : no acute distress [Mass] : no breast mass [Nipple Discharge] : no nipple discharge [Axillary LAD] : no axillary lymphadenopathy [Soft] : soft [Tender] : non tender [Oriented x3] : oriented to person, place, and time [Normal] : uterus [No Bleeding] : there was no active vaginal bleeding [Polyp ___ cm] : had a [unfilled] ~Ucm polyp [Uterine Adnexae] : were not tender and not enlarged

## 2019-02-26 LAB
CANCER AG125 SERPL-ACNC: 44 U/ML
CEA SERPL-MCNC: 2.9 NG/ML
ESTRADIOL SERPL-MCNC: 141 PG/ML
FSH SERPL-MCNC: 10.1 IU/L
PROLACTIN SERPL-MCNC: 15.6 NG/ML
TSH SERPL-ACNC: 3.18 UIU/ML

## 2019-02-27 DIAGNOSIS — N84.1 POLYP OF CERVIX UTERI: ICD-10-CM

## 2019-02-27 DIAGNOSIS — N92.0 EXCESSIVE AND FREQUENT MENSTRUATION WITH REGULAR CYCLE: ICD-10-CM

## 2019-03-11 LAB — HPV HIGH+LOW RISK DNA PNL CVX: NOT DETECTED

## 2019-03-25 ENCOUNTER — APPOINTMENT (OUTPATIENT)
Dept: OBGYN | Facility: CLINIC | Age: 49
End: 2019-03-25

## 2019-04-01 ENCOUNTER — TRANSCRIPTION ENCOUNTER (OUTPATIENT)
Age: 49
End: 2019-04-01

## 2019-04-08 NOTE — ED PROVIDER NOTE - DISCHARGE REVIEW MATERIAL PRESENTED
Pt informed. He just wanted a second opinion but he did put a msg in to his cardiology and is waiting for a response and he also has an apt with him next week.    .

## 2019-04-22 ENCOUNTER — TRANSCRIPTION ENCOUNTER (OUTPATIENT)
Age: 49
End: 2019-04-22

## 2019-05-03 ENCOUNTER — OUTPATIENT (OUTPATIENT)
Dept: OUTPATIENT SERVICES | Facility: HOSPITAL | Age: 49
LOS: 1 days | Discharge: HOME | End: 2019-05-03

## 2019-05-03 ENCOUNTER — APPOINTMENT (OUTPATIENT)
Dept: PULMONOLOGY | Facility: CLINIC | Age: 49
End: 2019-05-03

## 2019-05-03 VITALS
DIASTOLIC BLOOD PRESSURE: 85 MMHG | BODY MASS INDEX: 28.82 KG/M2 | HEART RATE: 95 BPM | SYSTOLIC BLOOD PRESSURE: 141 MMHG | HEIGHT: 65 IN | WEIGHT: 173 LBS

## 2019-05-03 DIAGNOSIS — K46.9 UNSPECIFIED ABDOMINAL HERNIA WITHOUT OBSTRUCTION OR GANGRENE: Chronic | ICD-10-CM

## 2019-05-03 NOTE — ASSESSMENT
[FreeTextEntry1] : 48 y.o female patient with PMH of COPD/asthma and Post nasal drip presents to the office for a follow-up visit since November 2018.\par \par COPD/Asthma\par - Ellipta Incruze and Advair and albuterol/ipratropium prn\par - Patient instructed that if symptoms worsen, to present to the ER\par - Recommend smoking cessation\par - PFTs\par \par Post nasal drip; resume Flonase as needed

## 2019-05-03 NOTE — HISTORY OF PRESENT ILLNESS
[FreeTextEntry1] : 48 y.o female patient with PMH of COPD/asthma and Post nasal drip presents to the office for a follow-up visit since November 2018. Patient was recently in the ED 2 weeks ago and 5 weeks ago for COPD exacerbation treated with a tapering course of prednisone. Patient reports that sometimes she wakes up at night feeling like she can't breath and takes the rescue inhaler. She reports cough and wheezing occasionally in the morning. Denies snoring, daytime sleepiness.\par Patient has been working for the past 7 months.

## 2019-05-03 NOTE — PHYSICAL EXAM
[No Acute Distress] : no acute distress [Well Nourished] : well nourished [Well-Appearing] : well-appearing [Well Developed] : well developed [PERRL] : pupils equal round and reactive to light [Normal Sclera/Conjunctiva] : normal sclera/conjunctiva [EOMI] : extraocular movements intact [Normal Oropharynx] : the oropharynx was normal [Normal Outer Ear/Nose] : the outer ears and nose were normal in appearance [No JVD] : no jugular venous distention [Supple] : supple [No Lymphadenopathy] : no lymphadenopathy [No Respiratory Distress] : no respiratory distress  [Clear to Auscultation] : lungs were clear to auscultation bilaterally [No Accessory Muscle Use] : no accessory muscle use [Normal Rate] : normal rate  [Normal S1, S2] : normal S1 and S2 [Regular Rhythm] : with a regular rhythm [No Carotid Bruits] : no carotid bruits [No Varicosities] : no varicosities [Pedal Pulses Present] : the pedal pulses are present [No Edema] : there was no peripheral edema [No Extremity Clubbing/Cyanosis] : no extremity clubbing/cyanosis [Soft] : abdomen soft [Non-distended] : non-distended [Non Tender] : non-tender [Normal Bowel Sounds] : normal bowel sounds [Normal Posterior Cervical Nodes] : no posterior cervical lymphadenopathy [Normal Anterior Cervical Nodes] : no anterior cervical lymphadenopathy [No Spinal Tenderness] : no spinal tenderness [No CVA Tenderness] : no CVA  tenderness [No Joint Swelling] : no joint swelling [No Rash] : no rash [Grossly Normal Strength/Tone] : grossly normal strength/tone [Normal Gait] : normal gait [No Focal Deficits] : no focal deficits [Coordination Grossly Intact] : coordination grossly intact [Deep Tendon Reflexes (DTR)] : deep tendon reflexes were 2+ and symmetric [Normal Insight/Judgement] : insight and judgment were intact [Normal Affect] : the affect was normal

## 2019-05-03 NOTE — REVIEW OF SYSTEMS
[Wheezing] : wheezing [Cough] : cough [Negative] : Heme/Lymph [Shortness Of Breath] : no shortness of breath [Dyspnea on Exertion] : no dyspnea on exertion

## 2019-06-10 ENCOUNTER — TRANSCRIPTION ENCOUNTER (OUTPATIENT)
Age: 49
End: 2019-06-10

## 2019-07-11 ENCOUNTER — TRANSCRIPTION ENCOUNTER (OUTPATIENT)
Age: 49
End: 2019-07-11

## 2019-07-15 ENCOUNTER — OUTPATIENT (OUTPATIENT)
Dept: OUTPATIENT SERVICES | Facility: HOSPITAL | Age: 49
LOS: 1 days | Discharge: HOME | End: 2019-07-15

## 2019-07-15 ENCOUNTER — APPOINTMENT (OUTPATIENT)
Dept: INTERNAL MEDICINE | Facility: CLINIC | Age: 49
End: 2019-07-15

## 2019-07-15 ENCOUNTER — LABORATORY RESULT (OUTPATIENT)
Age: 49
End: 2019-07-15

## 2019-07-15 VITALS
TEMPERATURE: 97.8 F | HEART RATE: 92 BPM | BODY MASS INDEX: 27.49 KG/M2 | WEIGHT: 165 LBS | DIASTOLIC BLOOD PRESSURE: 75 MMHG | SYSTOLIC BLOOD PRESSURE: 126 MMHG | HEIGHT: 65 IN

## 2019-07-15 DIAGNOSIS — K46.9 UNSPECIFIED ABDOMINAL HERNIA WITHOUT OBSTRUCTION OR GANGRENE: Chronic | ICD-10-CM

## 2019-07-15 RX ORDER — FLUTICASONE PROPIONATE AND SALMETEROL 50; 250 UG/1; UG/1
250-50 POWDER RESPIRATORY (INHALATION)
Qty: 1 | Refills: 5 | Status: DISCONTINUED | COMMUNITY
Start: 2018-07-25 | End: 2019-07-15

## 2019-07-15 RX ORDER — UMECLIDINIUM 62.5 UG/1
62.5 AEROSOL, POWDER ORAL DAILY
Qty: 5 | Refills: 5 | Status: DISCONTINUED | COMMUNITY
Start: 2019-01-14 | End: 2019-07-15

## 2019-07-15 RX ORDER — ESOMEPRAZOLE MAGNESIUM 40 MG/1
40 CAPSULE, DELAYED RELEASE ORAL DAILY
Qty: 30 | Refills: 5 | Status: DISCONTINUED | COMMUNITY
Start: 2019-04-01 | End: 2019-07-15

## 2019-07-15 RX ORDER — PANTOPRAZOLE 40 MG/1
40 TABLET, DELAYED RELEASE ORAL DAILY
Qty: 60 | Refills: 0 | Status: DISCONTINUED | COMMUNITY
Start: 2019-01-25 | End: 2019-07-15

## 2019-07-15 RX ORDER — PREDNISONE 20 MG/1
20 TABLET ORAL DAILY
Qty: 10 | Refills: 0 | Status: DISCONTINUED | COMMUNITY
Start: 2018-11-09 | End: 2019-07-15

## 2019-07-15 RX ORDER — NICOTINE 21 MG/24HR
14 PATCH, TRANSDERMAL 24 HOURS TRANSDERMAL DAILY
Qty: 7 | Refills: 2 | Status: DISCONTINUED | COMMUNITY
Start: 2017-01-30 | End: 2019-07-15

## 2019-07-15 RX ORDER — ASPIRIN 325 MG
600-400 TABLET, DELAYED RELEASE (ENTERIC COATED) ORAL
Qty: 60 | Refills: 5 | Status: DISCONTINUED | COMMUNITY
Start: 2017-09-01 | End: 2019-07-15

## 2019-07-15 NOTE — REVIEW OF SYSTEMS
[Shortness Of Breath] : shortness of breath [Negative] : Genitourinary [Wheezing] : no wheezing [Cough] : no cough [Dyspnea on Exertion] : no dyspnea on exertion

## 2019-07-15 NOTE — PHYSICAL EXAM
[Normal] : the outer ears and nose were normal in appearance and the oropharynx was normal [No Respiratory Distress] : no respiratory distress  [Clear to Auscultation] : lungs were clear to auscultation bilaterally [Normal Rate] : normal rate  [Regular Rhythm] : with a regular rhythm [Soft] : abdomen soft [Non Tender] : non-tender [Non-distended] : non-distended [Normal Bowel Sounds] : normal bowel sounds [No Joint Swelling] : no joint swelling [No Rash] : no rash

## 2019-07-15 NOTE — HISTORY OF PRESENT ILLNESS
[FreeTextEntry1] : follow up for COPD. Patient accompanied by self.  [de-identified] : 49 y/o old female pt with past medical hx of COPD/asthma on treatment, hyperthyroidism on methimazole came for follow up. Last hospital admission was for COPD in December 2018. Never intubated.\par \par Pt. complains of chest tightness due to the COPD, she went to an UC a month ago who prescribed prednisone which helped for two weeks. Pt. uses nebulizer treatment 2-3 times a day. No coughing when on meds. Reports issues obtaining incruze eliipta, needs approval, advir the generic "doesn't work" wants approval for advir. \par  No fevers, no chills, no cough, no abd pain, no chest pain on exertion reported by patient. \par Pt. doesn’t want a colonoscopy willing to try FIt test.\par

## 2019-07-15 NOTE — ASSESSMENT
[FreeTextEntry1] : \par  49 y/o old female pt with past medical hx of COPD/asthma on treatment, \par # COPD:\par - on copd regimen and follows up with \par -Pft referral ordered\par -based on insurance will switch to breo 100 and spiriva respimat 2.5\par - c/w nebulizers prn\par - advised to quit smoking once more\par \par # Hyperthyroidism:\par - c/w methimazole stable f/u with endocrine\par - chech tsh\par \par HCM:\par - will need screening colo- pt. refuses colonoscopy willing to try FIT test \par -up to date with gyn\par anay referal on next visit but has follow up and scheduled appointments\par - rto in 3 months and prn\par - medications refilled to preferred pharmacy. \par \par

## 2019-07-19 ENCOUNTER — APPOINTMENT (OUTPATIENT)
Dept: GASTROENTEROLOGY | Facility: CLINIC | Age: 49
End: 2019-07-19

## 2019-07-19 DIAGNOSIS — E05.90 THYROTOXICOSIS, UNSPECIFIED WITHOUT THYROTOXIC CRISIS OR STORM: ICD-10-CM

## 2019-07-19 DIAGNOSIS — G56.00 CARPAL TUNNEL SYNDROME, UNSPECIFIED UPPER LIMB: ICD-10-CM

## 2019-07-19 DIAGNOSIS — J44.9 CHRONIC OBSTRUCTIVE PULMONARY DISEASE, UNSPECIFIED: ICD-10-CM

## 2019-07-25 ENCOUNTER — TRANSCRIPTION ENCOUNTER (OUTPATIENT)
Age: 49
End: 2019-07-25

## 2019-07-31 LAB
ALBUMIN SERPL ELPH-MCNC: 4.1 G/DL
ALP BLD-CCNC: 52 U/L
ALT SERPL-CCNC: 12 U/L
ANION GAP SERPL CALC-SCNC: 15 MMOL/L
AST SERPL-CCNC: 12 U/L
BASOPHILS # BLD AUTO: 0.02 K/UL
BASOPHILS NFR BLD AUTO: 0.2 %
BILIRUB SERPL-MCNC: 0.3 MG/DL
BUN SERPL-MCNC: 12 MG/DL
CALCIUM SERPL-MCNC: 9.5 MG/DL
CHLORIDE SERPL-SCNC: 100 MMOL/L
CHOLEST SERPL-MCNC: 142 MG/DL
CHOLEST/HDLC SERPL: 1.4 RATIO
CO2 SERPL-SCNC: 24 MMOL/L
CREAT SERPL-MCNC: 0.9 MG/DL
EOSINOPHIL # BLD AUTO: 0 K/UL
EOSINOPHIL NFR BLD AUTO: 0 %
ESTIMATED AVERAGE GLUCOSE: 103 MG/DL
GLUCOSE SERPL-MCNC: 105 MG/DL
HBA1C MFR BLD HPLC: 5.2 %
HCT VFR BLD CALC: 29.9 %
HDLC SERPL-MCNC: 100 MG/DL
HGB BLD-MCNC: 9.1 G/DL
IMM GRANULOCYTES NFR BLD AUTO: 0.6 %
LDLC SERPL CALC-MCNC: 27 MG/DL
LYMPHOCYTES # BLD AUTO: 1.05 K/UL
LYMPHOCYTES NFR BLD AUTO: 10.1 %
MAN DIFF?: NORMAL
MCHC RBC-ENTMCNC: 26.5 PG
MCHC RBC-ENTMCNC: 30.4 G/DL
MCV RBC AUTO: 87.2 FL
MONOCYTES # BLD AUTO: 0.21 K/UL
MONOCYTES NFR BLD AUTO: 2 %
NEUTROPHILS # BLD AUTO: 9.08 K/UL
NEUTROPHILS NFR BLD AUTO: 87.1 %
PLATELET # BLD AUTO: 472 K/UL
POTASSIUM SERPL-SCNC: 4.3 MMOL/L
PROT SERPL-MCNC: 7.1 G/DL
RBC # BLD: 3.43 M/UL
RBC # FLD: 17.2 %
SODIUM SERPL-SCNC: 139 MMOL/L
TRIGL SERPL-MCNC: 114 MG/DL
TSH SERPL-ACNC: 0.51 UIU/ML
WBC # FLD AUTO: 10.42 K/UL

## 2019-08-01 ENCOUNTER — EMERGENCY (EMERGENCY)
Facility: HOSPITAL | Age: 49
LOS: 0 days | Discharge: HOME | End: 2019-08-01
Admitting: EMERGENCY MEDICINE
Payer: MEDICAID

## 2019-08-01 ENCOUNTER — OUTPATIENT (OUTPATIENT)
Dept: OUTPATIENT SERVICES | Facility: HOSPITAL | Age: 49
LOS: 1 days | Discharge: HOME | End: 2019-08-01

## 2019-08-01 VITALS
RESPIRATION RATE: 18 BRPM | TEMPERATURE: 98 F | HEART RATE: 102 BPM | DIASTOLIC BLOOD PRESSURE: 70 MMHG | SYSTOLIC BLOOD PRESSURE: 131 MMHG | OXYGEN SATURATION: 99 %

## 2019-08-01 VITALS — HEIGHT: 65 IN | WEIGHT: 164.91 LBS

## 2019-08-01 DIAGNOSIS — K46.9 UNSPECIFIED ABDOMINAL HERNIA WITHOUT OBSTRUCTION OR GANGRENE: Chronic | ICD-10-CM

## 2019-08-01 DIAGNOSIS — Z79.51 LONG TERM (CURRENT) USE OF INHALED STEROIDS: ICD-10-CM

## 2019-08-01 DIAGNOSIS — J44.9 CHRONIC OBSTRUCTIVE PULMONARY DISEASE, UNSPECIFIED: ICD-10-CM

## 2019-08-01 DIAGNOSIS — H57.10 OCULAR PAIN, UNSPECIFIED EYE: ICD-10-CM

## 2019-08-01 DIAGNOSIS — K21.9 GASTRO-ESOPHAGEAL REFLUX DISEASE WITHOUT ESOPHAGITIS: ICD-10-CM

## 2019-08-01 DIAGNOSIS — Z79.52 LONG TERM (CURRENT) USE OF SYSTEMIC STEROIDS: ICD-10-CM

## 2019-08-01 DIAGNOSIS — H10.9 UNSPECIFIED CONJUNCTIVITIS: ICD-10-CM

## 2019-08-01 DIAGNOSIS — Z79.899 OTHER LONG TERM (CURRENT) DRUG THERAPY: ICD-10-CM

## 2019-08-01 PROCEDURE — 99283 EMERGENCY DEPT VISIT LOW MDM: CPT

## 2019-08-01 RX ORDER — POLYMYXIN B SULF/TRIMETHOPRIM 10000-1/ML
1 DROPS OPHTHALMIC (EYE)
Qty: 1 | Refills: 0
Start: 2019-08-01 | End: 2019-08-07

## 2019-08-01 RX ORDER — IBUPROFEN 200 MG
1 TABLET ORAL
Qty: 60 | Refills: 0
Start: 2019-08-01 | End: 2019-08-20

## 2019-08-01 NOTE — ED PROVIDER NOTE - NS ED ROS FT
Review of Systems:  	•	CONSTITUTIONAL - no fever, no diaphoresis, no chills  	•	SKIN - no rash  	•	EYES - + right eye discharge and irritaiton  	•	ENT - no change in hearing, no sore throat, no ear pain or tinnitus  	•	RESPIRATORY - no shortness of breath, no cough  	•	CARDIAC - no chest pain, no palpitations  	•	GI - no abd pain, no nausea, no vomiting, no diarrhea, no constipation  	•	NEUROLOGIC - no weakness, no headache, no paresthesias, no LOC

## 2019-08-01 NOTE — ED PROVIDER NOTE - OBJECTIVE STATEMENT
48 year old female with pmhx of COPD, and hyperthyroid, presents with right eye irritation and discharge since yesterday. Pt admits woke up this morning, with eyelids closed shut with discharge. mild irritation and pain resolved with motrin. no trauma or injury, visual changes, pain with EOM, swelling, fever, nasal congestion, or ear pain. no HA or dizziness, + glasses, no contacts.

## 2019-08-01 NOTE — ED PROVIDER NOTE - NSFOLLOWUPCLINICS_GEN_ALL_ED_FT
[facility]  [facility]  [address], [address]  [place] [address]  Phone: [phone]  Fax:   Follow Up Time:

## 2019-08-01 NOTE — ED PROVIDER NOTE - CLINICAL SUMMARY MEDICAL DECISION MAKING FREE TEXT BOX
+ discharge and irritation to right eye, likely conjunctivitis. VA 20/20 corrected.. will follow up with eye clinic

## 2019-08-01 NOTE — ED PROVIDER NOTE - PHYSICAL EXAMINATION
CONST: Well appearing in NAD  EYES: + yellow and watery discharge to right eye,  PERRL, EOMI, Sclera and conjunctiva clear. Vision 20/20 corrected bilaterally . no corneal abrasion or luis carlos sign on flourstein stain  ENT: No nasal discharge. TM's clear B/L without drainage. Oropharynx normal appearing, no erythema or exudates. No abscess or swelling. Uvula midline. No temporal artery or mastoid tenderness.  NECK: Non-tender, no meningeal signs. normal ROM. supple   CARD: Normal S1 S2; Normal rate and rhythm  RESP: Equal BS B/L, No wheezes, rhonchi or rales. No distress  GI: Soft, non-tender, non-distended. no cva tenderness. normal BS  MS: Normal ROM in all extremities. pulses 2 +.   SKIN: Warm, dry, no acute rashes. Good turgor  NEURO: A&Ox3, No focal deficits. Strength 5/5 with no sensory deficits. Steady gait. Finger to nose intact. Negative pronator drift

## 2019-08-01 NOTE — ED ADULT NURSE NOTE - OBJECTIVE STATEMENT
Patient present to ED with complains of right eye swelling and pain x 2 days, denies other complains.

## 2019-08-11 ENCOUNTER — TRANSCRIPTION ENCOUNTER (OUTPATIENT)
Age: 49
End: 2019-08-11

## 2019-08-17 ENCOUNTER — EMERGENCY (EMERGENCY)
Facility: HOSPITAL | Age: 49
LOS: 0 days | Discharge: HOME | End: 2019-08-17
Attending: EMERGENCY MEDICINE | Admitting: EMERGENCY MEDICINE
Payer: MEDICAID

## 2019-08-17 VITALS — RESPIRATION RATE: 18 BRPM | TEMPERATURE: 98 F | OXYGEN SATURATION: 100 % | HEART RATE: 96 BPM

## 2019-08-17 VITALS
HEART RATE: 98 BPM | WEIGHT: 130.07 LBS | OXYGEN SATURATION: 100 % | DIASTOLIC BLOOD PRESSURE: 66 MMHG | RESPIRATION RATE: 22 BRPM | SYSTOLIC BLOOD PRESSURE: 129 MMHG

## 2019-08-17 DIAGNOSIS — J44.1 CHRONIC OBSTRUCTIVE PULMONARY DISEASE WITH (ACUTE) EXACERBATION: ICD-10-CM

## 2019-08-17 DIAGNOSIS — F17.200 NICOTINE DEPENDENCE, UNSPECIFIED, UNCOMPLICATED: ICD-10-CM

## 2019-08-17 DIAGNOSIS — R06.02 SHORTNESS OF BREATH: ICD-10-CM

## 2019-08-17 DIAGNOSIS — K46.9 UNSPECIFIED ABDOMINAL HERNIA WITHOUT OBSTRUCTION OR GANGRENE: Chronic | ICD-10-CM

## 2019-08-17 PROCEDURE — 99283 EMERGENCY DEPT VISIT LOW MDM: CPT

## 2019-08-17 PROCEDURE — 71046 X-RAY EXAM CHEST 2 VIEWS: CPT | Mod: 26

## 2019-08-17 NOTE — ED ADULT TRIAGE NOTE - CHIEF COMPLAINT QUOTE
Pt climbed 6 flights of stairs and had a COPD exacerbation; given nebulizer treatment by EMS, pt's O2 sat is 100% on ra in triage

## 2019-08-17 NOTE — ED ADULT NURSE NOTE - OBJECTIVE STATEMENT
Pt reports copd exacerbation. Walked up 6 flights of stairs, and became short of breathe. Was told she possibly might have pneumonia and requesting chest xray

## 2019-08-17 NOTE — ED PROVIDER NOTE - OBJECTIVE STATEMENT
49 yo hx of copd, sts power was out in her building, she walked up 6 flights, went to take her nightly neb but had no power, felt sob, called ems. given nebs, sx resolved. pt has no complaints at this time. no fever or cp. dry cough.

## 2019-08-19 ENCOUNTER — APPOINTMENT (OUTPATIENT)
Dept: OBGYN | Facility: CLINIC | Age: 49
End: 2019-08-19
Payer: MEDICAID

## 2019-08-19 ENCOUNTER — RESULT CHARGE (OUTPATIENT)
Age: 49
End: 2019-08-19

## 2019-08-19 ENCOUNTER — OUTPATIENT (OUTPATIENT)
Dept: OUTPATIENT SERVICES | Facility: HOSPITAL | Age: 49
LOS: 1 days | Discharge: HOME | End: 2019-08-19

## 2019-08-19 VITALS
WEIGHT: 165 LBS | BODY MASS INDEX: 27.49 KG/M2 | DIASTOLIC BLOOD PRESSURE: 72 MMHG | HEIGHT: 65 IN | SYSTOLIC BLOOD PRESSURE: 120 MMHG

## 2019-08-19 DIAGNOSIS — Z86.19 PERSONAL HISTORY OF OTHER INFECTIOUS AND PARASITIC DISEASES: ICD-10-CM

## 2019-08-19 DIAGNOSIS — K46.9 UNSPECIFIED ABDOMINAL HERNIA WITHOUT OBSTRUCTION OR GANGRENE: Chronic | ICD-10-CM

## 2019-08-19 LAB — HCG UR QL: NEGATIVE

## 2019-08-19 PROCEDURE — 99213 OFFICE O/P EST LOW 20 MIN: CPT

## 2019-08-19 NOTE — PHYSICAL EXAM
[Awake] : awake [Alert] : alert [Acute Distress] : no acute distress [Soft] : soft [Tender] : non tender [H/Smegaly] : no hepatosplenomegaly [Distended] : not distended [Normal] : uterus [No Bleeding] : there was no active vaginal bleeding [Uterine Adnexae] : were not tender and not enlarged

## 2019-08-20 ENCOUNTER — OUTPATIENT (OUTPATIENT)
Dept: OUTPATIENT SERVICES | Facility: HOSPITAL | Age: 49
LOS: 1 days | Discharge: HOME | End: 2019-08-20
Payer: MEDICAID

## 2019-08-20 DIAGNOSIS — N84.0 POLYP OF CORPUS UTERI: ICD-10-CM

## 2019-08-20 DIAGNOSIS — K46.9 UNSPECIFIED ABDOMINAL HERNIA WITHOUT OBSTRUCTION OR GANGRENE: Chronic | ICD-10-CM

## 2019-08-20 PROCEDURE — 92020 GONIOSCOPY: CPT

## 2019-08-21 ENCOUNTER — OUTPATIENT (OUTPATIENT)
Dept: OUTPATIENT SERVICES | Facility: HOSPITAL | Age: 49
LOS: 1 days | Discharge: HOME | End: 2019-08-21

## 2019-08-21 DIAGNOSIS — G56.03 CARPAL TUNNEL SYNDROME, BILATERAL UPPER LIMBS: ICD-10-CM

## 2019-08-21 DIAGNOSIS — K46.9 UNSPECIFIED ABDOMINAL HERNIA WITHOUT OBSTRUCTION OR GANGRENE: Chronic | ICD-10-CM

## 2019-09-15 ENCOUNTER — TRANSCRIPTION ENCOUNTER (OUTPATIENT)
Age: 49
End: 2019-09-15

## 2019-10-12 ENCOUNTER — TRANSCRIPTION ENCOUNTER (OUTPATIENT)
Age: 49
End: 2019-10-12

## 2019-10-15 ENCOUNTER — CHART COPY (OUTPATIENT)
Age: 49
End: 2019-10-15

## 2019-10-26 ENCOUNTER — EMERGENCY (EMERGENCY)
Facility: HOSPITAL | Age: 49
LOS: 0 days | Discharge: HOME | End: 2019-10-26
Attending: EMERGENCY MEDICINE | Admitting: EMERGENCY MEDICINE
Payer: MEDICAID

## 2019-10-26 VITALS
DIASTOLIC BLOOD PRESSURE: 64 MMHG | TEMPERATURE: 98 F | RESPIRATION RATE: 22 BRPM | HEART RATE: 116 BPM | OXYGEN SATURATION: 100 % | SYSTOLIC BLOOD PRESSURE: 130 MMHG

## 2019-10-26 DIAGNOSIS — F17.200 NICOTINE DEPENDENCE, UNSPECIFIED, UNCOMPLICATED: ICD-10-CM

## 2019-10-26 DIAGNOSIS — R06.02 SHORTNESS OF BREATH: ICD-10-CM

## 2019-10-26 DIAGNOSIS — K46.9 UNSPECIFIED ABDOMINAL HERNIA WITHOUT OBSTRUCTION OR GANGRENE: Chronic | ICD-10-CM

## 2019-10-26 DIAGNOSIS — J44.1 CHRONIC OBSTRUCTIVE PULMONARY DISEASE WITH (ACUTE) EXACERBATION: ICD-10-CM

## 2019-10-26 DIAGNOSIS — R07.89 OTHER CHEST PAIN: ICD-10-CM

## 2019-10-26 PROCEDURE — 99284 EMERGENCY DEPT VISIT MOD MDM: CPT

## 2019-10-26 RX ORDER — PANTOPRAZOLE SODIUM 20 MG/1
1 TABLET, DELAYED RELEASE ORAL
Qty: 0 | Refills: 0 | DISCHARGE

## 2019-10-26 RX ADMIN — Medication 60 MILLIGRAM(S): at 11:51

## 2019-10-26 NOTE — ED ADULT NURSE REASSESSMENT NOTE - NS ED NURSE REASSESS COMMENT FT1
pt arrived to ED, awake, alert, no distress noted. Pt announced she is going to get something to eat prior to eval.

## 2019-10-26 NOTE — ED PROVIDER NOTE - NS ED ROS FT
Constitutional:  See HPI  Eyes:  No visual changes  ENMT: No neck pain or stiffness  Cardiac:  No chest pain  Respiratory:  See HPI   GI:  No nausea, vomiting, diarrhea or abdominal pain.  :  No dysuria, frequency or burning.  MS:  No back pain.  Neuro:  No headache   Skin:  No skin rash  Except as documented in the HPI,  all other systems are negative

## 2019-10-26 NOTE — ED PROVIDER NOTE - PROGRESS NOTE DETAILS
Pt feels well, does not want to stay for CXR, no fever, chills, cough, feels sx occurred because she didn't take her usual neb this morning. Porter carrillo.

## 2019-10-26 NOTE — ED PROVIDER NOTE - PATIENT PORTAL LINK FT
You can access the FollowMyHealth Patient Portal offered by [facility] by registering at the following website: [email or web address]. By joining [employer]’s FollowMyHealth portal, you will also be able to view your health information using other applications (apps) compatible with our system.

## 2019-10-26 NOTE — ED PROVIDER NOTE - CLINICAL SUMMARY MEDICAL DECISION MAKING FREE TEXT BOX
Pt was observed in theED and sx did not recur. As she felt well, we agreed that CXR was unnecessary given the lack of URI sx. Pt feels exacerbation is just due to missing her normal morning neb. Feels comfortable with discharge and has complete neb set at home.

## 2019-10-26 NOTE — ED ADULT NURSE NOTE - NSIMPLEMENTINTERV_GEN_ALL_ED
Implemented All Universal Safety Interventions:  [place] to call system. Call bell, personal items and telephone within reach. Instruct patient to call for assistance. Room bathroom lighting operational. Non-slip footwear when patient is off stretcher. Physically safe environment: no spills, clutter or unnecessary equipment. Stretcher in lowest position, wheels locked, appropriate side rails in place.

## 2019-10-26 NOTE — ED PROVIDER NOTE - OBJECTIVE STATEMENT
50yo woman h/o GERD, anemia, COPD BIBA from work c/o SOB. Pt normally takes advair in the mornings, followed by an albuterol nebulizer. She has a nebulizer at work, but realized that a piece was missing and could not do her usual neb. She then began to feel chest tightness and SOB typical of her COPD exacerbations, and 911 was called. EMS gave neb and sx resolved on the way to the ED. Pt denies URI sx, sore throat, nausea, vomiting, diarrhea, lashae chest pain/back pain, pleuritic pain.

## 2019-10-28 ENCOUNTER — APPOINTMENT (OUTPATIENT)
Dept: INTERNAL MEDICINE | Facility: CLINIC | Age: 49
End: 2019-10-28

## 2019-11-01 ENCOUNTER — APPOINTMENT (OUTPATIENT)
Dept: INTERNAL MEDICINE | Facility: CLINIC | Age: 49
End: 2019-11-01

## 2019-11-13 ENCOUNTER — TRANSCRIPTION ENCOUNTER (OUTPATIENT)
Age: 49
End: 2019-11-13

## 2019-11-28 ENCOUNTER — TRANSCRIPTION ENCOUNTER (OUTPATIENT)
Age: 49
End: 2019-11-28

## 2020-01-17 ENCOUNTER — TRANSCRIPTION ENCOUNTER (OUTPATIENT)
Age: 50
End: 2020-01-17

## 2020-01-23 ENCOUNTER — EMERGENCY (EMERGENCY)
Facility: HOSPITAL | Age: 50
LOS: 0 days | Discharge: HOME | End: 2020-01-23
Attending: EMERGENCY MEDICINE | Admitting: EMERGENCY MEDICINE
Payer: MEDICAID

## 2020-01-23 VITALS
OXYGEN SATURATION: 100 % | RESPIRATION RATE: 20 BRPM | WEIGHT: 141.98 LBS | TEMPERATURE: 98 F | SYSTOLIC BLOOD PRESSURE: 147 MMHG | DIASTOLIC BLOOD PRESSURE: 79 MMHG | HEART RATE: 90 BPM

## 2020-01-23 DIAGNOSIS — J44.9 CHRONIC OBSTRUCTIVE PULMONARY DISEASE, UNSPECIFIED: ICD-10-CM

## 2020-01-23 DIAGNOSIS — R07.9 CHEST PAIN, UNSPECIFIED: ICD-10-CM

## 2020-01-23 DIAGNOSIS — Z87.891 PERSONAL HISTORY OF NICOTINE DEPENDENCE: ICD-10-CM

## 2020-01-23 DIAGNOSIS — K46.9 UNSPECIFIED ABDOMINAL HERNIA WITHOUT OBSTRUCTION OR GANGRENE: Chronic | ICD-10-CM

## 2020-01-23 DIAGNOSIS — Z79.52 LONG TERM (CURRENT) USE OF SYSTEMIC STEROIDS: ICD-10-CM

## 2020-01-23 PROCEDURE — 93010 ELECTROCARDIOGRAM REPORT: CPT

## 2020-01-23 PROCEDURE — 99284 EMERGENCY DEPT VISIT MOD MDM: CPT

## 2020-01-23 PROCEDURE — 71046 X-RAY EXAM CHEST 2 VIEWS: CPT | Mod: 26

## 2020-01-23 RX ORDER — METHIMAZOLE 10 MG/1
1 TABLET ORAL
Qty: 7 | Refills: 0
Start: 2020-01-23 | End: 2020-01-29

## 2020-01-23 RX ORDER — FLUTICASONE PROPIONATE AND SALMETEROL 50; 250 UG/1; UG/1
1 POWDER ORAL; RESPIRATORY (INHALATION)
Qty: 1 | Refills: 0
Start: 2020-01-23 | End: 2020-01-29

## 2020-01-23 RX ORDER — ALBUTEROL 90 UG/1
1 AEROSOL, METERED ORAL
Qty: 1 | Refills: 0
Start: 2020-01-23 | End: 2020-01-25

## 2020-01-23 RX ORDER — FLUTICASONE PROPIONATE 50 MCG
1 SPRAY, SUSPENSION NASAL
Qty: 1 | Refills: 0
Start: 2020-01-23 | End: 2020-01-29

## 2020-01-23 RX ORDER — FLUTICASONE PROPIONATE AND SALMETEROL 50; 250 UG/1; UG/1
1 POWDER ORAL; RESPIRATORY (INHALATION)
Qty: 0 | Refills: 0 | DISCHARGE

## 2020-01-23 RX ORDER — MONTELUKAST 4 MG/1
1 TABLET, CHEWABLE ORAL
Qty: 7 | Refills: 0
Start: 2020-01-23 | End: 2020-01-29

## 2020-01-23 RX ORDER — PANTOPRAZOLE SODIUM 20 MG/1
1 TABLET, DELAYED RELEASE ORAL
Qty: 7 | Refills: 0
Start: 2020-01-23 | End: 2020-01-29

## 2020-01-23 RX ADMIN — Medication 60 MILLIGRAM(S): at 17:17

## 2020-01-23 NOTE — ED PROVIDER NOTE - PATIENT PORTAL LINK FT
You can access the FollowMyHealth Patient Portal offered by [facility] by registering at the following website: [email or web address]. By joining [employer]’s FollowMyHealth portal, you will also be able to view your health information using other applications (apps) compatible with our system.

## 2020-01-23 NOTE — ED PROVIDER NOTE - CLINICAL SUMMARY MEDICAL DECISION MAKING FREE TEXT BOX
Pt with HX of COPD, feels that her COPD is starting to flare and is requesting steroids. Pt only uses nebulizer and respiratory inhaler 2x/ day and was encouraged to use 4-6x/day dependent on state of SX. Was given return precautions and follow up with DR. Bojorquez.

## 2020-01-23 NOTE — ED PROVIDER NOTE - NSFOLLOWUPINSTRUCTIONS_ED_ALL_ED_FT
Chronic Obstructive Pulmonary Disease  Chronic obstructive pulmonary disease (COPD) is a long-term (chronic) condition that affects the lungs. COPD is a general term that can be used to describe many different lung problems that cause lung swelling (inflammation) and limit airflow, including chronic bronchitis and emphysema. If you have COPD, your lung function will probably never return to normal. In most cases, it gets worse over time. However, there are steps you can take to slow the progression of the disease and improve your quality of life.    What are the causes?  This condition may be caused by:    Smoking. This is the most common cause.  Certain genes passed down through families.    What increases the risk?  The following factors may make you more likely to develop this condition:    Secondhand smoke from cigarettes, pipes, or cigars.  Exposure to chemicals and other irritants such as fumes and dust in the work environment.  Chronic lung conditions or infections.    What are the signs or symptoms?  Symptoms of this condition include:    Shortness of breath, especially during physical activity.  Chronic cough with a large amount of thick mucus. Sometimes the cough may not have any mucus (dry cough).  Wheezing.  Rapid breaths.  Gray or bluish discoloration (cyanosis) of the skin, especially in your fingers, toes, or lips.  Feeling tired (fatigue).  Weight loss.  Chest tightness.  Frequent infections.  Episodes when breathing symptoms become much worse (exacerbations).  Swelling in the ankles, feet, or legs. This may occur in later stages of the disease.    How is this diagnosed?  This condition is diagnosed based on:    Your medical history.  A physical exam.    You may also have tests, including:    Lung (pulmonary) function tests. This may include a spirometry test, which measures your ability to exhale properly.  Chest X-ray.  CT scan.  Blood tests.    How is this treated?  This condition may be treated with:    Medicines. These may include inhaled rescue medicines to treat acute exacerbations as well as long-term, or maintenance, medicines to prevent flare-ups of COPD.    Bronchodilators help treat COPD by dilating the airways to allow increased airflow and make your breathing more comfortable.  Steroids can reduce airway inflammation and help prevent exacerbations.    Smoking cessation. If you smoke, your health care provider may ask you to quit, and may also recommend therapy or replacement products to help you quit.  Pulmonary rehabilitation. This may involve working with a team of health care providers and specialists, such as respiratory, occupational, and physical therapists.  Exercise and physical activity. These are beneficial for nearly all people with COPD.  Nutrition therapy to gain weight, if you are underweight.  Oxygen. Supplemental oxygen therapy is only helpful if you have a low oxygen level in your blood (hypoxemia).  Lung surgery or transplant.  Palliative care. This is to help people with COPD feel comfortable when treatment is no longer working.    Follow these instructions at home:  Medicines     Take over-the-counter and prescription medicines (inhaled or pills) only as told by your health care provider.  Talk to your health care provider before taking any cough or allergy medicines. You may need to avoid certain medicines that dry out your airways.  Lifestyle     If you are a smoker, the most important thing that you can do is to stop smoking. Do not use any products that contain nicotine or tobacco, such as cigarettes and e-cigarettes. If you need help quitting, ask your health care provider. Continuing to smoke will cause the disease to progress faster.  Avoid exposure to things that irritate your lungs, such as smoke, chemicals, and fumes.  Stay active, but balance activity with periods of rest. Exercise and physical activity will help you maintain your ability to do things you want to do.  Learn and use relaxation techniques to manage stress and to control your breathing.  Get the right amount of sleep and get quality sleep. Most adults need 7 or more hours per night.  Eat healthy foods. Eating smaller, more frequent meals and resting before meals may help you maintain your strength.  Controlled breathing     Learn and use controlled breathing techniques as directed by your health care provider. Controlled breathing techniques include:    Pursed lip breathing. Start by breathing in (inhaling) through your nose for 1 second. Then, purse your lips as if you were going to whistle and breathe out (exhale) through the pursed lips for 2 seconds.  Diaphragmatic breathing. Start by putting one hand on your abdomen just above your waist. Inhale slowly through your nose. The hand on your abdomen should move out. Then purse your lips and exhale slowly. You should be able to feel the hand on your abdomen moving in as you exhale.    Controlled coughing     Learn and use controlled coughing to clear mucus from your lungs. Controlled coughing is a series of short, progressive coughs. The steps of controlled coughing are:    Lean your head slightly forward.    Breathe in deeply using diaphragmatic breathing.    Try to hold your breath for 3 seconds.    Keep your mouth slightly open while coughing twice.    Spit any mucus out into a tissue.    Rest and repeat the steps once or twice as needed.      General instructions     Make sure you receive all the vaccines that your health care provider recommends, especially the pneumococcal and influenza vaccines. Preventing infection and hospitalization is very important when you have COPD.  Use oxygen therapy and pulmonary rehabilitation if directed to by your health care provider. If you require home oxygen therapy, ask your health care provider whether you should purchase a pulse oximeter to measure your oxygen level at home.  Work with your health care provider to develop a COPD action plan. This will help you know what steps to take if your condition gets worse.  Keep other chronic health conditions under control as told by your health care provider.  Avoid extreme temperature and humidity changes.  Avoid contact with people who have an illness that spreads from person to person (is contagious), such as viral infections or pneumonia.  Keep all follow-up visits as told by your health care provider. This is important.  Contact a health care provider if:  You are coughing up more mucus than usual.  There is a change in the color or thickness of your mucus.  Your breathing is more labored than usual.  Your breathing is faster than usual.  You have difficulty sleeping.  You need to use your rescue medicines or inhalers more often than expected.  You have trouble doing routine activities such as getting dressed or walking around the house.  Get help right away if:  You have shortness of breath while you are resting.  You have shortness of breath that prevents you from:    Being able to talk.  Performing your usual physical activities.    You have chest pain lasting longer than 5 minutes.  Your skin color is more blue (cyanotic) than usual.  You measure low oxygen saturations for longer than 5 minutes with a pulse oximeter.  You have a fever.  You feel too tired to breathe normally.  Summary  Chronic obstructive pulmonary disease (COPD) is a long-term (chronic) condition that affects the lungs.  Your lung function will probably never return to normal. In most cases, it gets worse over time. However, there are steps you can take to slow the progression of the disease and improve your quality of life.  Treatment for COPD may include taking medicines, quitting smoking, pulmonary rehabilitation, and changes to diet and exercise. As the disease progresses, you may need oxygen therapy, a lung transplant, or palliative care.  To help manage your condition, do not smoke, avoid exposure to things that irritate your lungs, stay up to date on all vaccines, and follow your health care provider's instructions for taking medicines.  This information is not intended to replace advice given to you by your health care provider. Make sure you discuss any questions you have with your health care provider.

## 2020-01-23 NOTE — ED ADULT TRIAGE NOTE - CHIEF COMPLAINT QUOTE
PT with hx of copd having chest tightness for 2 days, requesting prednisione. Pt's son is peds patient

## 2020-01-23 NOTE — ED PROVIDER NOTE - NS ED ROS FT
Constitutional: no fever, chills, no recent weight loss, change in appetite or malaise  Eyes: no redness/discharge/pain/vision changes  ENT: no rhinorrhea/ear pain/sore throat  Cardiac: No edema.  Respiratory: No cough or respiratory distress  GI: No nausea, vomiting, diarrhea or abdominal pain.  : No dysuria, frequency, urgency or hematuria  MS: no pain to back or extremities, no loss of ROM, no weakness  Neuro: No headache or weakness. No LOC.  Skin: No skin rash.  Except as documented in the HPI, all other systems are negative.

## 2020-01-23 NOTE — ED PROVIDER NOTE - CARE PROVIDER_API CALL
Vin Bojorquez)  Critical Care Medicine; Geriatric Medicine; Internal Medicine; Pulmonary Disease  [address] [place] [address]  Phone: [phone]  Fax: [phone]  Follow Up Time:

## 2020-01-23 NOTE — ED PROVIDER NOTE - PROGRESS NOTE DETAILS
pt has good aeration and is speaking in full sentences without distress.  r/b of steroids considered and dw pt.   pt aware to seek med attention if any worse.  pt will use bronchodilator q 4 hours and return to ER if needs more frequent. ATTENDING NOTE: I personally evaluated the patient. I reviewed the Physician Assistant’s note (as assigned above), and agree with the findings and plan except as documented in my note.   48 y/o F with HX of COPD not on home O2, usual sees Dr. Bojorquez from home but has not seen in a him few months,  now p/w quest for Prednisone. Pt has been having mild SOB and chest tightness which is typical of her COPD exacerbation. Pt additionally notes that she started having URI SX a few days ago. (+) sick contacts at home, no fever. Denies CP, leg swelling or pain.   NAD. NCAT. PERRL, EOMI. MMM. RRR. CTAB. Abdomen soft, NT/ND.   Impression: Pt with HX of COPD, feels that her COPD is starting to flare and is requesting steroids. Pt only uses nebulizer and respiratory inhaler 2x/ day and was encouraged to use 4-6x/day dependent on state of SX. Was given return precautions and follow up with DR. Bojorquez.

## 2020-01-23 NOTE — ED PROVIDER NOTE - OBJECTIVE STATEMENT
pt in ED with son for abdominal pain and sts she realized she ran out of COPD meds, requesting refill and dose of prednisone now as she "feels her COPD is starting up". in triage reported chest tightness but sts this is her nl COPD/asthma exac sxs. Sees  but does not have upcoming appt. Denies fever/chill/HA/dizziness/palpitation/abd pain/n/v/d/ black stool/bloody stool/urinary sxs

## 2020-01-25 ENCOUNTER — TRANSCRIPTION ENCOUNTER (OUTPATIENT)
Age: 50
End: 2020-01-25

## 2020-02-03 ENCOUNTER — TRANSCRIPTION ENCOUNTER (OUTPATIENT)
Age: 50
End: 2020-02-03

## 2020-02-18 ENCOUNTER — TRANSCRIPTION ENCOUNTER (OUTPATIENT)
Age: 50
End: 2020-02-18

## 2020-03-07 ENCOUNTER — TRANSCRIPTION ENCOUNTER (OUTPATIENT)
Age: 50
End: 2020-03-07

## 2020-04-22 ENCOUNTER — TRANSCRIPTION ENCOUNTER (OUTPATIENT)
Age: 50
End: 2020-04-22

## 2020-05-06 ENCOUNTER — TRANSCRIPTION ENCOUNTER (OUTPATIENT)
Age: 50
End: 2020-05-06

## 2020-05-12 ENCOUNTER — OUTPATIENT (OUTPATIENT)
Dept: OUTPATIENT SERVICES | Facility: HOSPITAL | Age: 50
LOS: 1 days | Discharge: HOME | End: 2020-05-12

## 2020-05-12 ENCOUNTER — APPOINTMENT (OUTPATIENT)
Dept: INTERNAL MEDICINE | Facility: CLINIC | Age: 50
End: 2020-05-12
Payer: MEDICAID

## 2020-05-12 VITALS
BODY MASS INDEX: 21.66 KG/M2 | DIASTOLIC BLOOD PRESSURE: 75 MMHG | HEIGHT: 65 IN | HEART RATE: 101 BPM | TEMPERATURE: 97.5 F | WEIGHT: 130 LBS | SYSTOLIC BLOOD PRESSURE: 133 MMHG

## 2020-05-12 DIAGNOSIS — Z82.49 FAMILY HISTORY OF ISCHEMIC HEART DISEASE AND OTHER DISEASES OF THE CIRCULATORY SYSTEM: ICD-10-CM

## 2020-05-12 DIAGNOSIS — K46.9 UNSPECIFIED ABDOMINAL HERNIA WITHOUT OBSTRUCTION OR GANGRENE: Chronic | ICD-10-CM

## 2020-05-12 DIAGNOSIS — F17.200 NICOTINE DEPENDENCE, UNSPECIFIED, UNCOMPLICATED: ICD-10-CM

## 2020-05-12 DIAGNOSIS — Z82.5 FAMILY HISTORY OF ASTHMA AND OTHER CHRONIC LOWER RESPIRATORY DISEASES: ICD-10-CM

## 2020-05-12 DIAGNOSIS — Z78.9 OTHER SPECIFIED HEALTH STATUS: ICD-10-CM

## 2020-05-12 PROCEDURE — 99214 OFFICE O/P EST MOD 30 MIN: CPT | Mod: GC

## 2020-05-12 RX ORDER — PANTOPRAZOLE SODIUM 40 MG/1
40 GRANULE, DELAYED RELEASE ORAL DAILY
Qty: 1 | Refills: 3 | Status: DISCONTINUED | COMMUNITY
Start: 2019-05-31 | End: 2020-05-12

## 2020-05-12 RX ORDER — TIOTROPIUM BROMIDE INHALATION SPRAY 3.12 UG/1
2.5 SPRAY, METERED RESPIRATORY (INHALATION) DAILY
Qty: 1 | Refills: 5 | Status: DISCONTINUED | COMMUNITY
Start: 2019-05-17 | End: 2020-05-12

## 2020-05-12 RX ORDER — ALBUTEROL SULFATE 108 UG/1
108 (90 BASE) AEROSOL, METERED RESPIRATORY (INHALATION)
Qty: 1 | Refills: 5 | Status: COMPLETED | COMMUNITY
Start: 2019-01-14 | End: 2020-05-12

## 2020-05-12 RX ORDER — FLUTICASONE FUROATE AND VILANTEROL TRIFENATATE 100; 25 UG/1; UG/1
100-25 POWDER RESPIRATORY (INHALATION)
Qty: 1 | Refills: 5 | Status: DISCONTINUED | COMMUNITY
Start: 2019-07-15 | End: 2020-05-12

## 2020-05-12 RX ORDER — TERCONAZOLE 4 MG/G
0.4 CREAM VAGINAL
Qty: 7 | Refills: 6 | Status: DISCONTINUED | COMMUNITY
Start: 2019-08-19 | End: 2020-05-12

## 2020-05-12 NOTE — PHYSICAL EXAM
[Normal] : normal rate, regular rhythm, normal S1 and S2 and no murmur heard [de-identified] : expiratory wheezes [de-identified] : recurrent umbilical hernia

## 2020-05-12 NOTE — ASSESSMENT
[FreeTextEntry1] : 49 yr oldfemale with h/o\par 1.copd minimal wheezing c/w current rx start [id number] check cxr\par 2.wt loss check tsh\par gerd stabke c/w ppi\par rto [id number] and prn

## 2020-05-12 NOTE — HISTORY OF PRESENT ILLNESS
[FreeTextEntry1] : [id number] no fever lost15 lbs last month  [de-identified] : 49 yrold female with pmh/o\par 1.copd \par 2.hyperthyroid\par 3.gerd \par 4.recurrent umbilical hernia refer to surgery

## 2020-05-12 NOTE — REVIEW OF SYSTEMS
[Shortness Of Breath] : shortness of breath [Wheezing] : wheezing [Negative] : Gastrointestinal [FreeTextEntry2] : wt loss

## 2020-05-21 RX ORDER — TIOTROPIUM BROMIDE 18 UG/1
18 CAPSULE ORAL; RESPIRATORY (INHALATION) DAILY
Qty: 20 | Refills: 0 | Status: DISCONTINUED | COMMUNITY
Start: 2020-05-21 | End: 2020-05-21

## 2020-05-27 ENCOUNTER — OUTPATIENT (OUTPATIENT)
Dept: OUTPATIENT SERVICES | Facility: HOSPITAL | Age: 50
LOS: 1 days | Discharge: HOME | End: 2020-05-27
Payer: MEDICAID

## 2020-05-27 DIAGNOSIS — Z82.49 FAMILY HISTORY OF ISCHEMIC HEART DISEASE AND OTHER DISEASES OF THE CIRCULATORY SYSTEM: ICD-10-CM

## 2020-05-27 DIAGNOSIS — E05.90 THYROTOXICOSIS, UNSPECIFIED WITHOUT THYROTOXIC CRISIS OR STORM: ICD-10-CM

## 2020-05-27 DIAGNOSIS — F17.200 NICOTINE DEPENDENCE, UNSPECIFIED, UNCOMPLICATED: ICD-10-CM

## 2020-05-27 DIAGNOSIS — K46.9 UNSPECIFIED ABDOMINAL HERNIA WITHOUT OBSTRUCTION OR GANGRENE: Chronic | ICD-10-CM

## 2020-05-27 DIAGNOSIS — K21.9 GASTRO-ESOPHAGEAL REFLUX DISEASE WITHOUT ESOPHAGITIS: ICD-10-CM

## 2020-05-27 DIAGNOSIS — Z82.5 FAMILY HISTORY OF ASTHMA AND OTHER CHRONIC LOWER RESPIRATORY DISEASES: ICD-10-CM

## 2020-05-27 DIAGNOSIS — Z78.9 OTHER SPECIFIED HEALTH STATUS: ICD-10-CM

## 2020-05-27 DIAGNOSIS — J44.9 CHRONIC OBSTRUCTIVE PULMONARY DISEASE, UNSPECIFIED: ICD-10-CM

## 2020-05-27 PROCEDURE — 71046 X-RAY EXAM CHEST 2 VIEWS: CPT | Mod: 26

## 2020-06-04 ENCOUNTER — TRANSCRIPTION ENCOUNTER (OUTPATIENT)
Age: 50
End: 2020-06-04

## 2020-07-13 ENCOUNTER — TRANSCRIPTION ENCOUNTER (OUTPATIENT)
Age: 50
End: 2020-07-13

## 2020-08-31 ENCOUNTER — APPOINTMENT (OUTPATIENT)
Dept: OBGYN | Facility: CLINIC | Age: 50
End: 2020-08-31

## 2020-09-03 ENCOUNTER — TRANSCRIPTION ENCOUNTER (OUTPATIENT)
Age: 50
End: 2020-09-03

## 2020-09-09 ENCOUNTER — TRANSCRIPTION ENCOUNTER (OUTPATIENT)
Age: 50
End: 2020-09-09

## 2020-10-07 LAB
25(OH)D3 SERPL-MCNC: 19 NG/ML
ALBUMIN SERPL ELPH-MCNC: 3.9 G/DL
ALP BLD-CCNC: 46 U/L
ALT SERPL-CCNC: 11 U/L
ANION GAP SERPL CALC-SCNC: 12 MMOL/L
AST SERPL-CCNC: 13 U/L
BASOPHILS # BLD AUTO: 0.06 K/UL
BASOPHILS NFR BLD AUTO: 1.2 %
BILIRUB SERPL-MCNC: 0.2 MG/DL
BUN SERPL-MCNC: <5 MG/DL
CALCIUM SERPL-MCNC: 9 MG/DL
CHLORIDE SERPL-SCNC: 106 MMOL/L
CHOLEST SERPL-MCNC: 131 MG/DL
CHOLEST/HDLC SERPL: 1.5 RATIO
CO2 SERPL-SCNC: 22 MMOL/L
CREAT SERPL-MCNC: 0.8 MG/DL
EOSINOPHIL # BLD AUTO: 0.39 K/UL
EOSINOPHIL NFR BLD AUTO: 7.9 %
ESTIMATED AVERAGE GLUCOSE: 123 MG/DL
GLUCOSE SERPL-MCNC: 85 MG/DL
HBA1C MFR BLD HPLC: 5.9 %
HCT VFR BLD CALC: 25.3 %
HDLC SERPL-MCNC: 90 MG/DL
HGB BLD-MCNC: 7.1 G/DL
IMM GRANULOCYTES NFR BLD AUTO: 0.2 %
LDLC SERPL CALC-MCNC: 28 MG/DL
LYMPHOCYTES # BLD AUTO: 1.77 K/UL
LYMPHOCYTES NFR BLD AUTO: 35.9 %
MAN DIFF?: NORMAL
MCHC RBC-ENTMCNC: 21.3 PG
MCHC RBC-ENTMCNC: 28.1 G/DL
MCV RBC AUTO: 75.7 FL
MONOCYTES # BLD AUTO: 0.31 K/UL
MONOCYTES NFR BLD AUTO: 6.3 %
NEUTROPHILS # BLD AUTO: 2.39 K/UL
NEUTROPHILS NFR BLD AUTO: 48.5 %
PLATELET # BLD AUTO: 332 K/UL
POTASSIUM SERPL-SCNC: 4 MMOL/L
PROT SERPL-MCNC: 6.8 G/DL
RBC # BLD: 3.34 M/UL
RBC # FLD: 19.9 %
SODIUM SERPL-SCNC: 140 MMOL/L
TRIGL SERPL-MCNC: 100 MG/DL
WBC # FLD AUTO: 4.93 K/UL

## 2020-10-08 LAB — TSH SERPL-ACNC: 1.74 UIU/ML

## 2020-10-09 ENCOUNTER — APPOINTMENT (OUTPATIENT)
Dept: INTERNAL MEDICINE | Facility: CLINIC | Age: 50
End: 2020-10-09

## 2020-10-13 ENCOUNTER — TRANSCRIPTION ENCOUNTER (OUTPATIENT)
Age: 50
End: 2020-10-13

## 2020-10-26 ENCOUNTER — APPOINTMENT (OUTPATIENT)
Dept: INTERNAL MEDICINE | Facility: CLINIC | Age: 50
End: 2020-10-26
Payer: MEDICAID

## 2020-10-26 ENCOUNTER — NON-APPOINTMENT (OUTPATIENT)
Age: 50
End: 2020-10-26

## 2020-10-26 ENCOUNTER — OUTPATIENT (OUTPATIENT)
Dept: OUTPATIENT SERVICES | Facility: HOSPITAL | Age: 50
LOS: 1 days | Discharge: HOME | End: 2020-10-26

## 2020-10-26 VITALS
TEMPERATURE: 96.6 F | HEART RATE: 95 BPM | WEIGHT: 121 LBS | BODY MASS INDEX: 20.16 KG/M2 | HEIGHT: 65 IN | SYSTOLIC BLOOD PRESSURE: 117 MMHG | DIASTOLIC BLOOD PRESSURE: 64 MMHG

## 2020-10-26 DIAGNOSIS — K46.9 UNSPECIFIED ABDOMINAL HERNIA WITHOUT OBSTRUCTION OR GANGRENE: Chronic | ICD-10-CM

## 2020-10-26 PROCEDURE — 99214 OFFICE O/P EST MOD 30 MIN: CPT | Mod: GC

## 2020-10-26 RX ORDER — TIOTROPIUM BROMIDE INHALATION SPRAY 3.12 UG/1
2.5 SPRAY, METERED RESPIRATORY (INHALATION) DAILY
Qty: 1 | Refills: 1 | Status: DISCONTINUED | COMMUNITY
Start: 2020-05-21 | End: 2020-10-26

## 2020-10-26 NOTE — REVIEW OF SYSTEMS
[Shortness Of Breath] : shortness of breath [Cough] : cough [Joint Pain] : joint pain [Fever] : no fever [Chills] : no chills [Fatigue] : no fatigue [Discharge] : no discharge [Pain] : no pain [Earache] : no earache [Hearing Loss] : no hearing loss [Chest Pain] : no chest pain [Palpitations] : no palpitations [Orthopnea] : no orthopnea [Paroxysmal Nocturnal Dyspnea] : no paroxysmal nocturnal dyspnea [Abdominal Pain] : no abdominal pain [Nausea] : no nausea [Vomiting] : no vomiting [Heartburn] : no heartburn [Dysuria] : no dysuria [Incontinence] : no incontinence [Hematuria] : no hematuria [Itching] : no itching [Headache] : no headache [Suicidal] : not suicidal [Easy Bleeding] : no easy bleeding

## 2020-10-26 NOTE — ASSESSMENT
[FreeTextEntry1] : 49 y/o old female pt with past medical hx of COPD/asthma on treatment, hyperthyroidism on methimazole, microcytic anemia, cervical polyp s/p resection, recurrent umbilical hernia, came for follow up. Last hospital admission was for COPD in December 2018. Never intubated.\par \par Pt states she has been having shortness of breath for past one week.Pt can walk 2 blocks before she gets short of breath, wakes up frequently at night due to frequent coughing. Nebulizer helps with cough at night. Cough frequency has increased but amount of phlegm and color (clear) remained the same. 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 Previously had refused colonoscopy, was agreeable to FIT test but states she still did not do it. Now pt agreeable for colonoscopy, will send to pulm for clearance for colonoscopy.\par \par \par # COPD/former smoker\par increased frequency of cough and SOB\par prednisone 40 mg x 5 days\par cw nebs/albuterol and symbicort\par \par \par # Microcytic anemia\par fu iron studies\par fu transvaginal ultrasound\par fu obgyn\par GI referral for colonoscopy\par \par # Vitamin D insufficiency\par vitamin D supplements\par \par # GERD\par cw PPI\par pt advised on dietary and lifestyle modification\par \par \par # Hyperthyroidism\par cw methimazole\par TSH wnl\par \par \par # Uterine/cervical polyp\par s/p resection earlier 2020\par ordered another transvag usg, \par fu obgyn\par \par \par HCM\par pulm fu\par GI regerral\par fu in 3 months and prn\par iron studies\par transvag usg\par

## 2020-10-26 NOTE — HISTORY OF PRESENT ILLNESS
[FreeTextEntry1] : follow up visit [de-identified] : 49 y/o old female pt with past medical hx of COPD/asthma on treatment, hyperthyroidism on methimazole, microcytic anemia, cervical polyp s/p resection, recurrent umbilical hernia, came for follow up. Last hospital admission was for COPD in December 2018. Never intubated.\par \par Pt states she has been having shortness of breath for past one week.Pt can walk 2 blocks before she gets short of breath, wakes up frequently at night due to frequent coughing. Nebulizer helps with cough at night. Cough frequency has increased but amount of phlegm and color (clear) remained the same. 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 Previously had refused colonoscopy, was agreeable to FIT test but states she still did not do it. Now pt agreeable for colonoscopy, will send to pul for clearance for colonoscopy.

## 2020-10-26 NOTE — PHYSICAL EXAM
[No Acute Distress] : no acute distress [Well Nourished] : well nourished [PERRL] : pupils equal round and reactive to light [Normal Outer Ear/Nose] : the outer ears and nose were normal in appearance [Supple] : supple [Normal Rate] : normal rate  [Regular Rhythm] : with a regular rhythm [No Edema] : there was no peripheral edema [No CVA Tenderness] : no CVA  tenderness [No Spinal Tenderness] : no spinal tenderness [Grossly Normal Strength/Tone] : grossly normal strength/tone [No Rash] : no rash [Coordination Grossly Intact] : coordination grossly intact [No Focal Deficits] : no focal deficits [Normal Affect] : the affect was normal [No Respiratory Distress] : no respiratory distress  [No Accessory Muscle Use] : no accessory muscle use [Clear to Auscultation] : lungs were clear to auscultation bilaterally

## 2020-10-29 DIAGNOSIS — N84.0 POLYP OF CORPUS UTERI: ICD-10-CM

## 2020-10-29 DIAGNOSIS — D64.9 ANEMIA, UNSPECIFIED: ICD-10-CM

## 2020-10-29 DIAGNOSIS — J44.9 CHRONIC OBSTRUCTIVE PULMONARY DISEASE, UNSPECIFIED: ICD-10-CM

## 2020-10-29 DIAGNOSIS — E55.9 VITAMIN D DEFICIENCY, UNSPECIFIED: ICD-10-CM

## 2020-10-29 DIAGNOSIS — E05.90 THYROTOXICOSIS, UNSPECIFIED WITHOUT THYROTOXIC CRISIS OR STORM: ICD-10-CM

## 2020-10-29 DIAGNOSIS — Z86.2 PERSONAL HISTORY OF DISEASES OF THE BLOOD AND BLOOD-FORMING ORGANS AND CERTAIN DISORDERS INVOLVING THE IMMUNE MECHANISM: ICD-10-CM

## 2020-10-29 DIAGNOSIS — K21.9 GASTRO-ESOPHAGEAL REFLUX DISEASE WITHOUT ESOPHAGITIS: ICD-10-CM

## 2020-10-29 DIAGNOSIS — Z00.00 ENCOUNTER FOR GENERAL ADULT MEDICAL EXAMINATION WITHOUT ABNORMAL FINDINGS: ICD-10-CM

## 2020-11-23 ENCOUNTER — TRANSCRIPTION ENCOUNTER (OUTPATIENT)
Age: 50
End: 2020-11-23

## 2020-11-23 ENCOUNTER — EMERGENCY (EMERGENCY)
Facility: HOSPITAL | Age: 50
LOS: 0 days | Discharge: HOME | End: 2020-11-23
Attending: STUDENT IN AN ORGANIZED HEALTH CARE EDUCATION/TRAINING PROGRAM | Admitting: STUDENT IN AN ORGANIZED HEALTH CARE EDUCATION/TRAINING PROGRAM
Payer: MEDICAID

## 2020-11-23 VITALS
DIASTOLIC BLOOD PRESSURE: 75 MMHG | SYSTOLIC BLOOD PRESSURE: 124 MMHG | TEMPERATURE: 99 F | HEIGHT: 65 IN | RESPIRATION RATE: 18 BRPM | OXYGEN SATURATION: 100 % | HEART RATE: 107 BPM

## 2020-11-23 DIAGNOSIS — Z87.891 PERSONAL HISTORY OF NICOTINE DEPENDENCE: ICD-10-CM

## 2020-11-23 DIAGNOSIS — U07.1 COVID-19: ICD-10-CM

## 2020-11-23 DIAGNOSIS — Z98.890 OTHER SPECIFIED POSTPROCEDURAL STATES: ICD-10-CM

## 2020-11-23 DIAGNOSIS — K46.9 UNSPECIFIED ABDOMINAL HERNIA WITHOUT OBSTRUCTION OR GANGRENE: Chronic | ICD-10-CM

## 2020-11-23 DIAGNOSIS — R50.9 FEVER, UNSPECIFIED: ICD-10-CM

## 2020-11-23 DIAGNOSIS — K21.9 GASTRO-ESOPHAGEAL REFLUX DISEASE WITHOUT ESOPHAGITIS: ICD-10-CM

## 2020-11-23 DIAGNOSIS — Z79.51 LONG TERM (CURRENT) USE OF INHALED STEROIDS: ICD-10-CM

## 2020-11-23 DIAGNOSIS — J44.1 CHRONIC OBSTRUCTIVE PULMONARY DISEASE WITH (ACUTE) EXACERBATION: ICD-10-CM

## 2020-11-23 DIAGNOSIS — Z79.899 OTHER LONG TERM (CURRENT) DRUG THERAPY: ICD-10-CM

## 2020-11-23 DIAGNOSIS — Z86.2 PERSONAL HISTORY OF DISEASES OF THE BLOOD AND BLOOD-FORMING ORGANS AND CERTAIN DISORDERS INVOLVING THE IMMUNE MECHANISM: ICD-10-CM

## 2020-11-23 LAB
ALBUMIN SERPL ELPH-MCNC: 4.2 G/DL — SIGNIFICANT CHANGE UP (ref 3.5–5.2)
ALP SERPL-CCNC: 53 U/L — SIGNIFICANT CHANGE UP (ref 30–115)
ALT FLD-CCNC: 16 U/L — SIGNIFICANT CHANGE UP (ref 0–41)
ANION GAP SERPL CALC-SCNC: 12 MMOL/L — SIGNIFICANT CHANGE UP (ref 7–14)
AST SERPL-CCNC: 69 U/L — HIGH (ref 0–41)
BASOPHILS # BLD AUTO: 0.02 K/UL — SIGNIFICANT CHANGE UP (ref 0–0.2)
BASOPHILS NFR BLD AUTO: 0.4 % — SIGNIFICANT CHANGE UP (ref 0–1)
BILIRUB SERPL-MCNC: 0.4 MG/DL — SIGNIFICANT CHANGE UP (ref 0.2–1.2)
BUN SERPL-MCNC: 6 MG/DL — LOW (ref 10–20)
CALCIUM SERPL-MCNC: 9.4 MG/DL — SIGNIFICANT CHANGE UP (ref 8.5–10.1)
CHLORIDE SERPL-SCNC: 97 MMOL/L — LOW (ref 98–110)
CO2 SERPL-SCNC: 21 MMOL/L — SIGNIFICANT CHANGE UP (ref 17–32)
CREAT SERPL-MCNC: 0.9 MG/DL — SIGNIFICANT CHANGE UP (ref 0.7–1.5)
EOSINOPHIL # BLD AUTO: 0.02 K/UL — SIGNIFICANT CHANGE UP (ref 0–0.7)
EOSINOPHIL NFR BLD AUTO: 0.4 % — SIGNIFICANT CHANGE UP (ref 0–8)
GLUCOSE SERPL-MCNC: 123 MG/DL — HIGH (ref 70–99)
HCG SERPL QL: NEGATIVE — SIGNIFICANT CHANGE UP
HCT VFR BLD CALC: 34.5 % — LOW (ref 37–47)
HGB BLD-MCNC: 10.1 G/DL — LOW (ref 12–16)
IMM GRANULOCYTES NFR BLD AUTO: 0.2 % — SIGNIFICANT CHANGE UP (ref 0.1–0.3)
LACTATE SERPL-SCNC: 1.1 MMOL/L — SIGNIFICANT CHANGE UP (ref 0.7–2)
LYMPHOCYTES # BLD AUTO: 1.55 K/UL — SIGNIFICANT CHANGE UP (ref 1.2–3.4)
LYMPHOCYTES # BLD AUTO: 31.8 % — SIGNIFICANT CHANGE UP (ref 20.5–51.1)
MCHC RBC-ENTMCNC: 21.9 PG — LOW (ref 27–31)
MCHC RBC-ENTMCNC: 29.3 G/DL — LOW (ref 32–37)
MCV RBC AUTO: 74.7 FL — LOW (ref 81–99)
MONOCYTES # BLD AUTO: 0.52 K/UL — SIGNIFICANT CHANGE UP (ref 0.1–0.6)
MONOCYTES NFR BLD AUTO: 10.7 % — HIGH (ref 1.7–9.3)
NEUTROPHILS # BLD AUTO: 2.75 K/UL — SIGNIFICANT CHANGE UP (ref 1.4–6.5)
NEUTROPHILS NFR BLD AUTO: 56.5 % — SIGNIFICANT CHANGE UP (ref 42.2–75.2)
NRBC # BLD: 0 /100 WBCS — SIGNIFICANT CHANGE UP (ref 0–0)
PLATELET # BLD AUTO: 395 K/UL — SIGNIFICANT CHANGE UP (ref 130–400)
POTASSIUM SERPL-MCNC: 5.7 MMOL/L — HIGH (ref 3.5–5)
POTASSIUM SERPL-SCNC: 5.7 MMOL/L — HIGH (ref 3.5–5)
PROT SERPL-MCNC: 8.5 G/DL — HIGH (ref 6–8)
RBC # BLD: 4.62 M/UL — SIGNIFICANT CHANGE UP (ref 4.2–5.4)
RBC # FLD: 19.5 % — HIGH (ref 11.5–14.5)
SARS-COV-2 RNA SPEC QL NAA+PROBE: DETECTED
SODIUM SERPL-SCNC: 130 MMOL/L — LOW (ref 135–146)
TROPONIN T SERPL-MCNC: <0.01 NG/ML — SIGNIFICANT CHANGE UP
WBC # BLD: 4.87 K/UL — SIGNIFICANT CHANGE UP (ref 4.8–10.8)
WBC # FLD AUTO: 4.87 K/UL — SIGNIFICANT CHANGE UP (ref 4.8–10.8)

## 2020-11-23 PROCEDURE — 99285 EMERGENCY DEPT VISIT HI MDM: CPT

## 2020-11-23 PROCEDURE — 93010 ELECTROCARDIOGRAM REPORT: CPT

## 2020-11-23 PROCEDURE — 71045 X-RAY EXAM CHEST 1 VIEW: CPT | Mod: 26

## 2020-11-23 RX ORDER — ALBUTEROL 90 UG/1
1 AEROSOL, METERED ORAL ONCE
Refills: 0 | Status: COMPLETED | OUTPATIENT
Start: 2020-11-23 | End: 2020-11-23

## 2020-11-23 RX ORDER — AZITHROMYCIN 500 MG/1
1 TABLET, FILM COATED ORAL
Qty: 1 | Refills: 0
Start: 2020-11-23 | End: 2020-11-27

## 2020-11-23 RX ADMIN — ALBUTEROL 1 PUFF(S): 90 AEROSOL, METERED ORAL at 14:32

## 2020-11-23 RX ADMIN — Medication 125 MILLIGRAM(S): at 14:32

## 2020-11-23 NOTE — ED PROVIDER NOTE - OBJECTIVE STATEMENT
50y F pmh anemia, gerd, hyperthyroidism, copd presents for eval of sob. Pt has 3 days of moderate sob with associated chest pain, np cough, fever tmax 100F, minimal improvement with albuterol and ibuprofen, no aggravating factors. Denies ha, laird, abd pain, weakness, numbness, n/v/d/c, dysuria, hematuria

## 2020-11-23 NOTE — ED PROVIDER NOTE - PATIENT PORTAL LINK FT
You can access the FollowMyHealth Patient Portal offered by [facility] by registering at the following website: [email or web address]. By joining [employer]’s FollowMyHealth portal, you will also be able to view your health information using other applications (apps) compatible with our system.

## 2020-11-23 NOTE — ED PROVIDER NOTE - CLINICAL SUMMARY MEDICAL DECISION MAKING FREE TEXT BOX
sx improving w/ ed management. w/u grossly normal. no pna, covid swab sent. discussed outpt f/u and return precautions

## 2020-11-23 NOTE — ED ADULT NURSE NOTE - OBJECTIVE STATEMENT
Patient c/o sob and cough, and temperature 100 at home prior to arrival since last night. Patient states she has pmh of copd. Patient also requesting rapid covid swab in order to return back to work - patient states [facility] and [facility] came back inconclusive. On assessment no s/s of resp distress noted, airway patent, patient speaking with RN in full sentences.

## 2020-11-23 NOTE — ED PROVIDER NOTE - ATTENDING CONTRIBUTION TO CARE
49 yo f hx anemia, gerd, hyperthyroid, copd presents w/ sob. pt endorsing 3 days SOB, cough, chest pain (worse w/ cough), low grade temp. mild improvement w/ albuterol/ibuprofen. no ha, laird, syncope, ap, numbness, weakness, nausea, vomiting, diarrhea.    vss  gen- NAD, aaox3  card-rrr  lungs-no increased WOB, mild rhonchi w/ exp wheeze, satting well on RA  abd-sntnd, no guarding or rebound  neuro- full str/sensation, cn ii-xii grossly intact, normal coordination and gait  LE- no edema    likely viral illness, possible covid, exacerbating COPD  will treat for copd  will get basic labs, cxr, ekg/trop to r/o atypical acs  will provide supportive care, serial exam and ED observation period

## 2020-11-23 NOTE — ED PROVIDER NOTE - NS ED ROS FT
Constitutional: (+) fever  Eyes/ENT: (-) blurry vision, (-) epistaxis  Cardiovascular: (+) chest pain, (-) syncope  Respiratory: (+) cough, (+) shortness of breath  Gastrointestinal: (-) vomiting, (-) diarrhea  : (-) dysuria, (-) hematuria  Musculoskeletal: (-) neck pain, (-) back pain, (-) joint pain  Integumentary: (-) rash, (-) edema  Neurological: (-) headache, (-) altered mental status  Allergic/Immunologic: (-) pruritus

## 2020-11-23 NOTE — ED PROVIDER NOTE - NSFOLLOWUPINSTRUCTIONS_ED_ALL_ED_FT
Follow up with PMD and Pulmonologist in 1-2 days.    Chronic Obstructive Pulmonary Disease    Chronic obstructive pulmonary disease (COPD) is a lung condition in which airflow from the lungs is limited. Causes include smoking, secondhand smoke exposure, genetics, or recurrent infections. Take all medicines (inhaled or pills) as directed by your health care provider. Avoid exposure to irritants such as smoke, chemicals, and fumes that aggravate your breathing.    If you are a smoker, the most important thing that you can do is stop smoking. Continuing to smoke will cause further lung damage and breathing trouble. Ask your health care provider for help with quitting smoking.    SEEK IMMEDIATE MEDICAL CARE IF YOU HAVE ANY OF THE FOLLOWING SYMPTOMS: shortness of breath at rest or when talking, bluish discoloration of lips, skin, fever, worsening cough, unexplained chest pain, or lightheadedness/dizziness.

## 2020-11-23 NOTE — ED ADULT TRIAGE NOTE - CHIEF COMPLAINT QUOTE
Patient BIBA from home complaining of fever, cough, weakness and shortness of breath. patient had a covid test with inconclusive results

## 2020-11-23 NOTE — ED PROVIDER NOTE - PHYSICAL EXAMINATION
CONST: NAD  EYES: Sclera and conjunctiva clear.   ENT: No nasal discharge. Oropharynx normal appearing, no erythema or exudates. No abscess or swelling. Uvula midline.   NECK: Non-tender, no meningeal signs. normal ROM. supple   CARD: S1 S2; No jvd  RESP: Rhonchi with expiratory wheeze, spO2 98% ra  GI: Soft, non-tender, non-distended. no cva tenderness. normal BS  MS: Normal ROM in all extremities. pulses 2 +. no calf tenderness or swelling  SKIN: Warm, dry, no acute rashes. Good turgor  NEURO: A&Ox3, No focal deficits. Strength 5/5 with no sensory deficits. Steady gait.

## 2020-12-21 PROBLEM — Z86.19 HISTORY OF CANDIDIASIS OF VAGINA: Status: RESOLVED | Noted: 2019-08-19 | Resolved: 2020-12-21

## 2020-12-23 ENCOUNTER — TRANSCRIPTION ENCOUNTER (OUTPATIENT)
Age: 50
End: 2020-12-23

## 2021-02-09 ENCOUNTER — TRANSCRIPTION ENCOUNTER (OUTPATIENT)
Age: 51
End: 2021-02-09

## 2021-03-22 ENCOUNTER — APPOINTMENT (OUTPATIENT)
Dept: INTERNAL MEDICINE | Facility: CLINIC | Age: 51
End: 2021-03-22

## 2021-03-29 ENCOUNTER — APPOINTMENT (OUTPATIENT)
Dept: INTERNAL MEDICINE | Facility: CLINIC | Age: 51
End: 2021-03-29

## 2021-04-21 ENCOUNTER — APPOINTMENT (OUTPATIENT)
Dept: INTERNAL MEDICINE | Facility: CLINIC | Age: 51
End: 2021-04-21

## 2021-06-05 ENCOUNTER — TRANSCRIPTION ENCOUNTER (OUTPATIENT)
Age: 51
End: 2021-06-05

## 2021-06-05 NOTE — ED ADULT NURSE NOTE - CHPI ED SYMPTOMS POS
SHORTNESS OF BREATH/CHEST CONGESTION/COUGH/DYSPNEA ON EXERTION/DIFFICULTY BREATHING
Speaking Coherently

## 2021-06-11 ENCOUNTER — OUTPATIENT (OUTPATIENT)
Dept: OUTPATIENT SERVICES | Facility: HOSPITAL | Age: 51
LOS: 1 days | Discharge: HOME | End: 2021-06-11

## 2021-06-11 ENCOUNTER — APPOINTMENT (OUTPATIENT)
Dept: INTERNAL MEDICINE | Facility: CLINIC | Age: 51
End: 2021-06-11
Payer: MEDICAID

## 2021-06-11 VITALS
BODY MASS INDEX: 18.33 KG/M2 | HEART RATE: 73 BPM | SYSTOLIC BLOOD PRESSURE: 124 MMHG | DIASTOLIC BLOOD PRESSURE: 80 MMHG | WEIGHT: 110 LBS | TEMPERATURE: 97.7 F | OXYGEN SATURATION: 98 % | HEIGHT: 65 IN

## 2021-06-11 DIAGNOSIS — K46.9 UNSPECIFIED ABDOMINAL HERNIA WITHOUT OBSTRUCTION OR GANGRENE: Chronic | ICD-10-CM

## 2021-06-11 LAB
25(OH)D3 SERPL-MCNC: 27 NG/ML
ALBUMIN SERPL ELPH-MCNC: 4.1 G/DL
ALP BLD-CCNC: 52 U/L
ALT SERPL-CCNC: 11 U/L
ANION GAP SERPL CALC-SCNC: 11 MMOL/L
AST SERPL-CCNC: 13 U/L
BASOPHILS # BLD AUTO: 0.05 K/UL
BASOPHILS NFR BLD AUTO: 0.7 %
BILIRUB SERPL-MCNC: 0.3 MG/DL
BUN SERPL-MCNC: 6 MG/DL
CALCIUM SERPL-MCNC: 8.9 MG/DL
CHLORIDE SERPL-SCNC: 96 MMOL/L
CHOLEST SERPL-MCNC: 155 MG/DL
CO2 SERPL-SCNC: 29 MMOL/L
CREAT SERPL-MCNC: 0.8 MG/DL
EOSINOPHIL # BLD AUTO: 0.17 K/UL
EOSINOPHIL NFR BLD AUTO: 2.4 %
ESTIMATED AVERAGE GLUCOSE: 94 MG/DL
GLUCOSE SERPL-MCNC: 88 MG/DL
HBA1C MFR BLD HPLC: 4.9 %
HCT VFR BLD CALC: 25.7 %
HDLC SERPL-MCNC: 120 MG/DL
HGB BLD-MCNC: 7.4 G/DL
IMM GRANULOCYTES NFR BLD AUTO: 0.4 %
LDLC SERPL CALC-MCNC: 27 MG/DL
LYMPHOCYTES # BLD AUTO: 3.02 K/UL
LYMPHOCYTES NFR BLD AUTO: 42 %
MAGNESIUM SERPL-MCNC: 1.9 MG/DL
MAN DIFF?: NORMAL
MCHC RBC-ENTMCNC: 22.2 PG
MCHC RBC-ENTMCNC: 28.8 G/DL
MCV RBC AUTO: 76.9 FL
MONOCYTES # BLD AUTO: 0.61 K/UL
MONOCYTES NFR BLD AUTO: 8.5 %
NEUTROPHILS # BLD AUTO: 3.31 K/UL
NEUTROPHILS NFR BLD AUTO: 46 %
NONHDLC SERPL-MCNC: 35 MG/DL
PLATELET # BLD AUTO: 448 K/UL
POTASSIUM SERPL-SCNC: 3.8 MMOL/L
PROT SERPL-MCNC: 7.7 G/DL
RBC # BLD: 3.34 M/UL
RBC # FLD: 18.5 %
SODIUM SERPL-SCNC: 136 MMOL/L
TRIGL SERPL-MCNC: 123 MG/DL
WBC # FLD AUTO: 7.19 K/UL

## 2021-06-11 PROCEDURE — 99213 OFFICE O/P EST LOW 20 MIN: CPT | Mod: GC

## 2021-06-11 NOTE — HISTORY OF PRESENT ILLNESS
[FreeTextEntry1] : follow up  [de-identified] : 47 y/o old female pt with past medical hx of COPD/asthma on treatment, hyperthyroidism on methimazole, microcytic anemia, cervical polyp s/p resection, recurrent umbilical hernia, came for follow up. Last hospital admission was for COPD in December 2018. Never intubated.\par \par pt states she has been having night awakenings from her COPD 2/2 coughing. patient is compliant with her medications. she would like ot get her medication refilled today.\par Pt failed to follow up with surgery for hernia eval. her umbilical hernia is non-reducible\par pt failed to follow up with GI for colonoscopy. she also needs pulm clerance for colonoscopy\par Patient states she has been feeling fatigued lately, her depression screening is positive, from the questionnaire. She denies any suicidal ideations. \par Patient would like to get dentist referral today for her teeth loss\par of note, patient's last Hb in oct 2020 was 7.1, patient endorses heavy mensuration bleeding. patient is advised to get blood work done after her appointment today and follow up with OB GYN.

## 2021-06-11 NOTE — PHYSICAL EXAM
[No Respiratory Distress] : no respiratory distress  [Non Tender] : non-tender [Non-distended] : non-distended [Normal] : no joint swelling and grossly normal strength and tone [de-identified] : mild wheezing b/l [de-identified] : non reducible umbilical hernia

## 2021-06-11 NOTE — ASSESSMENT
[FreeTextEntry1] : 49 y/o old female pt with past medical hx of COPD/asthma on treatment, hyperthyroidism on methimazole, microcytic anemia, cervical polyp s/p resection, recurrent umbilical hernia, came for follow up. Last hospital admission was for COPD in December 2018. Never intubated.\par \par pt states she has been having night awakenings from her COPD 2/2 coughing. patient is compliant with her medications. she would like ot get her medication refilled today.\par Pt failed to follow up with surgery for hernia eval. her umbilical hernia is non-reducible\par pt failed to follow up with GI for colonoscopy. she also needs pulm clerance for colonoscopy\par Patient states she has been feeling fatigued lately, her depression screening is positive, from the questionnaire. She denies any suicidal ideations. \par Patient would like to get dentist referral today for her teeth loss\par of note, patient's last Hb in oct 2020 was 7.1, patient endorses heavy mensuration bleeding. patient is advised to get blood work done after her appointment today and follow up with OB GYN.\par \par # COPD/former smoker\par c/o cough and SOB\par cw nebs/albuterol and symbicort\par \par \par # Microcytic anemia\par last hb 7.1 in oct 2020\par fu iron studies\par fu transvaginal ultrasound\par fu obgyn\par GI referral for colonoscopy\par advise to get blood work done after appointment\par \par # Vitamin D insufficiency\par c/w vitamin D supplements\par \par # GERD\par cw PPI\par pt advised on dietary and lifestyle modification\par \par \par # Hyperthyroidism\par cw methimazole\par TSH wnl\par \par \par #HCM\par medication refilled today\par lab work ordered\par pulm fu\par GI referral\par fu in 3 months and prn

## 2021-06-15 ENCOUNTER — NON-APPOINTMENT (OUTPATIENT)
Age: 51
End: 2021-06-15

## 2021-06-15 DIAGNOSIS — E05.90 THYROTOXICOSIS, UNSPECIFIED WITHOUT THYROTOXIC CRISIS OR STORM: ICD-10-CM

## 2021-06-15 DIAGNOSIS — E55.9 VITAMIN D DEFICIENCY, UNSPECIFIED: ICD-10-CM

## 2021-06-15 DIAGNOSIS — Z00.00 ENCOUNTER FOR GENERAL ADULT MEDICAL EXAMINATION WITHOUT ABNORMAL FINDINGS: ICD-10-CM

## 2021-06-15 DIAGNOSIS — D64.9 ANEMIA, UNSPECIFIED: ICD-10-CM

## 2021-06-15 DIAGNOSIS — J44.9 CHRONIC OBSTRUCTIVE PULMONARY DISEASE, UNSPECIFIED: ICD-10-CM

## 2021-06-15 DIAGNOSIS — K46.9 UNSPECIFIED ABDOMINAL HERNIA WITHOUT OBSTRUCTION OR GANGRENE: ICD-10-CM

## 2021-06-15 DIAGNOSIS — F32.9 MAJOR DEPRESSIVE DISORDER, SINGLE EPISODE, UNSPECIFIED: ICD-10-CM

## 2021-06-15 DIAGNOSIS — N92.0 EXCESSIVE AND FREQUENT MENSTRUATION WITH REGULAR CYCLE: ICD-10-CM

## 2021-07-24 ENCOUNTER — TRANSCRIPTION ENCOUNTER (OUTPATIENT)
Age: 51
End: 2021-07-24

## 2021-08-06 ENCOUNTER — OUTPATIENT (OUTPATIENT)
Dept: OUTPATIENT SERVICES | Facility: HOSPITAL | Age: 51
LOS: 1 days | Discharge: HOME | End: 2021-08-06

## 2021-08-06 ENCOUNTER — APPOINTMENT (OUTPATIENT)
Dept: GASTROENTEROLOGY | Facility: CLINIC | Age: 51
End: 2021-08-06
Payer: MEDICAID

## 2021-08-06 ENCOUNTER — NON-APPOINTMENT (OUTPATIENT)
Age: 51
End: 2021-08-06

## 2021-08-06 VITALS
TEMPERATURE: 96.8 F | RESPIRATION RATE: 16 BRPM | OXYGEN SATURATION: 98 % | WEIGHT: 113 LBS | HEIGHT: 65 IN | DIASTOLIC BLOOD PRESSURE: 73 MMHG | SYSTOLIC BLOOD PRESSURE: 110 MMHG | BODY MASS INDEX: 18.83 KG/M2 | HEART RATE: 94 BPM

## 2021-08-06 DIAGNOSIS — F12.90 CANNABIS USE, UNSPECIFIED, UNCOMPLICATED: ICD-10-CM

## 2021-08-06 DIAGNOSIS — K46.9 UNSPECIFIED ABDOMINAL HERNIA WITHOUT OBSTRUCTION OR GANGRENE: Chronic | ICD-10-CM

## 2021-08-06 PROCEDURE — 99203 OFFICE O/P NEW LOW 30 MIN: CPT | Mod: GC

## 2021-08-06 PROCEDURE — 99213 OFFICE O/P EST LOW 20 MIN: CPT

## 2021-08-06 RX ORDER — PREDNISONE 20 MG/1
20 TABLET ORAL TWICE DAILY
Qty: 10 | Refills: 0 | Status: COMPLETED | COMMUNITY
Start: 2020-10-26 | End: 2021-08-06

## 2021-08-06 NOTE — END OF VISIT
[] : Resident [FreeTextEntry3] : Discussed with resident, Patient with heavy vaginal bleeding. Stressed on GYN evaluation. EGD and colonoscopy to follow no prior studies. No overt GI bleeding. Has an abdominal hernia, will schedule evaluation with surgery. Patient is scheduled for pulm eval

## 2021-08-06 NOTE — PHYSICAL EXAM
[Normal Sclera/Conjunctiva] : normal sclera/conjunctiva [Supple] : supple [No Respiratory Distress] : no respiratory distress  [No Edema] : there was no peripheral edema [Non-distended] : non-distended [Normal Bowel Sounds] : normal bowel sounds [Normal Anterior Cervical Nodes] : no anterior cervical lymphadenopathy [No CVA Tenderness] : no CVA  tenderness [Normal] : no joint swelling and grossly normal strength and tone [No Accessory Muscle Use] : no accessory muscle use [de-identified] : non reducible umbilical hernia, tender to palpate

## 2021-08-06 NOTE — ASSESSMENT
[FreeTextEntry1] : 51 y/o old female pt with past medical hx of COPD/asthma not on home O2, hyperthyroidism on methimazole, microcytic anemia, cervical polyp s/p resection, recurrent umbilical hernia s/p surgery in 2010 referred for anemia workup and CRC screening. \par \par \par # Microcytic anemia\par # CRC screening\par -  hgb 7.4 in July 2021\par - Pt had heavy and prolonged menses :  SHON f/up ( she has an appointment this month)\par - fu iron studies as per PCP\par - never had colonoscopy before; needs pulm clearance prior to procedure\par \par \par # GERD\par - Symptoms controlled \par - cw PPI\par - counseled on dietary and lifestyle modification\par \par # RTC after Pulm clearance\par \par \par

## 2021-08-06 NOTE — HISTORY OF PRESENT ILLNESS
[Heartburn] : stable heartburn [Nausea] : denies nausea [Vomiting] : denies vomiting [Diarrhea] : denies diarrhea [Constipation] : denies constipation [Abdominal Pain] : stable abdominal pain [Abdominal Swelling] : abdominal swelling stable [de-identified] : 49 y/o old female pt with past medical hx of COPD/asthma not on home O2, hyperthyroidism on methimazole, microcytic anemia, cervical polyp s/p resection,  umbilical hernia s/p surgery in 2010 referred for anemia workup and CRC screening. \par \par She noted heavy prolonged menstrual bleeding for the last year with 20lbs weight loss over 18  months , attribute it to her job and to her diet (small meals). \par Also she had worsening of her SOB. \par Her GERD is controlled with PPI. \par she also noted abdominal pain  in the hernia site when she over-eat o and pull/lift heavy objects. Denies any chest pain, nausea, vomiting, hematemesis, hematochezia, melena, no other complaints. \par \par \par \par \par

## 2021-08-10 DIAGNOSIS — K21.9 GASTRO-ESOPHAGEAL REFLUX DISEASE WITHOUT ESOPHAGITIS: ICD-10-CM

## 2021-08-10 DIAGNOSIS — F64.9 GENDER IDENTITY DISORDER, UNSPECIFIED: ICD-10-CM

## 2021-08-16 ENCOUNTER — OUTPATIENT (OUTPATIENT)
Dept: OUTPATIENT SERVICES | Facility: HOSPITAL | Age: 51
LOS: 1 days | Discharge: HOME | End: 2021-08-16

## 2021-08-16 ENCOUNTER — APPOINTMENT (OUTPATIENT)
Dept: OBGYN | Facility: CLINIC | Age: 51
End: 2021-08-16
Payer: MEDICAID

## 2021-08-16 VITALS — BODY MASS INDEX: 18.31 KG/M2 | SYSTOLIC BLOOD PRESSURE: 120 MMHG | WEIGHT: 110 LBS | DIASTOLIC BLOOD PRESSURE: 70 MMHG

## 2021-08-16 DIAGNOSIS — D64.9 ANEMIA, UNSPECIFIED: ICD-10-CM

## 2021-08-16 DIAGNOSIS — K46.9 UNSPECIFIED ABDOMINAL HERNIA WITHOUT OBSTRUCTION OR GANGRENE: Chronic | ICD-10-CM

## 2021-08-16 DIAGNOSIS — N95.0 POSTMENOPAUSAL BLEEDING: ICD-10-CM

## 2021-08-16 PROCEDURE — 99214 OFFICE O/P EST MOD 30 MIN: CPT | Mod: 25

## 2021-08-16 PROCEDURE — 99396 PREV VISIT EST AGE 40-64: CPT

## 2021-08-16 PROCEDURE — 58100 BIOPSY OF UTERUS LINING: CPT

## 2021-08-16 NOTE — HISTORY OF PRESENT ILLNESS
[FreeTextEntry1] : Patient here for annual and also c/o persistent vaginal bleeding that just stopped yesterday.\par Patient is known to be anemic - last hemoglobin = 7\par I had recommended to do a sonogram during her last visit but she never did it.\par patient also reports about a 20-30 lbs weight loss. She believes that the weight gain was due to corticosteroid use to do her pulmonary disease.\par  [Patient reported mammogram was normal] : Patient reported mammogram was normal [Patient reported PAP Smear was normal] : Patient reported PAP Smear was normal [Mammogramdate] : 2019 [PapSmeardate] : 2019 [No] : Patient does not have concerns regarding sex

## 2021-08-16 NOTE — PLAN
[FreeTextEntry1] : Annual Exam\par Abnormal Uterine Bleeding / Perimenopausal Bleeding\par Anemia\par Hernia\par H/O elevation of \par \par Pap/hpv done\par EMB done\par Sono -tv/ta\par Hernia - seeing vince in 2 days - will likely need surgical repair. I told her that we may perform concurrent surgery.\par f/u 2 weeks for discussion of results and further management.

## 2021-08-16 NOTE — PHYSICAL EXAM
[Appropriately responsive] : appropriately responsive [Alert] : alert [No Acute Distress] : no acute distress [No Lymphadenopathy] : no lymphadenopathy [Regular Rate Rhythm] : regular rate rhythm [No Murmurs] : no murmurs [Clear to Auscultation B/L] : clear to auscultation bilaterally [Soft] : soft [Non-tender] : non-tender [Non-distended] : non-distended [No HSM] : No HSM [No Lesions] : no lesions [No Mass] : no mass [Oriented x3] : oriented x3 [FreeTextEntry7] : Periumbilical mass - probably hernia. [Examination Of The Breasts] : a normal appearance [No Masses] : no breast masses were palpable [Labia Majora] : normal [Labia Minora] : normal [Normal] : normal [Uterine Adnexae] : normal

## 2021-08-17 DIAGNOSIS — D64.9 ANEMIA, UNSPECIFIED: ICD-10-CM

## 2021-08-17 DIAGNOSIS — K43.2 INCISIONAL HERNIA WITHOUT OBSTRUCTION OR GANGRENE: ICD-10-CM

## 2021-08-17 DIAGNOSIS — N95.0 POSTMENOPAUSAL BLEEDING: ICD-10-CM

## 2021-08-17 DIAGNOSIS — Z01.419 ENCOUNTER FOR GYNECOLOGICAL EXAMINATION (GENERAL) (ROUTINE) WITHOUT ABNORMAL FINDINGS: ICD-10-CM

## 2021-08-18 ENCOUNTER — APPOINTMENT (OUTPATIENT)
Dept: SURGERY | Facility: CLINIC | Age: 51
End: 2021-08-18

## 2021-08-18 LAB — CORE LAB BIOPSY: NORMAL

## 2021-08-23 ENCOUNTER — TRANSCRIPTION ENCOUNTER (OUTPATIENT)
Age: 51
End: 2021-08-23

## 2021-08-23 ENCOUNTER — EMERGENCY (EMERGENCY)
Facility: HOSPITAL | Age: 51
LOS: 0 days | Discharge: HOME | End: 2021-08-23
Attending: EMERGENCY MEDICINE | Admitting: EMERGENCY MEDICINE
Payer: MEDICAID

## 2021-08-23 VITALS
HEIGHT: 65 IN | OXYGEN SATURATION: 98 % | HEART RATE: 83 BPM | TEMPERATURE: 99 F | SYSTOLIC BLOOD PRESSURE: 146 MMHG | DIASTOLIC BLOOD PRESSURE: 91 MMHG | RESPIRATION RATE: 18 BRPM

## 2021-08-23 DIAGNOSIS — K21.9 GASTRO-ESOPHAGEAL REFLUX DISEASE WITHOUT ESOPHAGITIS: ICD-10-CM

## 2021-08-23 DIAGNOSIS — E03.9 HYPOTHYROIDISM, UNSPECIFIED: ICD-10-CM

## 2021-08-23 DIAGNOSIS — J44.9 CHRONIC OBSTRUCTIVE PULMONARY DISEASE, UNSPECIFIED: ICD-10-CM

## 2021-08-23 DIAGNOSIS — E78.00 PURE HYPERCHOLESTEROLEMIA, UNSPECIFIED: ICD-10-CM

## 2021-08-23 DIAGNOSIS — J40 BRONCHITIS, NOT SPECIFIED AS ACUTE OR CHRONIC: ICD-10-CM

## 2021-08-23 DIAGNOSIS — R07.89 OTHER CHEST PAIN: ICD-10-CM

## 2021-08-23 DIAGNOSIS — F17.200 NICOTINE DEPENDENCE, UNSPECIFIED, UNCOMPLICATED: ICD-10-CM

## 2021-08-23 DIAGNOSIS — D64.9 ANEMIA, UNSPECIFIED: ICD-10-CM

## 2021-08-23 DIAGNOSIS — K46.9 UNSPECIFIED ABDOMINAL HERNIA WITHOUT OBSTRUCTION OR GANGRENE: Chronic | ICD-10-CM

## 2021-08-23 LAB
ALBUMIN SERPL ELPH-MCNC: 3.9 G/DL — SIGNIFICANT CHANGE UP (ref 3.5–5.2)
ALP SERPL-CCNC: 50 U/L — SIGNIFICANT CHANGE UP (ref 30–115)
ALT FLD-CCNC: 11 U/L — SIGNIFICANT CHANGE UP (ref 0–41)
ANION GAP SERPL CALC-SCNC: 12 MMOL/L — SIGNIFICANT CHANGE UP (ref 7–14)
ANISOCYTOSIS BLD QL: SLIGHT — SIGNIFICANT CHANGE UP
AST SERPL-CCNC: 32 U/L — SIGNIFICANT CHANGE UP (ref 0–41)
BASOPHILS # BLD AUTO: 0.05 K/UL — SIGNIFICANT CHANGE UP (ref 0–0.2)
BASOPHILS NFR BLD AUTO: 0.9 % — SIGNIFICANT CHANGE UP (ref 0–1)
BILIRUB SERPL-MCNC: 0.3 MG/DL — SIGNIFICANT CHANGE UP (ref 0.2–1.2)
BUN SERPL-MCNC: 6 MG/DL — LOW (ref 10–20)
CALCIUM SERPL-MCNC: 9 MG/DL — SIGNIFICANT CHANGE UP (ref 8.5–10.1)
CHLORIDE SERPL-SCNC: 104 MMOL/L — SIGNIFICANT CHANGE UP (ref 98–110)
CO2 SERPL-SCNC: 23 MMOL/L — SIGNIFICANT CHANGE UP (ref 17–32)
CREAT SERPL-MCNC: 0.7 MG/DL — SIGNIFICANT CHANGE UP (ref 0.7–1.5)
EOSINOPHIL # BLD AUTO: 0.76 K/UL — HIGH (ref 0–0.7)
EOSINOPHIL NFR BLD AUTO: 12.6 % — HIGH (ref 0–8)
GIANT PLATELETS BLD QL SMEAR: PRESENT — SIGNIFICANT CHANGE UP
GLUCOSE SERPL-MCNC: 79 MG/DL — SIGNIFICANT CHANGE UP (ref 70–99)
HCT VFR BLD CALC: 24.8 % — LOW (ref 37–47)
HGB BLD-MCNC: 7.1 G/DL — LOW (ref 12–16)
HYPOCHROMIA BLD QL: SIGNIFICANT CHANGE UP
LYMPHOCYTES # BLD AUTO: 3 K/UL — SIGNIFICANT CHANGE UP (ref 1.2–3.4)
LYMPHOCYTES # BLD AUTO: 49.6 % — SIGNIFICANT CHANGE UP (ref 20.5–51.1)
MACROCYTES BLD QL: SLIGHT — SIGNIFICANT CHANGE UP
MAGNESIUM SERPL-MCNC: 1.8 MG/DL — SIGNIFICANT CHANGE UP (ref 1.8–2.4)
MANUAL SMEAR VERIFICATION: SIGNIFICANT CHANGE UP
MCHC RBC-ENTMCNC: 20.3 PG — LOW (ref 27–31)
MCHC RBC-ENTMCNC: 28.6 G/DL — LOW (ref 32–37)
MCV RBC AUTO: 70.9 FL — LOW (ref 81–99)
MICROCYTES BLD QL: SLIGHT — SIGNIFICANT CHANGE UP
MONOCYTES # BLD AUTO: 0.16 K/UL — SIGNIFICANT CHANGE UP (ref 0.1–0.6)
MONOCYTES NFR BLD AUTO: 2.7 % — SIGNIFICANT CHANGE UP (ref 1.7–9.3)
NEUTROPHILS # BLD AUTO: 2.07 K/UL — SIGNIFICANT CHANGE UP (ref 1.4–6.5)
NEUTROPHILS NFR BLD AUTO: 34.2 % — LOW (ref 42.2–75.2)
PLAT MORPH BLD: NORMAL — SIGNIFICANT CHANGE UP
PLATELET # BLD AUTO: 340 K/UL — SIGNIFICANT CHANGE UP (ref 130–400)
POLYCHROMASIA BLD QL SMEAR: SIGNIFICANT CHANGE UP
POTASSIUM SERPL-MCNC: 4.8 MMOL/L — SIGNIFICANT CHANGE UP (ref 3.5–5)
POTASSIUM SERPL-SCNC: 4.8 MMOL/L — SIGNIFICANT CHANGE UP (ref 3.5–5)
PROT SERPL-MCNC: 7.3 G/DL — SIGNIFICANT CHANGE UP (ref 6–8)
RBC # BLD: 3.5 M/UL — LOW (ref 4.2–5.4)
RBC # FLD: 18.7 % — HIGH (ref 11.5–14.5)
RBC BLD AUTO: ABNORMAL
SODIUM SERPL-SCNC: 139 MMOL/L — SIGNIFICANT CHANGE UP (ref 135–146)
TROPONIN T SERPL-MCNC: <0.01 NG/ML — SIGNIFICANT CHANGE UP
WBC # BLD: 6.05 K/UL — SIGNIFICANT CHANGE UP (ref 4.8–10.8)
WBC # FLD AUTO: 6.05 K/UL — SIGNIFICANT CHANGE UP (ref 4.8–10.8)

## 2021-08-23 PROCEDURE — 71046 X-RAY EXAM CHEST 2 VIEWS: CPT | Mod: 26

## 2021-08-23 PROCEDURE — 99284 EMERGENCY DEPT VISIT MOD MDM: CPT

## 2021-08-23 PROCEDURE — 93010 ELECTROCARDIOGRAM REPORT: CPT

## 2021-08-23 RX ORDER — ACETAMINOPHEN 500 MG
975 TABLET ORAL ONCE
Refills: 0 | Status: COMPLETED | OUTPATIENT
Start: 2021-08-23 | End: 2021-08-23

## 2021-08-23 RX ORDER — ALBUTEROL 90 UG/1
2 AEROSOL, METERED ORAL ONCE
Refills: 0 | Status: COMPLETED | OUTPATIENT
Start: 2021-08-23 | End: 2021-08-23

## 2021-08-23 RX ADMIN — Medication 975 MILLIGRAM(S): at 12:51

## 2021-08-23 RX ADMIN — Medication 125 MILLIGRAM(S): at 12:51

## 2021-08-23 RX ADMIN — ALBUTEROL 2 PUFF(S): 90 AEROSOL, METERED ORAL at 12:51

## 2021-08-23 NOTE — ED PROVIDER NOTE - IV ALTEPLASE EXCL ABS HIDDEN
CALLED PATIENT AND ADVISED OF STUDY RESULTS. PATIENT VERBALIZED UNDERSTANDING AND WAS AGREEABLE TO PAP THERAPY. FAXED ORDER TO JOSE 05/26/21 [facility]   show

## 2021-08-23 NOTE — ED PROVIDER NOTE - ATTENDING CONTRIBUTION TO CARE
49 y/o female h/o COPD, hypothyroid, GERD, anemia, former smoker p/w increased cough x 2 days, persistent, worse at night, associated BL chest discomfort with coughing, denies fever, hemoptysis, orthopnea, PND, exertional chest pain, LE pain or swelling, recent immobilization or travel or other associated complaints at present. Pt reports similar symptoms intermittently which usually resolve with prednisone. [place] [facility] [place] advised to come to ED due to CP and abnormal EKG which showed biphasic t waves leads V1-V2.    Old chart reviewed.  I have reviewed and agree with the initial nursing note, except as documented in my note.    VSS, awake, alert, non-toxic appearing, sitting comfortably, in NAD, oropharynx clear, no skin rash or lesions, no tracheal deviation, non-labored breathing without accessory muscle use apparent or pursed lip breathing, no retractions, speaks full sentences, chest CTAB, reproducible chest wall discomfort, no w/r/r, +S1/S2, RRR, no m/r/g, abdomen soft, NT, ND, +BS, AO x 3, clear speech and steady gait.

## 2021-08-23 NOTE — ED PROVIDER NOTE - OBJECTIVE STATEMENT
51 y/o female with hx COPD, Hypothyroid, Acid reflux, presents to the ED c/o "I have been SOB, cough, chest pain with coughing for the last 2 days. I went to  to get steroids and they sent me here." no leg pain/ leg swelling/ weakness

## 2021-08-23 NOTE — ED PROVIDER NOTE - NSFOLLOWUPINSTRUCTIONS_ED_ALL_ED_FT
Acute Bronchitis    Bronchitis is inflammation of the airways that extend from the windpipe into the lungs (bronchi). The inflammaAcute Bronchitis    Bronchitis is inflammation of the airways that extend from the windpipe into the lungs (bronchi). The inflammation often causes mucus to develop. This leads to a cough, which is the most common symptom of bronchitis.     In acute bronchitis, the condition usually develops suddenly and goes away over time, usually in a couple weeks. Smoking, allergies, and asthma can make bronchitis worse. Repeated episodes of bronchitis may cause further lung problems.     CAUSES  Acute bronchitis is most often caused by the same virus that causes a cold. The virus can spread from person to person (contagious) through coughing, sneezing, and touching contaminated objects.    SIGNS AND SYMPTOMS  Cough.    Fever.    Coughing up mucus.    Body aches.    Chest congestion.    Chills.    Shortness of breath.    Sore throat.      DIAGNOSIS  Acute bronchitis is usually diagnosed through a physical exam. Your health care provider will also ask you questions about your medical history. Tests, such as chest X-rays, are sometimes done to rule out other conditions.     TREATMENT  Acute bronchitis usually goes away in a couple weeks. Oftentimes, no medical treatment is necessary. Medicines are sometimes given for relief of fever or cough. Antibiotic medicines are usually not needed but may be prescribed in certain situations. In some cases, an inhaler may be recommended to help reduce shortness of breath and control the cough. A cool mist vaporizer may also be used to help thin bronchial secretions and make it easier to clear the chest.     HOME CARE INSTRUCTIONS  Get plenty of rest.    Drink enough fluids to keep your urine clear or pale yellow (unless you have a medical condition that requires fluid restriction). Increasing fluids may help thin your respiratory secretions (sputum) and reduce chest congestion, and it will prevent dehydration.    Take medicines only as directed by your health care provider.   If you were prescribed an antibiotic medicine, finish it all even if you start to feel better.   Avoid smoking and secondhand smoke. Exposure to cigarette smoke or irritating chemicals will make bronchitis worse. If you are a smoker, consider using nicotine gum or skin patches to help control withdrawal symptoms. Quitting smoking will help your lungs heal faster.    Reduce the chances of another bout of acute bronchitis by washing your hands frequently, avoiding people with cold symptoms, and trying not to touch your hands to your mouth, nose, or eyes.    Keep all follow-up visits as directed by your health care provider.      SEEK MEDICAL CARE IF:  Your symptoms do not improve after 1 week of treatment.     SEEK IMMEDIATE MEDICAL CARE IF:  You develop an increased fever or chills.    You have chest pain.    You have severe shortness of breath.  You have bloody sputum.     You develop dehydration.  You faint or repeatedly feel like you are going to pass out.  You develop repeated vomiting.  You develop a severe headache.     MAKE SURE YOU:  Understand these instructions.   Will watch your condition.  Will get help right away if you are not doing well or get worse.    ADDITIONAL NOTES AND INSTRUCTIONS    Please follow up with your Primary MD in 24-48 hr.  Seek immediate medical care for any new/worsening signs or symptoms.tion often causes mucus to develop. This leads to a cough, which is the most common symptom of bronchitis.     In acute bronchitis, the condition usually develops suddenly and goes away over time, usually in a couple weeks. Smoking, allergies, and asthma can make bronchitis worse. Repeated episodes of bronchitis may cause further lung problems.     CAUSES  Acute bronchitis is most often caused by the same virus that causes a cold. The virus can spread from person to person (contagious) through coughing, sneezing, and touching contaminated objects.    SIGNS AND SYMPTOMS  Cough.    Fever.    Coughing up mucus.    Body aches.    Chest congestion.    Chills.    Shortness of breath.    Sore throat.      DIAGNOSIS  Acute bronchitis is usually diagnosed through a physical exam. Your health care provider will also ask you questions about your medical history. Tests, such as chest X-rays, are sometimes done to rule out other conditions.     TREATMENT  Acute bronchitis usually goes away in a couple weeks. Oftentimes, no medical treatment is necessary. Medicines are sometimes given for relief of fever or cough. Antibiotic medicines are usually not needed but may be prescribed in certain situations. In some cases, an inhaler may be recommended to help reduce shortness of breath and control the cough. A cool mist vaporizer may also be used to help thin bronchial secretions and make it easier to clear the chest.     HOME CARE INSTRUCTIONS  Get plenty of rest.    Drink enough fluids to keep your urine clear or pale yellow (unless you have a medical condition that requires fluid restriction). Increasing fluids may help thin your respiratory secretions (sputum) and reduce chest congestion, and it will prevent dehydration.    Take medicines only as directed by your health care provider.   If you were prescribed an antibiotic medicine, finish it all even if you start to feel better.   Avoid smoking and secondhand smoke. Exposure to cigarette smoke or irritating chemicals will make bronchitis worse. If you are a smoker, consider using nicotine gum or skin patches to help control withdrawal symptoms. Quitting smoking will help your lungs heal faster.    Reduce the chances of another bout of acute bronchitis by washing your hands frequently, avoiding people with cold symptoms, and trying not to touch your hands to your mouth, nose, or eyes.    Keep all follow-up visits as directed by your health care provider.      SEEK MEDICAL CARE IF:  Your symptoms do not improve after 1 week of treatment.     SEEK IMMEDIATE MEDICAL CARE IF:  You develop an increased fever or chills.    You have chest pain.    You have severe shortness of breath.  You have bloody sputum.     You develop dehydration.  You faint or repeatedly feel like you are going to pass out.  You develop repeated vomiting.  You develop a severe headache.     MAKE SURE YOU:  Understand these instructions.   Will watch your condition.  Will get help right away if you are not doing well or get worse.    ADDITIONAL NOTES AND INSTRUCTIONS    Please follow up with your Primary MD in 24-48 hr.  Seek immediate medical care for any new/worsening signs or symptoms.

## 2021-08-23 NOTE — ED PROVIDER NOTE - PATIENT PORTAL LINK FT
You can access the FollowMyHealth Patient Portal offered by [facility] by registering at the following website: [email or web address]. By joining [employer]’s FollowMyHealth portal, you will also be able to view your health information using other applications (apps) compatible with our system.

## 2021-08-23 NOTE — ED ADULT NURSE NOTE - OBJECTIVE STATEMENT
Pt sent from urgent care for chest pressure and abnormal EKG. Pt c/o cough and chest pressure x 2 days. Denies any other symptoms. HX asthma and COPD

## 2021-08-23 NOTE — ED PROVIDER NOTE - NSICDXPASTMEDICALHX_GEN_ALL_CORE_FT
PAST MEDICAL HISTORY:  Anemia     COPD (chronic obstructive pulmonary disease)     GERD (gastroesophageal reflux disease)     Hyperthyroidism

## 2021-08-23 NOTE — ED ADULT NURSE NOTE - NSIMPLEMENTINTERV_GEN_ALL_ED
Implemented All Universal Safety Interventions:  [place] to call system. Call bell, personal items and telephone within reach. Instruct patient to call for assistance. Room bathroom lighting operational. Non-slip footwear when patient is off stretcher. Physically safe environment: no spills, clutter or unnecessary equipment. Stretcher in lowest position, wheels locked, appropriate side rails in place.

## 2021-08-26 LAB
ALBUMIN SERPL ELPH-MCNC: 3.8 G/DL
ALP BLD-CCNC: 53 U/L
ALT SERPL-CCNC: 14 U/L
ANION GAP SERPL CALC-SCNC: 14 MMOL/L
AST SERPL-CCNC: 18 U/L
BASOPHILS # BLD AUTO: 0.08 K/UL
BASOPHILS NFR BLD AUTO: 1 %
BILIRUB SERPL-MCNC: <0.2 MG/DL
BUN SERPL-MCNC: 6 MG/DL
CALCIUM SERPL-MCNC: 9 MG/DL
CANCER AG125 SERPL-ACNC: 25 U/ML
CEA SERPL-MCNC: 5.6 NG/ML
CHLORIDE SERPL-SCNC: 103 MMOL/L
CO2 SERPL-SCNC: 22 MMOL/L
CREAT SERPL-MCNC: 0.7 MG/DL
EOSINOPHIL # BLD AUTO: 0.53 K/UL
EOSINOPHIL NFR BLD AUTO: 6.7 %
ESTIMATED AVERAGE GLUCOSE: 120 MG/DL
ESTRADIOL SERPL-MCNC: 7 PG/ML
FSH SERPL-MCNC: 45.7 IU/L
GLUCOSE SERPL-MCNC: 132 MG/DL
HBA1C MFR BLD HPLC: 5.8 %
HCT VFR BLD CALC: 25.4 %
HGB BLD-MCNC: 7.2 G/DL
HPV HIGH+LOW RISK DNA PNL CVX: NOT DETECTED
IMM GRANULOCYTES NFR BLD AUTO: 0.4 %
LYMPHOCYTES # BLD AUTO: 1.99 K/UL
LYMPHOCYTES NFR BLD AUTO: 25.2 %
MAN DIFF?: NORMAL
MCHC RBC-ENTMCNC: 20.5 PG
MCHC RBC-ENTMCNC: 28.3 G/DL
MCV RBC AUTO: 72.4 FL
MONOCYTES # BLD AUTO: 0.42 K/UL
MONOCYTES NFR BLD AUTO: 5.3 %
NEUTROPHILS # BLD AUTO: 4.86 K/UL
NEUTROPHILS NFR BLD AUTO: 61.4 %
PLATELET # BLD AUTO: 393 K/UL
POTASSIUM SERPL-SCNC: 3.8 MMOL/L
PROT SERPL-MCNC: 7.5 G/DL
RBC # BLD: 3.51 M/UL
RBC # FLD: 19 %
SODIUM SERPL-SCNC: 139 MMOL/L
T4 FREE SERPL-MCNC: 1.1 NG/DL
TSH SERPL-ACNC: 0.59 UIU/ML
WBC # FLD AUTO: 7.91 K/UL

## 2021-08-27 ENCOUNTER — RESULT REVIEW (OUTPATIENT)
Age: 51
End: 2021-08-27

## 2021-08-27 ENCOUNTER — OUTPATIENT (OUTPATIENT)
Dept: OUTPATIENT SERVICES | Facility: HOSPITAL | Age: 51
LOS: 1 days | Discharge: HOME | End: 2021-08-27
Payer: MEDICAID

## 2021-08-27 DIAGNOSIS — N95.0 POSTMENOPAUSAL BLEEDING: ICD-10-CM

## 2021-08-27 DIAGNOSIS — K46.9 UNSPECIFIED ABDOMINAL HERNIA WITHOUT OBSTRUCTION OR GANGRENE: Chronic | ICD-10-CM

## 2021-08-27 PROCEDURE — 76856 US EXAM PELVIC COMPLETE: CPT | Mod: 26

## 2021-08-27 PROCEDURE — 76830 TRANSVAGINAL US NON-OB: CPT | Mod: 26

## 2021-08-29 LAB — CYTOLOGY CVX/VAG DOC THIN PREP: NORMAL

## 2021-08-30 ENCOUNTER — OUTPATIENT (OUTPATIENT)
Dept: OUTPATIENT SERVICES | Facility: HOSPITAL | Age: 51
LOS: 1 days | Discharge: HOME | End: 2021-08-30

## 2021-08-30 ENCOUNTER — APPOINTMENT (OUTPATIENT)
Dept: OBGYN | Facility: CLINIC | Age: 51
End: 2021-08-30
Payer: MEDICAID

## 2021-08-30 VITALS
SYSTOLIC BLOOD PRESSURE: 130 MMHG | HEIGHT: 65 IN | WEIGHT: 112 LBS | BODY MASS INDEX: 18.66 KG/M2 | DIASTOLIC BLOOD PRESSURE: 72 MMHG

## 2021-08-30 DIAGNOSIS — D50.9 IRON DEFICIENCY ANEMIA, UNSPECIFIED: ICD-10-CM

## 2021-08-30 DIAGNOSIS — K46.9 UNSPECIFIED ABDOMINAL HERNIA WITHOUT OBSTRUCTION OR GANGRENE: Chronic | ICD-10-CM

## 2021-08-30 DIAGNOSIS — N92.0 EXCESSIVE AND FREQUENT MENSTRUATION WITH REGULAR CYCLE: ICD-10-CM

## 2021-08-30 PROCEDURE — 99214 OFFICE O/P EST MOD 30 MIN: CPT

## 2021-08-30 NOTE — PLAN
[FreeTextEntry1] : I counselled patient at length.\par Start Iron 3x/day.\par Hematology consultation - possible IV iron - especially if po iron does not work.\par I offered her continued observation, vs endo ablation, vs hysterectomy.\par I believe that given that her uterus is a bit enlarged with probably adenomyosis - hysterectomy is her best option.\par She would be a good candidate for vaginal hyst or vNOTES.\par Patient to think about it.\par Repeat cbc in 2 weeks.\par

## 2021-08-30 NOTE — HISTORY OF PRESENT ILLNESS
[FreeTextEntry1] : Patient here for results.\par Her EMB is negative.\par Her sono shows a slightly enlarged uterus (?Adenomyosis)\par Her labs shows severe anemia. She is likely perimenopausal.\par Patient c/o feeling a bit weak. She has had a blood transfusion in the past. She has previously been on Iron.\par She has not seen General Surgery yet for the hernia.\par

## 2021-09-22 LAB
BASOPHILS # BLD AUTO: 0.08 K/UL
BASOPHILS NFR BLD AUTO: 1.2 %
EOSINOPHIL # BLD AUTO: 0.68 K/UL
EOSINOPHIL NFR BLD AUTO: 10.4 %
HCT VFR BLD CALC: 28.5 %
HGB BLD-MCNC: 8 G/DL
IMM GRANULOCYTES NFR BLD AUTO: 0.3 %
LYMPHOCYTES # BLD AUTO: 1.31 K/UL
LYMPHOCYTES NFR BLD AUTO: 20 %
MAN DIFF?: NORMAL
MCHC RBC-ENTMCNC: 22.2 PG
MCHC RBC-ENTMCNC: 28.1 G/DL
MCV RBC AUTO: 78.9 FL
MONOCYTES # BLD AUTO: 0.42 K/UL
MONOCYTES NFR BLD AUTO: 6.4 %
NEUTROPHILS # BLD AUTO: 4.04 K/UL
NEUTROPHILS NFR BLD AUTO: 61.7 %
PLATELET # BLD AUTO: 316 K/UL
RBC # BLD: 3.61 M/UL
RBC # FLD: 27.9 %
WBC # FLD AUTO: 6.55 K/UL

## 2021-09-23 ENCOUNTER — TRANSCRIPTION ENCOUNTER (OUTPATIENT)
Age: 51
End: 2021-09-23

## 2021-09-27 ENCOUNTER — NON-APPOINTMENT (OUTPATIENT)
Age: 51
End: 2021-09-27

## 2021-10-06 ENCOUNTER — APPOINTMENT (OUTPATIENT)
Dept: INTERNAL MEDICINE | Facility: CLINIC | Age: 51
End: 2021-10-06

## 2021-10-11 ENCOUNTER — TRANSCRIPTION ENCOUNTER (OUTPATIENT)
Age: 51
End: 2021-10-11

## 2021-10-24 ENCOUNTER — EMERGENCY (EMERGENCY)
Facility: HOSPITAL | Age: 51
LOS: 0 days | Discharge: HOME | End: 2021-10-24
Attending: EMERGENCY MEDICINE | Admitting: EMERGENCY MEDICINE
Payer: MEDICAID

## 2021-10-24 VITALS
HEART RATE: 90 BPM | TEMPERATURE: 98 F | DIASTOLIC BLOOD PRESSURE: 96 MMHG | HEIGHT: 65 IN | OXYGEN SATURATION: 100 % | SYSTOLIC BLOOD PRESSURE: 165 MMHG | RESPIRATION RATE: 20 BRPM

## 2021-10-24 DIAGNOSIS — K21.9 GASTRO-ESOPHAGEAL REFLUX DISEASE WITHOUT ESOPHAGITIS: ICD-10-CM

## 2021-10-24 DIAGNOSIS — Z20.822 CONTACT WITH AND (SUSPECTED) EXPOSURE TO COVID-19: ICD-10-CM

## 2021-10-24 DIAGNOSIS — K46.9 UNSPECIFIED ABDOMINAL HERNIA WITHOUT OBSTRUCTION OR GANGRENE: Chronic | ICD-10-CM

## 2021-10-24 DIAGNOSIS — Z86.2 PERSONAL HISTORY OF DISEASES OF THE BLOOD AND BLOOD-FORMING ORGANS AND CERTAIN DISORDERS INVOLVING THE IMMUNE MECHANISM: ICD-10-CM

## 2021-10-24 DIAGNOSIS — E05.90 THYROTOXICOSIS, UNSPECIFIED WITHOUT THYROTOXIC CRISIS OR STORM: ICD-10-CM

## 2021-10-24 DIAGNOSIS — R05.9 COUGH, UNSPECIFIED: ICD-10-CM

## 2021-10-24 DIAGNOSIS — J44.1 CHRONIC OBSTRUCTIVE PULMONARY DISEASE WITH (ACUTE) EXACERBATION: ICD-10-CM

## 2021-10-24 DIAGNOSIS — R06.02 SHORTNESS OF BREATH: ICD-10-CM

## 2021-10-24 DIAGNOSIS — F17.200 NICOTINE DEPENDENCE, UNSPECIFIED, UNCOMPLICATED: ICD-10-CM

## 2021-10-24 PROCEDURE — 99285 EMERGENCY DEPT VISIT HI MDM: CPT

## 2021-10-24 PROCEDURE — 71046 X-RAY EXAM CHEST 2 VIEWS: CPT | Mod: 26

## 2021-10-24 RX ORDER — FLUTICASONE PROPIONATE AND SALMETEROL 50; 250 UG/1; UG/1
1 POWDER ORAL; RESPIRATORY (INHALATION)
Qty: 30 | Refills: 0
Start: 2021-10-24 | End: 2021-11-22

## 2021-10-24 RX ORDER — IPRATROPIUM/ALBUTEROL SULFATE 18-103MCG
3 AEROSOL WITH ADAPTER (GRAM) INHALATION ONCE
Refills: 0 | Status: COMPLETED | OUTPATIENT
Start: 2021-10-24 | End: 2021-10-24

## 2021-10-24 RX ORDER — IPRATROPIUM/ALBUTEROL SULFATE 18-103MCG
3 AEROSOL WITH ADAPTER (GRAM) INHALATION
Qty: 360 | Refills: 0
Start: 2021-10-24 | End: 2021-11-22

## 2021-10-24 RX ORDER — ALBUTEROL 90 UG/1
2 AEROSOL, METERED ORAL ONCE
Refills: 0 | Status: COMPLETED | OUTPATIENT
Start: 2021-10-24 | End: 2021-10-24

## 2021-10-24 RX ADMIN — Medication 50 MILLIGRAM(S): at 23:45

## 2021-10-24 RX ADMIN — Medication 3 MILLILITER(S): at 22:49

## 2021-10-24 RX ADMIN — Medication 3 MILLILITER(S): at 22:17

## 2021-10-24 RX ADMIN — ALBUTEROL 2 PUFF(S): 90 AEROSOL, METERED ORAL at 23:35

## 2021-10-24 NOTE — ED PROVIDER NOTE - PROVIDER TOKENS
PROVIDER:[TOKEN:[[id number]],FOLLOWUP:[1-3 Days]],PROVIDER:[TOKEN:[[id number]],FOLLOWUP:[1-3 Days]],PROVIDER:[TOKEN:[[id number]],FOLLOWUP:[1-3 Days]]

## 2021-10-24 NOTE — ED PROVIDER NOTE - OBJECTIVE STATEMENT
50 yo F with PMHx of hyperthyroidism, COPD, and GERD presents to the ED c/o non-productive cough and SOB that has been worsening x 2 days. Pt tried using her albuterol inhaler without relief. EMS gave pt 125 mg of Solu-medrol and duonebs with improvement in symptoms. Pt also states she has had not had her steroid inhaler x 1 month. Pt is still smoking. She denies other complaints. Pt denies fever, chills, nausea, vomiting, abdominal pain, diarrhea, headache, dizziness, weakness, chest pain, back pain, LOC, trauma, urinary symptoms, calf pain/swelling, recent travel, recent surgery.

## 2021-10-24 NOTE — ED PROVIDER NOTE - PROGRESS NOTE DETAILS
Pt reports improvement in symptoms. Pt understands to follow-up with PMD/pulm. Pt understands to return to ED if symptoms return/worsen. Agreeable to discharge.

## 2021-10-24 NOTE — ED PROVIDER NOTE - CARE PROVIDER_API CALL
Louis Dinh)  Internal Medicine  [address], [address] [place] [address]  Phone: [phone]  Fax: [phone]  Follow Up Time: 1-3 Days    Abel Ramos)  Critical Care Medicine; Internal Medicine; Pulmonary Disease; Sleep Medicine  [address] [place] [address]  Phone: [phone]  Fax: [phone]  Follow Up Time: 1-3 Days    Virgilio Alexander)  Critical Care Medicine; Internal Medicine; Pulmonary Disease  [address], [address]  [place] [address]  Phone: [phone]  Fax: [phone]  Follow Up Time: 1-3 Days

## 2021-10-24 NOTE — ED ADULT NURSE NOTE - NSIMPLEMENTINTERV_GEN_ALL_ED
Implemented All Universal Safety Interventions:  [place] to call system. Call bell, personal items and telephone within reach. Instruct patient to call for assistance. Room bathroom lighting operational. Non-slip footwear when patient is off stretcher. Physically safe environment: no spills, clutter or unnecessary equipment. Stretcher in lowest position, wheels locked, appropriate side rails in place.

## 2021-10-24 NOTE — ED ADULT TRIAGE NOTE - CHIEF COMPLAINT QUOTE
hx of asthma and COPD, c/o cough x 2 days. received combivent and IV solumedrol from EMS. reports feeling better. denies any fever

## 2021-10-24 NOTE — ED PROVIDER NOTE - PATIENT PORTAL LINK FT
You can access the FollowMyHealth Patient Portal offered by [facility] by registering at the following website: [email or web address]. By joining [facility]’s FollowMyHealth portal, you will also be able to view your health information using other applications (apps) compatible with our system.

## 2021-10-24 NOTE — ED PROVIDER NOTE - CLINICAL SUMMARY MEDICAL DECISION MAKING FREE TEXT BOX
50 yo F, hx of COPD, current smoker here for assessment of non productive cough, wheezing x 2 days not improved with inhaler at home. No fever, CP, colored sputum.    Received solumedrol and combivent en route with good improvement in symptoms even prior to our assessment,    VS normal, not hypoxic or tachypenic.    Trace wheezing, no flaring, no distress, RRR, no LE edema, soft, NT, ND abdomen.    XR negative.     Received additional duoneb and was observed.    No rebound wheezing. Likely mild COPD exacerbation without status asthmaticus. Will dc with steroid burst, continued nebs and follow up/return precautions.

## 2021-10-24 NOTE — ED ADULT NURSE NOTE - OBJECTIVE STATEMENT
Pt with complaints of shortness of breath since yesterday and today started coughing. Solumedrol IV and nebs treatment given by EMS. Pt claimed feels much better.

## 2021-10-24 NOTE — ED PROVIDER NOTE - NS ED ROS FT
Review of Systems  Constitutional:  No fever, chills.  Eyes:  No visual changes, eye pain, or discharge.  ENMT:  No hearing changes, pain, or discharge. No nasal congestion, discharge, or bleeding. No throat pain, swelling, or difficulty swallowing.  Cardiac:  No chest pain, palpitations, syncope, or edema.  Respiratory:  (+) dyspnea, cough. No hemoptysis.  GI:  No nausea, vomiting, diarrhea, or abdominal pain.   :  No dysuria, hematuria, frequency, or burning.   MS:  No back pain.  Skin:  No skin rash, pruritis, jaundice, or lesions.  Neuro:  No headache, dizziness, loss of sensation, or focal weakness.  No change in mental status.

## 2021-10-24 NOTE — ED PROVIDER NOTE - CARE PROVIDERS DIRECT ADDRESSES
[email or web address],DirectAddress_Unknown

## 2021-10-24 NOTE — ED PROVIDER NOTE - PHYSICAL EXAMINATION
VITAL SIGNS: I have reviewed nursing notes and confirm.  CONSTITUTIONAL: Well-developed; well-nourished; in no acute distress.  SKIN: Skin exam is warm and dry, no acute rash.  HEAD: Normocephalic; atraumatic.  EYES: Conjunctiva and sclera clear.  ENT: No nasal discharge; airway clear.  CARD: S1, S2 normal; no murmurs, gallops, or rubs. Regular rate and rhythm.  RESP: No rales or rhonchi. Speaking in full sentences. (+) mild wheezing B/L  EXT: Normal ROM. No clubbing, cyanosis or edema.  NEURO: Alert, oriented. Grossly unremarkable. No focal deficits.

## 2021-10-25 LAB — SARS-COV-2 RNA SPEC QL NAA+PROBE: SIGNIFICANT CHANGE UP

## 2021-11-09 ENCOUNTER — APPOINTMENT (OUTPATIENT)
Dept: INTERNAL MEDICINE | Facility: CLINIC | Age: 51
End: 2021-11-09
Payer: MEDICAID

## 2021-11-09 ENCOUNTER — OUTPATIENT (OUTPATIENT)
Dept: OUTPATIENT SERVICES | Facility: HOSPITAL | Age: 51
LOS: 1 days | Discharge: HOME | End: 2021-11-09

## 2021-11-09 ENCOUNTER — NON-APPOINTMENT (OUTPATIENT)
Age: 51
End: 2021-11-09

## 2021-11-09 VITALS
HEIGHT: 65 IN | WEIGHT: 124 LBS | OXYGEN SATURATION: 96 % | HEART RATE: 106 BPM | SYSTOLIC BLOOD PRESSURE: 121 MMHG | DIASTOLIC BLOOD PRESSURE: 73 MMHG | TEMPERATURE: 97.6 F | BODY MASS INDEX: 20.66 KG/M2

## 2021-11-09 DIAGNOSIS — K46.9 UNSPECIFIED ABDOMINAL HERNIA WITHOUT OBSTRUCTION OR GANGRENE: Chronic | ICD-10-CM

## 2021-11-09 PROCEDURE — 99214 OFFICE O/P EST MOD 30 MIN: CPT | Mod: GC

## 2021-11-09 NOTE — ASSESSMENT
[FreeTextEntry1] : 50 y/o old female pt with past medical hx of COPD/asthma on treatment, hyperthyroidism on methimazole, microcytic anemia, cervical polyp s/p resection, recurrent umbilical hernia, came for follow up. \par \par # COPD/former smoker\par c/o cough and SOB\par cw nebs/albuterol and symbicort\par \par # Microcytic anemia\par last hb 8.0\par fu iron studies\par fu obgyn for hysterectomy\par GI request for pulm clearance for colonoscopy\par advise to get blood work done after appointment\par \par # Vitamin D insufficiency\par c/w vitamin D supplements\par \par # GERD\par cw PPI\par pt advised on dietary and lifestyle modification\par \par \par # Hyperthyroidism\par cw methimazole\par TSH wnl\par \par \par #HCM\par medication refilled today\par lab work ordered\par pulm fu for clearance for colonoscopy per GI\par fu in 6 months and prn

## 2021-11-09 NOTE — PHYSICAL EXAM
[No Respiratory Distress] : no respiratory distress  [No Accessory Muscle Use] : no accessory muscle use [Non Tender] : non-tender [Non-distended] : non-distended [Normal] : no joint swelling and grossly normal strength and tone [de-identified] : non reducible umbilical hernia

## 2021-11-09 NOTE — HISTORY OF PRESENT ILLNESS
[FreeTextEntry1] : follow up  [de-identified] : 50 y/o old female pt with past medical hx of COPD/asthma on treatment, hyperthyroidism on methimazole, microcytic anemia, cervical polyp s/p resection, recurrent umbilical hernia, came for follow up.\par \par \par

## 2021-11-10 DIAGNOSIS — J44.9 CHRONIC OBSTRUCTIVE PULMONARY DISEASE, UNSPECIFIED: ICD-10-CM

## 2021-11-10 DIAGNOSIS — D50.9 IRON DEFICIENCY ANEMIA, UNSPECIFIED: ICD-10-CM

## 2021-11-10 DIAGNOSIS — Z00.00 ENCOUNTER FOR GENERAL ADULT MEDICAL EXAMINATION WITHOUT ABNORMAL FINDINGS: ICD-10-CM

## 2021-12-13 ENCOUNTER — APPOINTMENT (OUTPATIENT)
Dept: INTERNAL MEDICINE | Facility: CLINIC | Age: 51
End: 2021-12-13

## 2021-12-29 ENCOUNTER — NON-APPOINTMENT (OUTPATIENT)
Age: 51
End: 2021-12-29

## 2022-01-27 NOTE — ED ADULT NURSE NOTE - PRO INTERPRETER NEED 2
English Cephalexin Counseling: I counseled the patient regarding use of cephalexin as an antibiotic for prophylactic and/or therapeutic purposes. Cephalexin (commonly prescribed under brand name Keflex) is a cephalosporin antibiotic which is active against numerous classes of bacteria, including most skin bacteria. Side effects may include nausea, diarrhea, gastrointestinal upset, rash, hives, yeast infections, and in rare cases, hepatitis, kidney disease, seizures, fever, confusion, neurologic symptoms, and others. Patients with severe allergies to penicillin medications are cautioned that there is about a 10% incidence of cross-reactivity with cephalosporins. When possible, patients with penicillin allergies should use alternatives to cephalosporins for antibiotic therapy.

## 2022-02-16 NOTE — H&P ADULT - HISTORY OF PRESENT ILLNESS
49 yo F with hx of COPD (not on home O2) ,active smoker GERD ,Hyperthyroidism ,anemia presented to ER  with shortness of breath and cough x 3 days with whitish sputum along with wheezing ,which worsened over night .  pt has recently completed po steroids course 2 week ago, had multiple admission and urgent  care visits for COPD ex since august,18.  today she also reported nasal congestion and running nose ,no recent travel or sick contact. No fever ,chills or any other complaints ,also got flue shot this yr.   Pt was given decadron and Combivent by ems with improvement.   No , leg swelling, fever, chills, abdominal pain, nausea, vomiting, diarrhea, back pain, urinary symptoms, headache, dizziness, paresthesias, or weakness.    In ER ,she was vitally stable  saturating  99% at room air but tachypneic   was given duoneb, levofloxacin and IV NS 1 L  admitted for COPD exacerbation 0.5

## 2022-03-05 ENCOUNTER — INPATIENT (INPATIENT)
Facility: HOSPITAL | Age: 52
LOS: 1 days | Discharge: HOME | End: 2022-03-07
Attending: HOSPITALIST | Admitting: HOSPITALIST
Payer: MEDICAID

## 2022-03-05 VITALS
HEIGHT: 65 IN | WEIGHT: 121.25 LBS | HEART RATE: 78 BPM | RESPIRATION RATE: 24 BRPM | OXYGEN SATURATION: 97 % | TEMPERATURE: 98 F | SYSTOLIC BLOOD PRESSURE: 143 MMHG | DIASTOLIC BLOOD PRESSURE: 98 MMHG

## 2022-03-05 DIAGNOSIS — K46.9 UNSPECIFIED ABDOMINAL HERNIA WITHOUT OBSTRUCTION OR GANGRENE: Chronic | ICD-10-CM

## 2022-03-05 LAB
ALBUMIN SERPL ELPH-MCNC: 4.3 G/DL — SIGNIFICANT CHANGE UP (ref 3.5–5.2)
ALP SERPL-CCNC: 60 U/L — SIGNIFICANT CHANGE UP (ref 30–115)
ALT FLD-CCNC: 14 U/L — SIGNIFICANT CHANGE UP (ref 0–41)
ANION GAP SERPL CALC-SCNC: 11 MMOL/L — SIGNIFICANT CHANGE UP (ref 7–14)
AST SERPL-CCNC: 21 U/L — SIGNIFICANT CHANGE UP (ref 0–41)
BASE EXCESS BLDV CALC-SCNC: -0.2 MMOL/L — SIGNIFICANT CHANGE UP (ref -2–3)
BASOPHILS # BLD AUTO: 0.04 K/UL — SIGNIFICANT CHANGE UP (ref 0–0.2)
BASOPHILS NFR BLD AUTO: 0.9 % — SIGNIFICANT CHANGE UP (ref 0–1)
BILIRUB SERPL-MCNC: 0.4 MG/DL — SIGNIFICANT CHANGE UP (ref 0.2–1.2)
BUN SERPL-MCNC: 7 MG/DL — LOW (ref 10–20)
CA-I SERPL-SCNC: 1.08 MMOL/L — LOW (ref 1.15–1.33)
CALCIUM SERPL-MCNC: 9.4 MG/DL — SIGNIFICANT CHANGE UP (ref 8.5–10.1)
CHLORIDE SERPL-SCNC: 103 MMOL/L — SIGNIFICANT CHANGE UP (ref 98–110)
CO2 SERPL-SCNC: 23 MMOL/L — SIGNIFICANT CHANGE UP (ref 17–32)
CREAT SERPL-MCNC: 0.8 MG/DL — SIGNIFICANT CHANGE UP (ref 0.7–1.5)
EGFR: 88 ML/MIN/1.73M2 — SIGNIFICANT CHANGE UP
EOSINOPHIL # BLD AUTO: 0.35 K/UL — SIGNIFICANT CHANGE UP (ref 0–0.7)
EOSINOPHIL NFR BLD AUTO: 7.6 % — SIGNIFICANT CHANGE UP (ref 0–8)
GAS PNL BLDV: 135 MMOL/L — LOW (ref 136–145)
GAS PNL BLDV: SIGNIFICANT CHANGE UP
GLUCOSE SERPL-MCNC: 102 MG/DL — HIGH (ref 70–99)
HCO3 BLDV-SCNC: 26 MMOL/L — SIGNIFICANT CHANGE UP (ref 22–29)
HCT VFR BLD CALC: 40.6 % — SIGNIFICANT CHANGE UP (ref 37–47)
HCT VFR BLDA CALC: 65 % — CRITICAL HIGH (ref 39–51)
HGB BLD CALC-MCNC: 21.8 G/DL — CRITICAL HIGH (ref 12.6–17.4)
HGB BLD-MCNC: 13.3 G/DL — SIGNIFICANT CHANGE UP (ref 12–16)
IMM GRANULOCYTES NFR BLD AUTO: 0.2 % — SIGNIFICANT CHANGE UP (ref 0.1–0.3)
LACTATE BLDV-MCNC: 1 MMOL/L — SIGNIFICANT CHANGE UP (ref 0.5–2)
LYMPHOCYTES # BLD AUTO: 1.65 K/UL — SIGNIFICANT CHANGE UP (ref 1.2–3.4)
LYMPHOCYTES # BLD AUTO: 35.9 % — SIGNIFICANT CHANGE UP (ref 20.5–51.1)
MCHC RBC-ENTMCNC: 32.2 PG — HIGH (ref 27–31)
MCHC RBC-ENTMCNC: 32.8 G/DL — SIGNIFICANT CHANGE UP (ref 32–37)
MCV RBC AUTO: 98.3 FL — SIGNIFICANT CHANGE UP (ref 81–99)
MONOCYTES # BLD AUTO: 0.38 K/UL — SIGNIFICANT CHANGE UP (ref 0.1–0.6)
MONOCYTES NFR BLD AUTO: 8.3 % — SIGNIFICANT CHANGE UP (ref 1.7–9.3)
NEUTROPHILS # BLD AUTO: 2.17 K/UL — SIGNIFICANT CHANGE UP (ref 1.4–6.5)
NEUTROPHILS NFR BLD AUTO: 47.1 % — SIGNIFICANT CHANGE UP (ref 42.2–75.2)
NRBC # BLD: 0 /100 WBCS — SIGNIFICANT CHANGE UP (ref 0–0)
NT-PROBNP SERPL-SCNC: 111 PG/ML — SIGNIFICANT CHANGE UP (ref 0–300)
PCO2 BLDV: 46 MMHG — HIGH (ref 39–42)
PH BLDV: 7.36 — SIGNIFICANT CHANGE UP (ref 7.32–7.43)
PLATELET # BLD AUTO: 268 K/UL — SIGNIFICANT CHANGE UP (ref 130–400)
PO2 BLDV: 62 MMHG — SIGNIFICANT CHANGE UP
POTASSIUM BLDV-SCNC: 3.8 MMOL/L — SIGNIFICANT CHANGE UP (ref 3.5–5.1)
POTASSIUM SERPL-MCNC: 4.2 MMOL/L — SIGNIFICANT CHANGE UP (ref 3.5–5)
POTASSIUM SERPL-SCNC: 4.2 MMOL/L — SIGNIFICANT CHANGE UP (ref 3.5–5)
PROT SERPL-MCNC: 7.3 G/DL — SIGNIFICANT CHANGE UP (ref 6–8)
RAPID RVP RESULT: SIGNIFICANT CHANGE UP
RBC # BLD: 4.13 M/UL — LOW (ref 4.2–5.4)
RBC # FLD: 12.5 % — SIGNIFICANT CHANGE UP (ref 11.5–14.5)
SAO2 % BLDV: 91.9 % — SIGNIFICANT CHANGE UP
SARS-COV-2 RNA SPEC QL NAA+PROBE: SIGNIFICANT CHANGE UP
SODIUM SERPL-SCNC: 137 MMOL/L — SIGNIFICANT CHANGE UP (ref 135–146)
TROPONIN T SERPL-MCNC: <0.01 NG/ML — SIGNIFICANT CHANGE UP
WBC # BLD: 4.6 K/UL — LOW (ref 4.8–10.8)
WBC # FLD AUTO: 4.6 K/UL — LOW (ref 4.8–10.8)

## 2022-03-05 PROCEDURE — 71045 X-RAY EXAM CHEST 1 VIEW: CPT | Mod: 26

## 2022-03-05 PROCEDURE — 93010 ELECTROCARDIOGRAM REPORT: CPT

## 2022-03-05 PROCEDURE — 99223 1ST HOSP IP/OBS HIGH 75: CPT

## 2022-03-05 PROCEDURE — 99285 EMERGENCY DEPT VISIT HI MDM: CPT

## 2022-03-05 PROCEDURE — 99407 BEHAV CHNG SMOKING > 10 MIN: CPT

## 2022-03-05 RX ORDER — BUDESONIDE AND FORMOTEROL FUMARATE DIHYDRATE 160; 4.5 UG/1; UG/1
2 AEROSOL RESPIRATORY (INHALATION)
Refills: 0 | Status: DISCONTINUED | OUTPATIENT
Start: 2022-03-05 | End: 2022-03-07

## 2022-03-05 RX ORDER — AZITHROMYCIN 500 MG/1
TABLET, FILM COATED ORAL
Refills: 0 | Status: DISCONTINUED | OUTPATIENT
Start: 2022-03-05 | End: 2022-03-07

## 2022-03-05 RX ORDER — INFLUENZA VIRUS VACCINE 15; 15; 15; 15 UG/.5ML; UG/.5ML; UG/.5ML; UG/.5ML
0.5 SUSPENSION INTRAMUSCULAR ONCE
Refills: 0 | Status: DISCONTINUED | OUTPATIENT
Start: 2022-03-05 | End: 2022-03-07

## 2022-03-05 RX ORDER — AZITHROMYCIN 500 MG/1
500 TABLET, FILM COATED ORAL EVERY 24 HOURS
Refills: 0 | Status: DISCONTINUED | OUTPATIENT
Start: 2022-03-06 | End: 2022-03-07

## 2022-03-05 RX ORDER — MONTELUKAST 4 MG/1
10 TABLET, CHEWABLE ORAL DAILY
Refills: 0 | Status: DISCONTINUED | OUTPATIENT
Start: 2022-03-05 | End: 2022-03-07

## 2022-03-05 RX ORDER — ALBUTEROL 90 UG/1
2 AEROSOL, METERED ORAL EVERY 6 HOURS
Refills: 0 | Status: DISCONTINUED | OUTPATIENT
Start: 2022-03-05 | End: 2022-03-07

## 2022-03-05 RX ORDER — METHIMAZOLE 10 MG/1
1 TABLET ORAL
Qty: 0 | Refills: 0 | DISCHARGE

## 2022-03-05 RX ORDER — CHLORHEXIDINE GLUCONATE 213 G/1000ML
1 SOLUTION TOPICAL
Refills: 0 | Status: DISCONTINUED | OUTPATIENT
Start: 2022-03-05 | End: 2022-03-07

## 2022-03-05 RX ORDER — IPRATROPIUM/ALBUTEROL SULFATE 18-103MCG
3 AEROSOL WITH ADAPTER (GRAM) INHALATION
Refills: 0 | Status: COMPLETED | OUTPATIENT
Start: 2022-03-05 | End: 2022-03-05

## 2022-03-05 RX ORDER — PANTOPRAZOLE SODIUM 20 MG/1
40 TABLET, DELAYED RELEASE ORAL
Refills: 0 | Status: DISCONTINUED | OUTPATIENT
Start: 2022-03-05 | End: 2022-03-07

## 2022-03-05 RX ORDER — IPRATROPIUM/ALBUTEROL SULFATE 18-103MCG
3 AEROSOL WITH ADAPTER (GRAM) INHALATION EVERY 4 HOURS
Refills: 0 | Status: DISCONTINUED | OUTPATIENT
Start: 2022-03-05 | End: 2022-03-07

## 2022-03-05 RX ORDER — UMECLIDINIUM 62.5 UG/1
2 AEROSOL, POWDER ORAL
Qty: 0 | Refills: 0 | DISCHARGE

## 2022-03-05 RX ORDER — ENOXAPARIN SODIUM 100 MG/ML
40 INJECTION SUBCUTANEOUS EVERY 24 HOURS
Refills: 0 | Status: DISCONTINUED | OUTPATIENT
Start: 2022-03-05 | End: 2022-03-07

## 2022-03-05 RX ORDER — AZITHROMYCIN 500 MG/1
500 TABLET, FILM COATED ORAL ONCE
Refills: 0 | Status: COMPLETED | OUTPATIENT
Start: 2022-03-05 | End: 2022-03-05

## 2022-03-05 RX ORDER — PANTOPRAZOLE SODIUM 20 MG/1
1 TABLET, DELAYED RELEASE ORAL
Qty: 0 | Refills: 0 | DISCHARGE

## 2022-03-05 RX ORDER — FLUTICASONE PROPIONATE 50 MCG
1 SPRAY, SUSPENSION NASAL
Refills: 0 | Status: DISCONTINUED | OUTPATIENT
Start: 2022-03-05 | End: 2022-03-07

## 2022-03-05 RX ADMIN — PANTOPRAZOLE SODIUM 40 MILLIGRAM(S): 20 TABLET, DELAYED RELEASE ORAL at 23:20

## 2022-03-05 RX ADMIN — Medication 3 MILLILITER(S): at 23:20

## 2022-03-05 RX ADMIN — MONTELUKAST 10 MILLIGRAM(S): 4 TABLET, CHEWABLE ORAL at 23:15

## 2022-03-05 RX ADMIN — AZITHROMYCIN 255 MILLIGRAM(S): 500 TABLET, FILM COATED ORAL at 17:30

## 2022-03-05 RX ADMIN — ENOXAPARIN SODIUM 40 MILLIGRAM(S): 100 INJECTION SUBCUTANEOUS at 23:16

## 2022-03-05 RX ADMIN — Medication 3 MILLILITER(S): at 10:15

## 2022-03-05 RX ADMIN — Medication 125 MILLIGRAM(S): at 10:09

## 2022-03-05 RX ADMIN — Medication 1 SPRAY(S): at 17:29

## 2022-03-05 RX ADMIN — Medication 3 MILLILITER(S): at 10:09

## 2022-03-05 RX ADMIN — Medication 3 MILLILITER(S): at 10:00

## 2022-03-05 RX ADMIN — Medication 40 MILLIGRAM(S): at 23:29

## 2022-03-05 RX ADMIN — Medication 3 MILLILITER(S): at 17:30

## 2022-03-05 RX ADMIN — BUDESONIDE AND FORMOTEROL FUMARATE DIHYDRATE 2 PUFF(S): 160; 4.5 AEROSOL RESPIRATORY (INHALATION) at 23:14

## 2022-03-05 NOTE — ED PROVIDER NOTE - OBJECTIVE STATEMENT
51Y F with PMH of Hyperthyroidism, COPD (not on O2, never intubated), GERD presents with CC of SOB for two days duration. Patient reports that she started to become SOB, worse on exertion, with chest tightness, associated with runny nose and congestion, cough with non-purulent clear sputum. Current active smoker. Says feels like her COPD exacerbation, used to be on prednisone weekly regimen however discontinued for three months. Denies fevers, chills, N/V, abdominal pain, urinary complaints.

## 2022-03-05 NOTE — H&P ADULT - NSHPLABSRESULTS_GEN_ALL_CORE
13.3   4.60  )-----------( 268      ( 05 Mar 2022 08:58 )             40.6       03-05    137  |  103  |  7<L>  ----------------------------<  102<H>  4.2   |  23  |  0.8    Ca    9.4      05 Mar 2022 08:58    TPro  7.3  /  Alb  4.3  /  TBili  0.4  /  DBili  x   /  AST  21  /  ALT  14  /  AlkPhos  60  03-05        CARDIAC MARKERS ( 05 Mar 2022 08:58 )  x     / <0.01 ng/mL / x     / x     / x        Xray Chest 1 View AP/PA (03.05.22 @ 09:16)  Impression:  No focal pulmonary consolidation    12 Lead ECG (03.05.22 @ 08:32)  Ventricular Rate 90 BPM  Atrial Rate 90 BPM  P-R Interval 168 ms  QRS Duration 84 ms  Q-T Interval 388 ms  QTC Calculation(Bazett) 474 ms  P Axis 64 degrees  R Axis 70 degrees  T Axis 60 degrees    Diagnosis Line Normal sinus rhythmwith sinus arrhythmia  Minimal voltage criteria for LVH, may be normal variant ( Sokolow-Alatorre )  Borderline ECG

## 2022-03-05 NOTE — ED ADULT TRIAGE NOTE - CHIEF COMPLAINT QUOTE
patient has hx of copd not on home 02 - reports sob x 3 days using home medications without relief. reports cough with clear sputum no fever at home. patient still current smoker

## 2022-03-05 NOTE — H&P ADULT - NSHPPHYSICALEXAM_GEN_ALL_CORE
T(C): 36.9 (03-05-22 @ 08:37), Max: 36.9 (03-05-22 @ 08:37)  HR: 78 (03-05-22 @ 08:37) (78 - 78)  BP: 143/98 (03-05-22 @ 08:37) (143/98 - 143/98)  RR: 24 (03-05-22 @ 08:37) (24 - 24)  SpO2: 97% (03-05-22 @ 08:37) (97% - 97%)    PHYSICAL EXAM:  GENERAL: NAD, well-developed, think appearing  HEAD:  Atraumatic, Normocephalic  EYES: EOMI, PERRLA, conjunctiva and sclera clear, mild exophthalmos noted   ENT:No nasal obstruction or discharge. No tonsillar exudate, swelling or erythema.  NECK: Supple, No JVD  CHEST/LUNG: Diminished bilaterally with prolonged expiratory phase   HEART: Regular rate and rhythm; No murmurs, rubs, or gallops  ABDOMEN: Soft, Nontender, Nondistended; Bowel sounds present  EXTREMITIES:  2+ Peripheral Pulses, No clubbing, cyanosis, or edema  PSYCH: AAOx3  NEUROLOGY: non-focal, sensation equal and intact bilaterally, cranial nerves ii-xii grossly intact bilaterally   SKIN: No rashes or lesions

## 2022-03-05 NOTE — ED PROVIDER NOTE - CLINICAL SUMMARY MEDICAL DECISION MAKING FREE TEXT BOX
ATTENDING NOTE:  50 y/o F with PMH COPD not on home O2, hyperthyroidism, p/w cough, productive of clear sputum, SOB and wheezing x 3 days. No fever, leg swelling or CP. Pt walked 20 min to bring self to ED. Used to take chronic steroids but stopped secondary to side effects. Pulmonologist Dr. Bojorquez, has not seen in years. PE: GEN: NAD; HEAD: NCAT; ENT: MMM; NECK: supple; CARDIO: RRR; PULM: Diffuse wheezing b/l, speaks full sentences, RR 24, mild increase WOB.; ABD: s/nt; MSK: no pedal edema; NEURO: grossly non focal; PSYCHE: cooperative   Impression: COPD exacerbation, plan for steroids, nebs, labs, EKG, CXR, reassess ATTENDING NOTE:  52 y/o F with PMH COPD not on home O2, hyperthyroidism, p/w cough, productive of clear sputum, SOB and wheezing x 3 days. No fever, leg swelling or CP. Pt walked 20 min to bring self to ED. Used to take chronic steroids but stopped secondary to side effects. Pulmonologist Dr. Bojorquez, has not seen in years.     PE: GEN: NAD; HEAD: NCAT; ENT: MMM; NECK: supple; CARDIO: RRR; PULM: Diffuse wheezing b/l, speaks short sentences, RR 24, +increased WOB.; ABD: s/nt; MSK: no pedal edema; NEURO: grossly non focal; PSYCHE: cooperative     Impression: COPD exacerbation, plan for steroids, nebs, labs, EKG, CXR, reassess    Pt felt better after nebs.  Walked in ED and became markedly short of breath. could only speak short sentences. + increased WOB, + lightheadedness. O2 remained 100% RA    Pt observed longer, on reassessment, pt had similar sx w/ exertion.  Pt does not feel safe for dc, and states does not have f/up.    Pt admitted medicine for COPD exacerbation.    .

## 2022-03-05 NOTE — PATIENT PROFILE ADULT - NSPROPATIENTLACTATING_GEN_A_NUR
EMERGENCY DEPARTMENT HISTORY AND PHYSICAL EXAM    9:03 AM      Date: 5/28/2018  Patient Name: Anum Fallon    History of Presenting Illness     Chief Complaint   Patient presents with    Sore Throat    Weight Loss         History Provided By: Patient    Chief Complaint: presents today with concerns of unintentional weight loss over the past 6 months. Lost approx 30 lbs. Has 30 pkk yr smoking hx and had colonoscopy done 3 yrs ago \" they told me every thing was ok\"   Currently denies of any chest pain, SOB,palpitain, n/v abd pain, diarrhea, known thyroid issues or diabetes. Has also had mild throat irritation but no pain, diff swallowing or sinus congestion. Duration:  several months. Timing:  Gradual  Location: as noted above  Quality: no pain   Severity: N/A  Modifying Factors: none   Associated Symptoms: denies any other associated signs or symptoms      Additional History (Context): Anum Fallon is a 44 y.o. male with as noted in HPI who presents with sx as noted above. PCP: None        Past History     Past Medical History:  History reviewed. No pertinent past medical history. Past Surgical History:  History reviewed. No pertinent surgical history. Family History:  History reviewed. No pertinent family history. Social History:  Social History   Substance Use Topics    Smoking status: Former Smoker    Smokeless tobacco: Never Used    Alcohol use Yes       Allergies:  No Known Allergies      Review of Systems       Review of Systems   Constitutional: Positive for unexpected weight change. Negative for fever. Gastrointestinal: Negative for abdominal pain, diarrhea, nausea and vomiting. All other systems reviewed and are negative.         Physical Exam     Visit Vitals    BP (!) 146/91    Pulse 96    Temp 98.4 °F (36.9 °C)    Resp 16    Ht 5' 3\" (1.6 m)    Wt 80.7 kg (178 lb)    SpO2 100%    BMI 31.53 kg/m2         Physical Exam   Constitutional: He is oriented to person, place, and time. He appears well-developed and well-nourished. No distress. HENT:   Head: Normocephalic and atraumatic. Right Ear: External ear normal.   Left Ear: External ear normal.   Nose: Nose normal.   Mouth/Throat: Oropharynx is clear and moist.   Eyes: Conjunctivae and EOM are normal. Pupils are equal, round, and reactive to light. Right eye exhibits no discharge. Left eye exhibits no discharge. Neck: Normal range of motion. Cardiovascular: Normal rate, regular rhythm and normal heart sounds. Pulmonary/Chest: Effort normal and breath sounds normal. No respiratory distress. He has no wheezes. He has no rales. He exhibits no tenderness. Abdominal: Soft. Bowel sounds are normal. He exhibits no distension. There is no tenderness. There is no rebound and no guarding. Musculoskeletal: Normal range of motion. Neurological: He is alert and oriented to person, place, and time. Skin: Skin is warm. He is not diaphoretic. Psychiatric: He has a normal mood and affect. His behavior is normal. Judgment and thought content normal.         Diagnostic Study Results     Labs -  Recent Results (from the past 12 hour(s))   CBC WITH AUTOMATED DIFF    Collection Time: 05/28/18  9:15 AM   Result Value Ref Range    WBC 6.1 4.6 - 13.2 K/uL    RBC 5.09 4.70 - 5.50 M/uL    HGB 13.7 13.0 - 16.0 g/dL    HCT 41.9 36.0 - 48.0 %    MCV 82.3 74.0 - 97.0 FL    MCH 26.9 24.0 - 34.0 PG    MCHC 32.7 31.0 - 37.0 g/dL    RDW 14.1 11.6 - 14.5 %    PLATELET 324 781 - 813 K/uL    MPV 9.4 9.2 - 11.8 FL    NEUTROPHILS 46 40 - 73 %    LYMPHOCYTES 45 21 - 52 %    MONOCYTES 7 3 - 10 %    EOSINOPHILS 1 0 - 5 %    BASOPHILS 1 0 - 2 %    ABS. NEUTROPHILS 2.8 1.8 - 8.0 K/UL    ABS. LYMPHOCYTES 2.7 0.9 - 3.6 K/UL    ABS. MONOCYTES 0.4 0.05 - 1.2 K/UL    ABS. EOSINOPHILS 0.1 0.0 - 0.4 K/UL    ABS.  BASOPHILS 0.0 0.0 - 0.06 K/UL    DF AUTOMATED     METABOLIC PANEL, COMPREHENSIVE    Collection Time: 05/28/18  9:15 AM   Result Value Ref Range Sodium 142 136 - 145 mmol/L    Potassium 3.8 3.5 - 5.5 mmol/L    Chloride 108 100 - 108 mmol/L    CO2 26 21 - 32 mmol/L    Anion gap 8 3.0 - 18 mmol/L    Glucose 111 (H) 74 - 99 mg/dL    BUN 12 7.0 - 18 MG/DL    Creatinine 1.06 0.6 - 1.3 MG/DL    BUN/Creatinine ratio 11 (L) 12 - 20      GFR est AA >60 >60 ml/min/1.73m2    GFR est non-AA >60 >60 ml/min/1.73m2    Calcium 9.1 8.5 - 10.1 MG/DL    Bilirubin, total 0.3 0.2 - 1.0 MG/DL    ALT (SGPT) 25 16 - 61 U/L    AST (SGOT) 24 15 - 37 U/L    Alk. phosphatase 58 45 - 117 U/L    Protein, total 7.7 6.4 - 8.2 g/dL    Albumin 4.2 3.4 - 5.0 g/dL    Globulin 3.5 2.0 - 4.0 g/dL    A-G Ratio 1.2 0.8 - 1.7     TSH 3RD GENERATION    Collection Time: 05/28/18  9:15 AM   Result Value Ref Range    TSH 1.64 0.36 - 3.74 uIU/mL     Radiologic Studies -   XR ABD ACUTE W 1 V CHEST    (Results Pending)         Medical Decision Making   I am the first provider for this patient. I reviewed the vital signs, available nursing notes, past medical history, past surgical history, family history and social history. Vital Signs-Reviewed the patient's vital signs. Provider Notes (Medical Decision Making):   CXR and KUB with no acute finding. Official radiology read pending. Discussed case with Dr Esteban Gaytan and labs ordered. Updated patient on the x ray and labs and advised that if radiology read is different , some one will call him back. Referral given to [facility] since patient does not have PCP. Diagnosis     Clinical Impression:   1. Unintended weight loss        Disposition: HOME.      Follow-up Information     Follow up With Details Comments [address] Schedule an appointment as soon as possible for a visit To establish care with primary care provider Corrie   [address] [facility]) [phone]    [facility] EMERGENCY DEPT  As needed, If symptoms worsen [address]  [phone]           Patient's Medications    No medications on file unknown

## 2022-03-05 NOTE — ED ADULT NURSE NOTE - OBJECTIVE STATEMENT
Patient is a 51 year old female complaining of shortness of breath that started this morning, has history of COPD, took albuterol with no relief.

## 2022-03-05 NOTE — H&P ADULT - ATTENDING COMMENTS
51 year old female with PMHx of hyperthyroidism, GERD, COPD not on home O2 who presented to the ED with shortness of breath.    #Shortness of breath  #H/o COPD  #Cough with clear sputum production  c/w Symbicort in place of home Advair. Will convert to the 160mg dose  c/w Duonebs q4 ATC for now; will hold tiotropium given ipratropium in the duoneb  Start Azithromycin 500mg daily for GOLD criteria  Given Solumedrol 125 in the ED; will continue on 40mg IV q8  Continue on Singulair  Pt reports having pulmonologist in the past but has not seen for years - will consult pulmonary      #Hyperthyroidism  c/w Methimazole 5mg PO daily    #GERD  c/w Protonix    #Tobacco Abuse  Counseling and education provided   pt with 15-20 Pack-Year smoking history - reports now having cut down on smoking to 2 cigarettes a day - Pt counseled on smoking cessation and  educated on the risk of further progression of COPD with continued smoking.     Activity: As tolerated  Diet: Regular  DVT ppx: Lovenox  GI ppx: Protonix  Code Status: Full Code  DISPO: Acute

## 2022-03-05 NOTE — H&P ADULT - NSHPREVIEWOFSYSTEMS_GEN_ALL_CORE
General: No fevers, chills, weight changes	  Skin/Breast: No skin rashes or wounds  Ophthalmologic: No blurry vision, double vision, recent changes in vision  ENMT: No difficulty hearing, ringing in ears, nasal discharge, throat pain, difficulty swallowing  Respiratory and Thorax: + coughing, wheezing, shortness of breath, Clear sputum production   Cardiovascular: No chest pain, palpitations  Gastrointestinal: No abdominal pain, constipation, diarrhea, nausea, vomiting  Genitourinary: No dysuria, polyuria, pyuria, hematuria  Musculoskeletal: No muscle aches or joint aches  Neurological: No numbness or tingling  Psychiatric: Regular mood  Hematology/Lymphatics: No easy bruising	  Endocrine: No hot or cold intolerance

## 2022-03-05 NOTE — ED PROVIDER NOTE - PHYSICAL EXAMINATION
VITALS: Reviewed  CONSTITUTIONAL: well developed, well nourished, in no acute distress, speaking in full sentences, nontoxic appearing  SKIN: warm, dry, no rash  HEAD: normocephalic, atraumatic  EYES: PERRL, EOMI, no conjunctival erythema, sclera clear  ENT: patent airway, moist mucous membranes  NECK: supple, no masses  CV:  regular rate, regular rhythm, 2+ radial pulses bilaterally  RESP: diffuse expiratory wheezes, no rales, no rhonchi, normal work of breathing, tachypneic  ABD: soft, nontender, nondistended, no rebound, no guarding  MSK: normal ROM, no cyanosis, no edema  NEURO: alert, oriented x3  PSYCH: cooperative, appropriate

## 2022-03-05 NOTE — SBIRT NOTE ADULT - NSSBIRTUNABLESCR_GEN_A_CORE
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 Patient declined any outpatient referral./Patient refused

## 2022-03-05 NOTE — ED PROVIDER NOTE - NS ED ROS FT
Review of Systems:  CONSTITUTIONAL - No fever  SKIN - No rash  HEMATOLOGIC - No abnormal bleeding or bruising  ENT - No change in hearing, No sore throat, No neck pain, No rhinorrhea, No ear pain  GI - No abdominal pain, No nausea, No vomiting, No diarrhea  - No dysuria, frequency, hematuria  MUSCULOSKELETAL - No joint paint, No swelling, No back pain  NEUROLOGIC - No numbness, No focal weakness, No headache, No dizziness  All other systems negative, unless specified in HPI

## 2022-03-05 NOTE — ED ADULT NURSE NOTE - SUICIDE SCREENING QUESTION 2
Patient presents with urinary frequency that first started 4 days ago.  He was on the road for 4 days so he drank cranberry juice which made symptoms better.  He denies back pain and blood in his urine.  Visit Vitals  /71   Pulse 73   Temp 97.7 °F (36.5 °C) (Tympanic)   Resp 16   Wt 106.6 kg   SpO2 96%   BMI 30.17 kg/m²        No

## 2022-03-05 NOTE — H&P ADULT - HISTORY OF PRESENT ILLNESS
51 year old female with PMHx of hyperthyroidism, GERD, COPD not on home O2 who presented to the ED with shortness of breath. Stated that symptoms started about three days ago when she developed the shortness of breath. The patient reports that she last had an exacerbation around a year or so ago and last hospitalized for an exacerbation a few years ago. States that she has never been intubated for a COPD exacerbation. The patient endorses shortness of breath with chest tightness, and a cough productive of clear sputum. Of note is an active smoker; but in process of decreasing her smoking. Reports exacerbations are triggered by weather changes    In the ED initial vitals: /98, HR 78, Sat 97% on room air, T 98.4. CXR unremarkable and labs overall unremarkbale. EKG noted Sinus with sinus arrythmia. Given nebs, solumedrol full dose and admitted to medicine.

## 2022-03-05 NOTE — H&P ADULT - NSHPSOCIALHISTORY_GEN_ALL_CORE
Smoker; >25 pack year history. Currently 0.25 packs a year  Social EtOH  Smoked marijuana a few months ago    Ambulates without assistive devices

## 2022-03-05 NOTE — H&P ADULT - ASSESSMENT
51 year old female with PMHx of hyperthyroidism, GERD, COPD not on home O2 who presented to the ED with shortness of breath.    #Shortness of breath  #H/o COPD  #Cough with clear sputum production  - Admit to medicine   - Continue on Symbicort in place of home Advair. Will convert to the 160mg dose  - Continue on Duonebs q4 ATC for now; will hold tiotropium given ipratropium in the duoneb  - Start Azithromycin 500mg daily for GOLD criteria  - Given Solumedrol 125 in the ED; will continue on 60mg IV q8  - Continue on Singulair    #Hyperthyroidism  - Continue on Methimazole 5mg PO daily    #GERD  - Continue on Protonix    Activity: As tolerated  Diet: Regular  DVT ppx: Lovenox  GI ppx: Protonix  Code Status: Full Code  DISPO: Acute     Med rec reviewed with the patient who is of sound mind.   Plan discussed with hospitalist [phone] 51 year old female with PMHx of hyperthyroidism, GERD, COPD not on home O2 who presented to the ED with shortness of breath.    #Shortness of breath  #H/o COPD  #Cough with clear sputum production  - Admit to medicine   - Continue on Symbicort in place of home Advair. Will convert to the 160mg dose  - Continue on Duonebs q4 ATC for now; will hold tiotropium given ipratropium in the duoneb  - Start Azithromycin 500mg daily for GOLD criteria  - Given Solumedrol 125 in the ED; will continue on 60mg IV q8  - Continue on Singulair    #Hyperthyroidism  - Continue on Methimazole 5mg PO daily    #GERD  - Continue on Protonix    #Tobacco Abuse  - Counseling and education provided     Activity: As tolerated  Diet: Regular  DVT ppx: Lovenox  GI ppx: Protonix  Code Status: Full Code  DISPO: Acute     Med rec reviewed with the patient who is of sound mind.   Plan discussed with hospitalist [phone]

## 2022-03-06 LAB
ANION GAP SERPL CALC-SCNC: 12 MMOL/L — SIGNIFICANT CHANGE UP (ref 7–14)
BASOPHILS # BLD AUTO: 0.01 K/UL — SIGNIFICANT CHANGE UP (ref 0–0.2)
BASOPHILS NFR BLD AUTO: 0.2 % — SIGNIFICANT CHANGE UP (ref 0–1)
BUN SERPL-MCNC: 10 MG/DL — SIGNIFICANT CHANGE UP (ref 10–20)
CALCIUM SERPL-MCNC: 10 MG/DL — SIGNIFICANT CHANGE UP (ref 8.5–10.1)
CHLORIDE SERPL-SCNC: 101 MMOL/L — SIGNIFICANT CHANGE UP (ref 98–110)
CO2 SERPL-SCNC: 24 MMOL/L — SIGNIFICANT CHANGE UP (ref 17–32)
CREAT SERPL-MCNC: 0.7 MG/DL — SIGNIFICANT CHANGE UP (ref 0.7–1.5)
EGFR: 103 ML/MIN/1.73M2 — SIGNIFICANT CHANGE UP
EOSINOPHIL # BLD AUTO: 0 K/UL — SIGNIFICANT CHANGE UP (ref 0–0.7)
EOSINOPHIL NFR BLD AUTO: 0 % — SIGNIFICANT CHANGE UP (ref 0–8)
GLUCOSE SERPL-MCNC: 148 MG/DL — HIGH (ref 70–99)
HCT VFR BLD CALC: 40.9 % — SIGNIFICANT CHANGE UP (ref 37–47)
HGB BLD-MCNC: 13.8 G/DL — SIGNIFICANT CHANGE UP (ref 12–16)
IMM GRANULOCYTES NFR BLD AUTO: 0.2 % — SIGNIFICANT CHANGE UP (ref 0.1–0.3)
LYMPHOCYTES # BLD AUTO: 0.65 K/UL — LOW (ref 1.2–3.4)
LYMPHOCYTES # BLD AUTO: 11.7 % — LOW (ref 20.5–51.1)
MAGNESIUM SERPL-MCNC: 1.9 MG/DL — SIGNIFICANT CHANGE UP (ref 1.8–2.4)
MCHC RBC-ENTMCNC: 32.9 PG — HIGH (ref 27–31)
MCHC RBC-ENTMCNC: 33.7 G/DL — SIGNIFICANT CHANGE UP (ref 32–37)
MCV RBC AUTO: 97.6 FL — SIGNIFICANT CHANGE UP (ref 81–99)
MONOCYTES # BLD AUTO: 0.14 K/UL — SIGNIFICANT CHANGE UP (ref 0.1–0.6)
MONOCYTES NFR BLD AUTO: 2.5 % — SIGNIFICANT CHANGE UP (ref 1.7–9.3)
NEUTROPHILS # BLD AUTO: 4.75 K/UL — SIGNIFICANT CHANGE UP (ref 1.4–6.5)
NEUTROPHILS NFR BLD AUTO: 85.4 % — HIGH (ref 42.2–75.2)
NRBC # BLD: 0 /100 WBCS — SIGNIFICANT CHANGE UP (ref 0–0)
PLATELET # BLD AUTO: 266 K/UL — SIGNIFICANT CHANGE UP (ref 130–400)
POTASSIUM SERPL-MCNC: 4.5 MMOL/L — SIGNIFICANT CHANGE UP (ref 3.5–5)
POTASSIUM SERPL-SCNC: 4.5 MMOL/L — SIGNIFICANT CHANGE UP (ref 3.5–5)
RBC # BLD: 4.19 M/UL — LOW (ref 4.2–5.4)
RBC # FLD: 12.4 % — SIGNIFICANT CHANGE UP (ref 11.5–14.5)
SODIUM SERPL-SCNC: 137 MMOL/L — SIGNIFICANT CHANGE UP (ref 135–146)
WBC # BLD: 5.56 K/UL — SIGNIFICANT CHANGE UP (ref 4.8–10.8)
WBC # FLD AUTO: 5.56 K/UL — SIGNIFICANT CHANGE UP (ref 4.8–10.8)

## 2022-03-06 PROCEDURE — 99232 SBSQ HOSP IP/OBS MODERATE 35: CPT

## 2022-03-06 RX ADMIN — Medication 40 MILLIGRAM(S): at 06:23

## 2022-03-06 RX ADMIN — Medication 40 MILLIGRAM(S): at 17:51

## 2022-03-06 RX ADMIN — AZITHROMYCIN 255 MILLIGRAM(S): 500 TABLET, FILM COATED ORAL at 17:51

## 2022-03-06 RX ADMIN — Medication 3 MILLILITER(S): at 16:36

## 2022-03-06 RX ADMIN — BUDESONIDE AND FORMOTEROL FUMARATE DIHYDRATE 2 PUFF(S): 160; 4.5 AEROSOL RESPIRATORY (INHALATION) at 08:17

## 2022-03-06 RX ADMIN — Medication 3 MILLILITER(S): at 20:47

## 2022-03-06 RX ADMIN — PANTOPRAZOLE SODIUM 40 MILLIGRAM(S): 20 TABLET, DELAYED RELEASE ORAL at 06:23

## 2022-03-06 RX ADMIN — Medication 3 MILLILITER(S): at 11:42

## 2022-03-06 RX ADMIN — Medication 1 SPRAY(S): at 06:24

## 2022-03-06 RX ADMIN — BUDESONIDE AND FORMOTEROL FUMARATE DIHYDRATE 2 PUFF(S): 160; 4.5 AEROSOL RESPIRATORY (INHALATION) at 21:49

## 2022-03-06 NOTE — PROGRESS NOTE ADULT - SUBJECTIVE AND OBJECTIVE BOX
Pt seen and examined at bedside. Feels better.     VITAL SIGNS (Last 24 hrs):  T(C): 36.9 (03-06-22 @ 14:15), Max: 37.1 (03-05-22 @ 21:22)  HR: 97 (03-06-22 @ 14:15) (78 - 100)  BP: 146/67 (03-06-22 @ 14:15) (146/67 - 165/91)  RR: 18 (03-06-22 @ 14:15) (18 - 19)  SpO2: 100% (03-05-22 @ 18:29) (100% - 100%)  Wt(kg): --  Daily     Daily     I&O's Summary      PHYSICAL EXAM:  GENERAL: NAD, well-developed  HEAD:  Atraumatic, Normocephalic  EYES: EOMI, PERRLA, conjunctiva and sclera clear  NECK: Supple, No JVD  CHEST/LUNG: Clear to auscultation bilaterally; No wheeze  HEART: Regular rate and rhythm; No murmurs, rubs, or gallops  ABDOMEN: Soft, Nontender, Nondistended; Bowel sounds present  EXTREMITIES:  2+ Peripheral Pulses, No clubbing, cyanosis, or edema  PSYCH: AAOx3  NEUROLOGY: non-focal  SKIN: No rashes or lesions    Labs Reviewed  Spoke to patient in regards to abnormal labs.    CBC Full  -  ( 06 Mar 2022 07:45 )  WBC Count : 5.56 K/uL  Hemoglobin : 13.8 g/dL  Hematocrit : 40.9 %  Platelet Count - Automated : 266 K/uL  Mean Cell Volume : 97.6 fL  Mean Cell Hemoglobin : 32.9 pg  Mean Cell Hemoglobin Concentration : 33.7 g/dL  Auto Neutrophil # : 4.75 K/uL  Auto Lymphocyte # : 0.65 K/uL  Auto Monocyte # : 0.14 K/uL  Auto Eosinophil # : 0.00 K/uL  Auto Basophil # : 0.01 K/uL  Auto Neutrophil % : 85.4 %  Auto Lymphocyte % : 11.7 %  Auto Monocyte % : 2.5 %  Auto Eosinophil % : 0.0 %  Auto Basophil % : 0.2 %    BMP:    03-06 @ 07:45    Blood Urea Nitrogen - 10  Calcium - 10.0  Carbond Dioxide - 24  Chloride - 101  Creatinine - 0.7  Glucose - 148  Potassium - 4.5  Sodium - 137           MEDICATIONS  (STANDING):  albuterol/ipratropium for Nebulization 3 milliLiter(s) Nebulizer every 4 hours  azithromycin  IVPB      azithromycin  IVPB 500 milliGRAM(s) IV Intermittent every 24 hours  budesonide 160 MICROgram(s)/formoterol 4.5 MICROgram(s) Inhaler 2 Puff(s) Inhalation two times a day  chlorhexidine 4% Liquid 1 Application(s) Topical <User Schedule>  enoxaparin Injectable 40 milliGRAM(s) SubCutaneous every 24 hours  fluticasone propionate 50 MICROgram(s)/spray Nasal Spray 1 Spray(s) Both Nostrils two times a day  influenza   Vaccine 0.5 milliLiter(s) IntraMuscular once  methimazole 5 milliGRAM(s) Oral daily  methylPREDNISolone sodium succinate Injectable 40 milliGRAM(s) IV Push every 12 hours  montelukast 10 milliGRAM(s) Oral daily  pantoprazole    Tablet 40 milliGRAM(s) Oral before breakfast    MEDICATIONS  (PRN):  ALBUTerol    90 MICROgram(s) HFA Inhaler 2 Puff(s) Inhalation every 6 hours PRN Shortness of Breath and/or Wheezing

## 2022-03-06 NOTE — PROGRESS NOTE ADULT - SUBJECTIVE AND OBJECTIVE BOX
CHIEF COMPLAINT:  Patient is a 51y old  Female who presents with a chief complaint of COPD Exacerbation (06 Mar 2022 14:26)      INTERVAL HISTORY/OVERNIGHT EVENTS:      ======================  MEDICATIONS:  albuterol/ipratropium for Nebulization 3 milliLiter(s) Nebulizer every 4 hours  azithromycin  IVPB      azithromycin  IVPB 500 milliGRAM(s) IV Intermittent every 24 hours  budesonide 160 MICROgram(s)/formoterol 4.5 MICROgram(s) Inhaler 2 Puff(s) Inhalation two times a day  chlorhexidine 4% Liquid 1 Application(s) Topical <User Schedule>  enoxaparin Injectable 40 milliGRAM(s) SubCutaneous every 24 hours  fluticasone propionate 50 MICROgram(s)/spray Nasal Spray 1 Spray(s) Both Nostrils two times a day  influenza   Vaccine 0.5 milliLiter(s) IntraMuscular once  methimazole 5 milliGRAM(s) Oral daily  methylPREDNISolone sodium succinate Injectable 40 milliGRAM(s) IV Push every 12 hours  montelukast 10 milliGRAM(s) Oral daily  pantoprazole    Tablet 40 milliGRAM(s) Oral before breakfast    DRIPS:    PRN:       ======================  PHYSICAL EXAMINATION:  GEN:  nad.   HEENT:  eomi. ncat  PULM:  b/l bs.  clear.  no wheezing. no crackles or rales.   CARD: regular. s1, s2.  no murmurs.   ABD: +bs. ntnd  EXT:  no new rashes.  no pitting edema b/l .   NEURO:  no new focal deficits.   ======================  OBJECTIVE:        VS:  T(F): 98 (03-06 @ 19:51), Max: 98.7 (03-05 @ 21:22)  HR: 88 (03-06 @ 19:51) (78 - 97)  BP: 126/58 (03-06 @ 19:51) (126/58 - 150/76)  RR: 18 (03-06 @ 19:51) (18 - 19)  SpO2: --  CVP(mm Hg): --  CO: --  CI: --  PA: --  PCWP: --    I/O:        Weight trend:  Weight (kg): 55.5 (03-05)    ======================    LABS:                          13.8   5.56  )-----------( 266      ( 06 Mar 2022 07:45 )             40.9     03-06    137  |  101  |  10  ----------------------------<  148<H>  4.5   |  24  |  0.7    Ca    10.0      06 Mar 2022 07:45  Mg     1.9     03-06    TPro  7.3  /  Alb  4.3  /  TBili  0.4  /  DBili  x   /  AST  21  /  ALT  14  /  AlkPhos  60  03-05    LIVER FUNCTIONS - ( 05 Mar 2022 08:58 )  Alb: 4.3 g/dL / Pro: 7.3 g/dL / ALK PHOS: 60 U/L / ALT: 14 U/L / AST: 21 U/L / GGT: x               CARDIAC MARKERS ( 05 Mar 2022 08:58 )  x     / <0.01 ng/mL / x     / x     / x          Serum Pro-Brain Natriuretic Peptide: 111 pg/mL (03-05)        Cultures:         CHIEF COMPLAINT:  Patient is a 51y old  Female who presents with a chief complaint of COPD Exacerbation (06 Mar 2022 14:26)      INTERVAL HISTORY/OVERNIGHT EVENTS:  no events    ======================  MEDICATIONS:  albuterol/ipratropium for Nebulization 3 milliLiter(s) Nebulizer every 4 hours  azithromycin  IVPB      azithromycin  IVPB 500 milliGRAM(s) IV Intermittent every 24 hours  budesonide 160 MICROgram(s)/formoterol 4.5 MICROgram(s) Inhaler 2 Puff(s) Inhalation two times a day  chlorhexidine 4% Liquid 1 Application(s) Topical <User Schedule>  enoxaparin Injectable 40 milliGRAM(s) SubCutaneous every 24 hours  fluticasone propionate 50 MICROgram(s)/spray Nasal Spray 1 Spray(s) Both Nostrils two times a day  influenza   Vaccine 0.5 milliLiter(s) IntraMuscular once  methimazole 5 milliGRAM(s) Oral daily  methylPREDNISolone sodium succinate Injectable 40 milliGRAM(s) IV Push every 12 hours  montelukast 10 milliGRAM(s) Oral daily  pantoprazole    Tablet 40 milliGRAM(s) Oral before breakfast    DRIPS:    PRN:       ======================  PHYSICAL EXAMINATION:  GEN:  nad.   HEENT:  eomi. ncat  PULM:  b/l bs.  clear.  no wheezing. no crackles or rales.   CARD: regular. s1, s2.  no murmurs.   ABD: +bs. ntnd  EXT:  no new rashes.  no pitting edema b/l .   NEURO:  no new focal deficits.   ======================  OBJECTIVE:        VS:  T(F): 98 (03-06 @ 19:51), Max: 98.7 (03-05 @ 21:22)  HR: 88 (03-06 @ 19:51) (78 - 97)  BP: 126/58 (03-06 @ 19:51) (126/58 - 150/76)  RR: 18 (03-06 @ 19:51) (18 - 19)  SpO2: --  CVP(mm Hg): --  CO: --  CI: --  PA: --  PCWP: --    I/O:        Weight trend:  Weight (kg): 55.5 (03-05)    ======================    LABS:                          13.8   5.56  )-----------( 266      ( 06 Mar 2022 07:45 )             40.9     03-06    137  |  101  |  10  ----------------------------<  148<H>  4.5   |  24  |  0.7    Ca    10.0      06 Mar 2022 07:45  Mg     1.9     03-06    TPro  7.3  /  Alb  4.3  /  TBili  0.4  /  DBili  x   /  AST  21  /  ALT  14  /  AlkPhos  60  03-05    LIVER FUNCTIONS - ( 05 Mar 2022 08:58 )  Alb: 4.3 g/dL / Pro: 7.3 g/dL / ALK PHOS: 60 U/L / ALT: 14 U/L / AST: 21 U/L / GGT: x               CARDIAC MARKERS ( 05 Mar 2022 08:58 )  x     / <0.01 ng/mL / x     / x     / x          Serum Pro-Brain Natriuretic Peptide: 111 pg/mL (03-05)        Cultures:

## 2022-03-07 ENCOUNTER — TRANSCRIPTION ENCOUNTER (OUTPATIENT)
Age: 52
End: 2022-03-07

## 2022-03-07 VITALS
HEART RATE: 65 BPM | DIASTOLIC BLOOD PRESSURE: 66 MMHG | RESPIRATION RATE: 18 BRPM | SYSTOLIC BLOOD PRESSURE: 121 MMHG | TEMPERATURE: 98 F

## 2022-03-07 LAB
ALBUMIN SERPL ELPH-MCNC: 4.4 G/DL — SIGNIFICANT CHANGE UP (ref 3.5–5.2)
ALP SERPL-CCNC: 55 U/L — SIGNIFICANT CHANGE UP (ref 30–115)
ALT FLD-CCNC: 12 U/L — SIGNIFICANT CHANGE UP (ref 0–41)
ANION GAP SERPL CALC-SCNC: 11 MMOL/L — SIGNIFICANT CHANGE UP (ref 7–14)
AST SERPL-CCNC: 11 U/L — SIGNIFICANT CHANGE UP (ref 0–41)
BASOPHILS # BLD AUTO: 0.01 K/UL — SIGNIFICANT CHANGE UP (ref 0–0.2)
BASOPHILS NFR BLD AUTO: 0.1 % — SIGNIFICANT CHANGE UP (ref 0–1)
BILIRUB SERPL-MCNC: 0.3 MG/DL — SIGNIFICANT CHANGE UP (ref 0.2–1.2)
BUN SERPL-MCNC: 13 MG/DL — SIGNIFICANT CHANGE UP (ref 10–20)
CALCIUM SERPL-MCNC: 9.6 MG/DL — SIGNIFICANT CHANGE UP (ref 8.5–10.1)
CHLORIDE SERPL-SCNC: 103 MMOL/L — SIGNIFICANT CHANGE UP (ref 98–110)
CO2 SERPL-SCNC: 25 MMOL/L — SIGNIFICANT CHANGE UP (ref 17–32)
CREAT SERPL-MCNC: 0.7 MG/DL — SIGNIFICANT CHANGE UP (ref 0.7–1.5)
EGFR: 103 ML/MIN/1.73M2 — SIGNIFICANT CHANGE UP
EOSINOPHIL # BLD AUTO: 0 K/UL — SIGNIFICANT CHANGE UP (ref 0–0.7)
EOSINOPHIL NFR BLD AUTO: 0 % — SIGNIFICANT CHANGE UP (ref 0–8)
GLUCOSE SERPL-MCNC: 98 MG/DL — SIGNIFICANT CHANGE UP (ref 70–99)
HCT VFR BLD CALC: 37.9 % — SIGNIFICANT CHANGE UP (ref 37–47)
HGB BLD-MCNC: 12.7 G/DL — SIGNIFICANT CHANGE UP (ref 12–16)
IMM GRANULOCYTES NFR BLD AUTO: 0.2 % — SIGNIFICANT CHANGE UP (ref 0.1–0.3)
LYMPHOCYTES # BLD AUTO: 1.59 K/UL — SIGNIFICANT CHANGE UP (ref 1.2–3.4)
LYMPHOCYTES # BLD AUTO: 18.4 % — LOW (ref 20.5–51.1)
MAGNESIUM SERPL-MCNC: 1.9 MG/DL — SIGNIFICANT CHANGE UP (ref 1.8–2.4)
MCHC RBC-ENTMCNC: 32.6 PG — HIGH (ref 27–31)
MCHC RBC-ENTMCNC: 33.5 G/DL — SIGNIFICANT CHANGE UP (ref 32–37)
MCV RBC AUTO: 97.4 FL — SIGNIFICANT CHANGE UP (ref 81–99)
MONOCYTES # BLD AUTO: 0.54 K/UL — SIGNIFICANT CHANGE UP (ref 0.1–0.6)
MONOCYTES NFR BLD AUTO: 6.3 % — SIGNIFICANT CHANGE UP (ref 1.7–9.3)
NEUTROPHILS # BLD AUTO: 6.48 K/UL — SIGNIFICANT CHANGE UP (ref 1.4–6.5)
NEUTROPHILS NFR BLD AUTO: 75 % — SIGNIFICANT CHANGE UP (ref 42.2–75.2)
NRBC # BLD: 0 /100 WBCS — SIGNIFICANT CHANGE UP (ref 0–0)
PLATELET # BLD AUTO: 277 K/UL — SIGNIFICANT CHANGE UP (ref 130–400)
POTASSIUM SERPL-MCNC: 4.4 MMOL/L — SIGNIFICANT CHANGE UP (ref 3.5–5)
POTASSIUM SERPL-SCNC: 4.4 MMOL/L — SIGNIFICANT CHANGE UP (ref 3.5–5)
PROT SERPL-MCNC: 7.4 G/DL — SIGNIFICANT CHANGE UP (ref 6–8)
RBC # BLD: 3.89 M/UL — LOW (ref 4.2–5.4)
RBC # FLD: 12.7 % — SIGNIFICANT CHANGE UP (ref 11.5–14.5)
SODIUM SERPL-SCNC: 139 MMOL/L — SIGNIFICANT CHANGE UP (ref 135–146)
WBC # BLD: 8.64 K/UL — SIGNIFICANT CHANGE UP (ref 4.8–10.8)
WBC # FLD AUTO: 8.64 K/UL — SIGNIFICANT CHANGE UP (ref 4.8–10.8)

## 2022-03-07 PROCEDURE — 99233 SBSQ HOSP IP/OBS HIGH 50: CPT

## 2022-03-07 RX ORDER — METHIMAZOLE 10 MG/1
1 TABLET ORAL
Qty: 14 | Refills: 0
Start: 2022-03-07 | End: 2022-03-20

## 2022-03-07 RX ORDER — ALBUTEROL 90 UG/1
1 AEROSOL, METERED ORAL
Qty: 5 | Refills: 0
Start: 2022-03-07

## 2022-03-07 RX ORDER — FLUTICASONE PROPIONATE 50 MCG
1 SPRAY, SUSPENSION NASAL
Qty: 1 | Refills: 0
Start: 2022-03-07 | End: 2022-03-13

## 2022-03-07 RX ORDER — TIOTROPIUM BROMIDE 18 UG/1
0 CAPSULE ORAL; RESPIRATORY (INHALATION)
Qty: 0 | Refills: 0 | DISCHARGE

## 2022-03-07 RX ORDER — IPRATROPIUM/ALBUTEROL SULFATE 18-103MCG
3 AEROSOL WITH ADAPTER (GRAM) INHALATION
Qty: 360 | Refills: 0
Start: 2022-03-07 | End: 2022-04-05

## 2022-03-07 RX ORDER — FLUTICASONE PROPIONATE AND SALMETEROL 50; 250 UG/1; UG/1
1 POWDER ORAL; RESPIRATORY (INHALATION)
Qty: 30 | Refills: 0
Start: 2022-03-07 | End: 2022-04-05

## 2022-03-07 RX ORDER — MONTELUKAST 4 MG/1
1 TABLET, CHEWABLE ORAL
Qty: 14 | Refills: 0
Start: 2022-03-07 | End: 2022-03-20

## 2022-03-07 RX ORDER — AZITHROMYCIN 500 MG/1
1 TABLET, FILM COATED ORAL
Qty: 2 | Refills: 0
Start: 2022-03-07 | End: 2022-03-08

## 2022-03-07 RX ORDER — PANTOPRAZOLE SODIUM 20 MG/1
1 TABLET, DELAYED RELEASE ORAL
Qty: 14 | Refills: 0
Start: 2022-03-07 | End: 2022-03-20

## 2022-03-07 RX ORDER — IPRATROPIUM/ALBUTEROL SULFATE 18-103MCG
3 AEROSOL WITH ADAPTER (GRAM) INHALATION
Qty: 360 | Refills: 0
Start: 2022-03-07 | End: 2022-07-02

## 2022-03-07 RX ADMIN — PANTOPRAZOLE SODIUM 40 MILLIGRAM(S): 20 TABLET, DELAYED RELEASE ORAL at 05:21

## 2022-03-07 RX ADMIN — Medication 3 MILLILITER(S): at 13:17

## 2022-03-07 RX ADMIN — Medication 3 MILLILITER(S): at 00:25

## 2022-03-07 RX ADMIN — MONTELUKAST 10 MILLIGRAM(S): 4 TABLET, CHEWABLE ORAL at 14:24

## 2022-03-07 RX ADMIN — Medication 40 MILLIGRAM(S): at 05:22

## 2022-03-07 RX ADMIN — BUDESONIDE AND FORMOTEROL FUMARATE DIHYDRATE 2 PUFF(S): 160; 4.5 AEROSOL RESPIRATORY (INHALATION) at 13:15

## 2022-03-07 RX ADMIN — Medication 1 SPRAY(S): at 05:21

## 2022-03-07 NOTE — DISCHARGE NOTE PROVIDER - CARE PROVIDER_API CALL
Louis Dinh)  Internal Medicine  [address] [place] [address], [address] [place] [address]  Phone: [phone]  Fax: [phone]  Follow Up Time: 1 month   Louis Dinh)  Internal Medicine  [address] [facility], [address]st [place] [address]  Phone: [phone]  Fax: [phone]  Follow Up Time: 1 month    Virgilio Alexander)  Critical Care Medicine; Internal Medicine; Pulmonary Disease  [address] [facility], [address]  [place] [address]  Phone: [phone]  Fax: [phone]  Follow Up Time: 2 weeks

## 2022-03-07 NOTE — DISCHARGE NOTE PROVIDER - HOSPITAL COURSE
51 year old female with PMHx of hyperthyroidism, GERD, COPD not on home O2 who presented to the ED with shortness of breath.    Was treated for COPD exacerbation with albuterol, duonebs, Solumedrol --> prednisone, and azithromycin.     Never required O2 on this visit.     Medically cleared and stable for discharge on 3/7.

## 2022-03-07 NOTE — DISCHARGE NOTE NURSING/CASE MANAGEMENT/SOCIAL WORK - PATIENT PORTAL LINK FT
You can access the FollowMyHealth Patient Portal offered by [facility] by registering at the following website: [email or web address]. By joining [employer]’s FollowMyHealth portal, you will also be able to view your health information using other applications (apps) compatible with our system.

## 2022-03-07 NOTE — DISCHARGE NOTE NURSING/CASE MANAGEMENT/SOCIAL WORK - NSDCPEFALRISK_GEN_ALL_CORE
For information on Fall & Injury Prevention, visit: [email or web address] OR  [email or web address] OR  https://www.cdc.gov/steadi/patient.html

## 2022-03-07 NOTE — DISCHARGE NOTE PROVIDER - NSDCMRMEDTOKEN_GEN_ALL_CORE_FT
Advair Diskus 250 mcg-50 mcg inhalation powder: 1 application inhaled once a day   albuterol 90 mcg/inh inhalation aerosol: 1 puff(s) inhaled every 4 hours, As needed, Bronchospasm  fluticasone 50 mcg/inh nasal spray: 1 spray(s) intranasally once a day   ipratropium-albuterol 0.5 mg-2.5 mg/3 mL inhalation solution: 3 milliliter(s) by nebulizer 4 times a day   methIMAzole 5 mg oral tablet: 1 tab(s) orally once a day   montelukast 10 mg oral tablet: 1 tab(s) orally once a day   pantoprazole 40 mg oral delayed release tablet: 1 tab(s) orally once a day   Spiriva 18 mcg inhalation capsule:    Advair Diskus 250 mcg-50 mcg inhalation powder: 1 application inhaled once a day   albuterol 90 mcg/inh inhalation aerosol: 1 puff(s) inhaled every 4 hours, As needed, Bronchospasm  azithromycin 250 mg oral tablet: 1 tab(s) orally once a day, on 3/8 and 3/9.   fluticasone 50 mcg/inh nasal spray: 1 spray(s) intranasally once a day   ipratropium-albuterol 0.5 mg-2.5 mg/3 mL inhalation solution: 3 milliliter(s) by nebulizer 4 times a day   methIMAzole 5 mg oral tablet: 1 tab(s) orally once a day   montelukast 10 mg oral tablet: 1 tab(s) orally once a day   pantoprazole 40 mg oral delayed release tablet: 1 tab(s) orally once a day   predniSONE 20 mg oral tablet: 2 tab(s) orally once a day, from 3/8-3/11.

## 2022-03-07 NOTE — DISCHARGE NOTE PROVIDER - CARE PROVIDERS DIRECT ADDRESSES
,[email or web address]

## 2022-03-07 NOTE — DISCHARGE NOTE PROVIDER - PROVIDER TOKENS
PROVIDER:[TOKEN:[[id number]],FOLLOWUP:[1 month]] PROVIDER:[TOKEN:[[id number]],FOLLOWUP:[1 month]],PROVIDER:[TOKEN:[[id number]],FOLLOWUP:[2 weeks]]

## 2022-03-07 NOTE — DISCHARGE NOTE PROVIDER - NSDCFUADDINST_GEN_ALL_CORE_FT
Please reach out to your primary care physician if you are interested in quitting smoking. This can really help your COPD. Nicotine replacements and other medications are available to help you quit.

## 2022-03-07 NOTE — PROGRESS NOTE ADULT - SUBJECTIVE AND OBJECTIVE BOX
51 year old female with PMHx of hyperthyroidism, GERD, COPD not on home O2 who presented to the ED with shortness of breath.  Pt was admitted for COPD exacerbation, treated with IV Steroids, clinically improved.  Today pt feels better, denies any complaints, asking about discharge.     Vital Signs Last 24 Hrs  T(C): 36.6 (07 Mar 2022 13:22), Max: 36.9 (06 Mar 2022 14:15)  T(F): 97.9 (07 Mar 2022 13:22), Max: 98.5 (06 Mar 2022 14:15)  HR: 65 (07 Mar 2022 13:22) (65 - 97)  BP: 121/66 (07 Mar 2022 13:22) (121/66 - 146/67)  BP(mean): --  RR: 18 (07 Mar 2022 13:22) (18 - 18)  SpO2: 98% (07 Mar 2022 11:58) (98% - 98%)    PHYSICAL EXAM:  GENERAL: NAD, well-developed  HEAD:  Atraumatic, Normocephalic  EYES: EOMI, PERRLA, conjunctiva and sclera clear  NECK: Supple, No JVD  CHEST/LUNG: decreased BS at bases   HEART: Regular rate and rhythm; No murmurs, rubs, or gallops  ABDOMEN: Soft, Nontender, Nondistended; Bowel sounds present  EXTREMITIES:  2+ Peripheral Pulses, No clubbing, cyanosis, or edema  PSYCH: AAOx3  NEUROLOGY: non-focal  SKIN: No rashes or lesions    LABS:                        12.7   8.64  )-----------( 277      ( 07 Mar 2022 04:30 )             37.9   03-07    139  |  103  |  13  ----------------------------<  98  4.4   |  25  |  0.7    Ca    9.6      07 Mar 2022 04:30  Mg     1.9     03-07    TPro  7.4  /  Alb  4.4  /  TBili  0.3  /  DBili  x   /  AST  11  /  ALT  12  /  AlkPhos  55  03-07    RADIOLOGY:    < from: Xray Chest 1 View AP/PA (03.05.22 @ 09:16) >    Impression:    No focal pulmonary consolidation    < end of copied text >    MEDICATIONS  (STANDING):  albuterol/ipratropium for Nebulization 3 milliLiter(s) Nebulizer every 4 hours  azithromycin  IVPB      azithromycin  IVPB 500 milliGRAM(s) IV Intermittent every 24 hours  budesonide 160 MICROgram(s)/formoterol 4.5 MICROgram(s) Inhaler 2 Puff(s) Inhalation two times a day  chlorhexidine 4% Liquid 1 Application(s) Topical <User Schedule>  enoxaparin Injectable 40 milliGRAM(s) SubCutaneous every 24 hours  fluticasone propionate 50 MICROgram(s)/spray Nasal Spray 1 Spray(s) Both Nostrils two times a day  influenza   Vaccine 0.5 milliLiter(s) IntraMuscular once  methimazole 5 milliGRAM(s) Oral daily  montelukast 10 milliGRAM(s) Oral daily  pantoprazole    Tablet 40 milliGRAM(s) Oral before breakfast  predniSONE   Tablet 40 milliGRAM(s) Oral daily    MEDICATIONS  (PRN):  ALBUTerol    90 MICROgram(s) HFA Inhaler 2 Puff(s) Inhalation every 6 hours PRN Shortness of Breath and/or Wheezing

## 2022-03-07 NOTE — PROGRESS NOTE ADULT - ASSESSMENT
51 year old female with PMHx of hyperthyroidism, GERD, COPD not on home O2 who presented to the ED with shortness of breath.      A/P   #COPD Exacerbation   - c/w Duonebs q4 ATC for now; will hold tiotropium   -c/w Azithromycin 500mg daily   - change IV steroids to po Prednisone today for 5 days total   - Continue on Singulair and LABA/ICS   - pt was consulted regarding smoking cessation     # Hyperthyroidism  c/w Methimazole 5mg PO daily    #GERD  c/w Protonix    dvt ppx     # Dispo: pt is stable for discharge home today, she was instructed to follow up with pulmonary after discharge 
51 year old female with PMHx of hyperthyroidism, GERD, COPD not on home O2 who presented to the ED with shortness of breath.    #COPD Exacerbation   c/w Duonebs q4 ATC for now; will hold tiotropium   c/w Azithromycin 500mg daily   c/w Solumedrol, taper to BID   Continue on Singulair and LABA/ICS     #Hyperthyroidism  c/w Methimazole 5mg PO daily    #GERD  c/w Protonix    dvt ppx       Pending: taper steroids, anticipate dc in 24 hours
51 year old female with PMHx of hyperthyroidism, GERD, COPD not on home O2 who presented to the ED with shortness of breath.    #SOB  #ho COPD  #Cough with clear sputum production  -c/ Duonebs q4 standing; hold tiotropium  -c/ Azithromycin 500mg daily   -c/ Solumedrol, taper to BID   -c/ Singulair and LABA/ICS     #Hyperthyroidism  -c/ Methimazole 5mg PO qD    #GERD  -c/ Protonix 40 qD    #Tobacco use  -cessation counseling     Activity: As tolerated  Diet: Regular  DVT ppx: Lovenox  GI ppx: Protonix  Code Status: Full Code  DISPO: steroid taper

## 2022-03-07 NOTE — CHART NOTE - NSCHARTNOTEFT_GEN_A_CORE
<<<RESIDENT DISCHARGE NOTE>>>     ANDREW FISCHER  MRN-[id number]    VITAL SIGNS:  T(F): 97.6 (03-07-22 @ 05:24), Max: 98.5 (03-06-22 @ 14:15)  HR: 87 (03-07-22 @ 05:24)  BP: 131/88 (03-07-22 @ 05:24)  SpO2: --      PHYSICAL EXAMINATION:  General: no acute distress  Head & Neck: atraumatic, normocephalic   Pulmonary: Clear breath sounds in all fields; NO wheezing; no crackles   Cardiovascular: S1 S2 regular rate and rhythm   Gastrointestinal/Abdomen & Pelvis: Non-tender to palpation; non-distended   Neurologic/Motor: AxO x3 and safely ambulates on own     TEST RESULTS:                        12.7   8.64  )-----------( 277      ( 07 Mar 2022 04:30 )             37.9       03-07    139  |  103  |  13  ----------------------------<  98  4.4   |  25  |  0.7    Ca    9.6      07 Mar 2022 04:30  Mg     1.9     03-07    TPro  7.4  /  Alb  4.4  /  TBili  0.3  /  DBili  x   /  AST  11  /  ALT  12  /  AlkPhos  55  03-07      FINAL DISCHARGE INTERVIEW:  Resident(s) Present: Latesha Mccarthy DO     DISCHARGE MEDICATION RECONCILIATION  reviewed with Attending Dr. Ann Horvath MD     DISPOSITION:   [x  ] Home,

## 2022-03-07 NOTE — DISCHARGE NOTE PROVIDER - NSDCCPCAREPLAN_GEN_ALL_CORE_FT
PRINCIPAL DISCHARGE DIAGNOSIS  Diagnosis: COPD exacerbation  Assessment and Plan of Treatment: Chronic obstructive pulmonary disease (COPD) is a lung condition in which airflow from the lungs is limited. Causes include smoking, secondhand smoke exposure, genetics, or recurrent infections. Take all medicines (inhaled or pills) as directed by your health care provider. Avoid exposure to irritants such as smoke, chemicals, and fumes that aggravate your breathing.  If you are a smoker, the most important thing that you can do is stop smoking. Continuing to smoke will cause further lung damage and breathing trouble. Ask your health care provider for help with quitting smoking.  SEEK IMMEDIATE MEDICAL CARE IF YOU HAVE ANY OF THE FOLLOWING SYMPTOMS: shortness of breath at rest or when talking, bluish discoloration of lips, skin, fever, worsening cough, unexplained chest pain, or lightheadedness/dizziness.   Take Azithromycin for 2 days. This antibiotic has antinflammatory properties that will help with your COPD.   Take Prednisone for 3 more days.

## 2022-03-17 DIAGNOSIS — R00.0 TACHYCARDIA, UNSPECIFIED: ICD-10-CM

## 2022-03-17 DIAGNOSIS — F17.200 NICOTINE DEPENDENCE, UNSPECIFIED, UNCOMPLICATED: ICD-10-CM

## 2022-03-17 DIAGNOSIS — Z92.22 PERSONAL HISTORY OF MONOCLONAL DRUG THERAPY: ICD-10-CM

## 2022-03-17 DIAGNOSIS — R06.02 SHORTNESS OF BREATH: ICD-10-CM

## 2022-03-17 DIAGNOSIS — K21.9 GASTRO-ESOPHAGEAL REFLUX DISEASE WITHOUT ESOPHAGITIS: ICD-10-CM

## 2022-03-17 DIAGNOSIS — Z87.898 PERSONAL HISTORY OF OTHER SPECIFIED CONDITIONS: ICD-10-CM

## 2022-03-17 DIAGNOSIS — E05.90 THYROTOXICOSIS, UNSPECIFIED WITHOUT THYROTOXIC CRISIS OR STORM: ICD-10-CM

## 2022-03-17 DIAGNOSIS — J44.1 CHRONIC OBSTRUCTIVE PULMONARY DISEASE WITH (ACUTE) EXACERBATION: ICD-10-CM

## 2022-04-13 ENCOUNTER — EMERGENCY (EMERGENCY)
Facility: HOSPITAL | Age: 52
LOS: 0 days | Discharge: HOME | End: 2022-04-13
Attending: EMERGENCY MEDICINE | Admitting: EMERGENCY MEDICINE
Payer: MEDICAID

## 2022-04-13 VITALS
TEMPERATURE: 97 F | SYSTOLIC BLOOD PRESSURE: 144 MMHG | HEART RATE: 64 BPM | DIASTOLIC BLOOD PRESSURE: 72 MMHG | RESPIRATION RATE: 16 BRPM | OXYGEN SATURATION: 99 %

## 2022-04-13 VITALS
OXYGEN SATURATION: 97 % | DIASTOLIC BLOOD PRESSURE: 72 MMHG | HEIGHT: 65 IN | RESPIRATION RATE: 20 BRPM | HEART RATE: 60 BPM | WEIGHT: 123.02 LBS | SYSTOLIC BLOOD PRESSURE: 154 MMHG | TEMPERATURE: 96 F

## 2022-04-13 DIAGNOSIS — R07.89 OTHER CHEST PAIN: ICD-10-CM

## 2022-04-13 DIAGNOSIS — E05.90 THYROTOXICOSIS, UNSPECIFIED WITHOUT THYROTOXIC CRISIS OR STORM: ICD-10-CM

## 2022-04-13 DIAGNOSIS — J44.1 CHRONIC OBSTRUCTIVE PULMONARY DISEASE WITH (ACUTE) EXACERBATION: ICD-10-CM

## 2022-04-13 DIAGNOSIS — K21.9 GASTRO-ESOPHAGEAL REFLUX DISEASE WITHOUT ESOPHAGITIS: ICD-10-CM

## 2022-04-13 DIAGNOSIS — K46.9 UNSPECIFIED ABDOMINAL HERNIA WITHOUT OBSTRUCTION OR GANGRENE: Chronic | ICD-10-CM

## 2022-04-13 DIAGNOSIS — Z87.19 PERSONAL HISTORY OF OTHER DISEASES OF THE DIGESTIVE SYSTEM: ICD-10-CM

## 2022-04-13 DIAGNOSIS — J45.901 UNSPECIFIED ASTHMA WITH (ACUTE) EXACERBATION: ICD-10-CM

## 2022-04-13 DIAGNOSIS — Z86.2 PERSONAL HISTORY OF DISEASES OF THE BLOOD AND BLOOD-FORMING ORGANS AND CERTAIN DISORDERS INVOLVING THE IMMUNE MECHANISM: ICD-10-CM

## 2022-04-13 DIAGNOSIS — F17.200 NICOTINE DEPENDENCE, UNSPECIFIED, UNCOMPLICATED: ICD-10-CM

## 2022-04-13 PROCEDURE — 71046 X-RAY EXAM CHEST 2 VIEWS: CPT | Mod: 26

## 2022-04-13 PROCEDURE — 93010 ELECTROCARDIOGRAM REPORT: CPT

## 2022-04-13 PROCEDURE — 99285 EMERGENCY DEPT VISIT HI MDM: CPT

## 2022-04-13 RX ORDER — FLUTICASONE PROPIONATE AND SALMETEROL 50; 250 UG/1; UG/1
1 POWDER ORAL; RESPIRATORY (INHALATION)
Qty: 1 | Refills: 0
Start: 2022-04-13 | End: 2022-04-26

## 2022-04-13 RX ORDER — FLUTICASONE PROPIONATE AND SALMETEROL 50; 250 UG/1; UG/1
1 POWDER ORAL; RESPIRATORY (INHALATION)
Qty: 1 | Refills: 0
Start: 2022-04-13 | End: 2022-06-16

## 2022-04-13 RX ORDER — FLUTICASONE PROPIONATE AND SALMETEROL 50; 250 UG/1; UG/1
1 POWDER ORAL; RESPIRATORY (INHALATION)
Qty: 1 | Refills: 0
Start: 2022-04-13 | End: 2023-02-09

## 2022-04-13 RX ORDER — IPRATROPIUM/ALBUTEROL SULFATE 18-103MCG
3 AEROSOL WITH ADAPTER (GRAM) INHALATION
Refills: 0 | Status: COMPLETED | OUTPATIENT
Start: 2022-04-13 | End: 2022-04-13

## 2022-04-13 RX ORDER — ALBUTEROL 90 UG/1
2 AEROSOL, METERED ORAL
Qty: 1 | Refills: 0
Start: 2022-04-13 | End: 2022-04-26

## 2022-04-13 RX ORDER — ALBUTEROL 90 UG/1
3 AEROSOL, METERED ORAL
Qty: 126 | Refills: 0
Start: 2022-04-13 | End: 2022-04-26

## 2022-04-13 RX ADMIN — Medication 3 MILLILITER(S): at 10:34

## 2022-04-13 RX ADMIN — Medication 3 MILLILITER(S): at 10:23

## 2022-04-13 RX ADMIN — Medication 60 MILLIGRAM(S): at 10:32

## 2022-04-13 RX ADMIN — Medication 3 MILLILITER(S): at 10:30

## 2022-04-13 NOTE — ED PROVIDER NOTE - OBJECTIVE STATEMENT
51 y.o. female with a PMH of COPD, Fe deficiency anemia, and hyperthyroidism presented to the ER c/o worsening chest tightness over the past 2 days.  Pt reports her allergies triggers her COPD.  (+) mild relief with nebs.  Pt could not get an appointment with her pulmonologist.  Denies fever, chills, dizziness, nausea vomiting, diaphoresis.  No other complaints.

## 2022-04-13 NOTE — ED PROVIDER NOTE - NSFOLLOWUPINSTRUCTIONS_ED_ALL_ED_FT
Chronic Obstructive Pulmonary Disease       Chronic obstructive pulmonary disease (COPD) is a long-term (chronic) condition that affects the lungs. COPD is a general term that can be used to describe many different lung problems that cause lung inflammation and limit airflow, including chronic bronchitis and emphysema.    If you have COPD, your lung function will probably never return to normal. In most cases, it gets worse over time. However, there are steps you can take to slow the progression of the disease and improve your quality of life.      What are the causes?    This condition may be caused by:  •Smoking. This is the most common cause.      •Certain genes passed down through families.        What increases the risk?    The following factors may make you more likely to develop this condition:  •Being exposed to secondhand smoke from cigarettes, pipes, or cigars.      •Being exposed to chemicals and other irritants, such as fumes and dust in the work environment.      •Having chronic lung conditions or infections.        What are the signs or symptoms?    Symptoms of this condition include:  •Shortness of breath, especially during physical activity.      •Chronic cough with a large amount of thick mucus. Sometimes, the cough may not have any mucus (dry cough).      •Wheezing and rapid breathing.      •Gray or bluish discoloration (cyanosis) of the skin, especially in the fingers, toes, or lips.      •Feeling tired (fatigue).      •Weight loss.      •Chest tightness.      •Frequent infections.      •Episodes when breathing symptoms become much worse (exacerbations).      At the later stages of this disease, you may have swelling in the ankles, feet, or legs.      How is this diagnosed?    This condition is diagnosed based on:  •Your medical history.      •A physical exam.      You may also have tests, including:  •Lung (pulmonary) function tests. This may include a spirometry test, which measures your ability to exhale properly.      •Chest X-ray.      •CT scan.      •Blood tests.        How is this treated?    This condition may be treated with:•Medicines. These may include inhaled rescue medicines to treat acute exacerbations as well as medicines that you take long-term (maintenance medicines) to prevent flare-ups of COPD.  •Bronchodilators help treat COPD by dilating the airways to allow increased airflow and make your breathing more comfortable.      •Steroids can reduce airway inflammation and help prevent exacerbations.        •Smoking cessation. If you smoke, your health care provider may ask you to quit, and may also recommend therapy or replacement products to help you quit.      •Pulmonary rehabilitation. This may involve working with a team of health care providers and specialists, such as respiratory, occupational, and physical therapists.      •Exercise and physical activity. These are beneficial for nearly all people with COPD.      •Nutrition therapy to gain weight, if you are underweight.      •Oxygen. Supplemental oxygen therapy is only helpful if you have a low oxygen level in your blood (hypoxemia).      •Lung surgery or transplant.      •Palliative care. This is to help people with COPD feel comfortable when treatment is no longer working.        Follow these instructions at home:    Medicines     •Take over-the-counter and prescription medicines only as told by your health care provider. This includes inhaled medicines and pills.      •Talk to your health care provider before taking any cough or allergy medicines. You may need to avoid certain medicines that dry out your airways.      Lifestyle     •If you smoke, the most important thing that you can do is to stop smoking. Continuing to smoke will cause the disease to progress faster.      • Do not use any products that contain nicotine or tobacco. These products include cigarettes, chewing tobacco, and vaping devices, such as e-cigarettes. If you need help quitting, ask your health care provider.      •Avoid exposure to things that irritate your lungs, such as smoke, chemicals, and fumes.      •Stay active, but balance activity with periods of rest. Exercise and physical activity will help you maintain your ability to do things you want to do.      •Learn and use relaxation techniques to manage stress and to control your breathing.      •Get the right amount of sleep and get quality sleep. Most adults need 7 or more hours per night.      •Eat healthy foods. Eating smaller, more frequent meals and resting before meals may help you maintain your strength.      Controlled breathing       Learn and use controlled breathing techniques as directed by your health care provider. Controlled breathing techniques include:  •Pursed lip breathing. Start by breathing in (inhaling) through your nose for 1 second. Then, purse your lips as if you were going to whistle and breathe out (exhale) through the pursed lips for 2 seconds.      •Diaphragmatic breathing. Start by putting one hand on your abdomen just above your waist. Inhale slowly through your nose. The hand on your abdomen should move out. Then purse your lips and exhale slowly. You should be able to feel the hand on your abdomen moving in as you exhale.      Controlled coughing     Learn and use controlled coughing to clear mucus from your lungs. Controlled coughing is a series of short, progressive coughs. The steps of controlled coughing are:  1.Lean your head slightly forward.      2.Breathe in deeply using diaphragmatic breathing.      3.Try to hold your breath for 3 seconds.      4.Keep your mouth slightly open while coughing twice.      5.Spit any mucus out into a tissue.      6.Rest and repeat the steps once or twice as needed.      General instructions    •Make sure you receive all the vaccines that your health care provider recommends, especially the pneumococcal and influenza vaccines. Preventing infection and hospitalization is very important when you have COPD.      •Drink enough fluid to keep your urine pale yellow, unless you have a medical condition that requires fluid restriction.      •Use oxygen therapy and pulmonary rehabilitation if told by your health care provider. If you require home oxygen therapy, ask your health care provider whether you should purchase a pulse oximeter to measure your oxygen level at home.      •Work with your health care provider to develop a COPD action plan. This will help you know what steps to take if your condition gets worse.      •Keep other chronic health conditions under control as told by your health care provider.      •Avoid extreme temperature and humidity changes.      •Avoid contact with people who have an illness that spreads from person to person (is contagious), such as viral infections or pneumonia.      •Keep all follow-up visits. This is important.        Contact a health care provider if:    •You are coughing up more mucus than usual.      •There is a change in the color or thickness of your mucus.      •Your breathing is more labored than usual.      •Your breathing is faster than usual.      •You have difficulty sleeping.      •You need to use your rescue medicines or inhalers more often than expected.      •You have trouble doing routine activities such as getting dressed or walking around the house.        Get help right away if:    •You have shortness of breath while you are resting.    •You have shortness of breath that prevents you from:  •Being able to talk.      •Performing your usual physical activities.        •You have chest pain lasting longer than 5 minutes.      •Your skin color is more blue (cyanotic) than usual.      •You measure low oxygen saturations for longer than 5 minutes with a pulse oximeter.      •You have a fever.      •You feel too tired to breathe normally.      These symptoms may represent a serious problem that is an emergency. Do not wait to see if the symptoms will go away. Get medical help right away. Call your local emergency services (911 in the [place] Do not drive yourself to the hospital.       Summary    •Chronic obstructive pulmonary disease (COPD) is a long-term (chronic) condition that affects the lungs.      •Your lung function will probably never return to normal. In most cases, it gets worse over time. However, there are steps you can take to slow the progression of the disease and improve your quality of life.      •Treatment for COPD may include taking medicines, quitting smoking, pulmonary rehabilitation, and changes to diet and exercise. As the disease progresses, you may need oxygen therapy, a lung transplant, or palliative care.      •To help manage your condition, do not smoke, avoid exposure to things that irritate your lungs, stay up to date on all vaccines, and follow your health care provider's instructions for taking medicines.      This information is not intended to replace advice given to you by your health care provider. Make sure you discuss any questions you have with your health care provider.      Document Revised: 10/26/2021 Document Reviewed: 10/26/2021    Elsevier Patient Education © 2022 [employer]

## 2022-04-13 NOTE — ED PROVIDER NOTE - CLINICAL SUMMARY MEDICAL DECISION MAKING FREE TEXT BOX
51 y.o. female with a PMH of COPD, Fe deficiency anemia, and hyperthyroidism here with wheezing that pt states triggered by asthma. no f/c. mild chest tightness, nonexertional and pt states feels like tightness of her usual asthma. given nebs and completely relieved. on my reeval, on exam, nontoxic, vss, normal WOB, ctab, no w/r/r. heart reg no mrg. no peripheral edema. cxr clear. ekg nonischemic. In my opinion, based on current evaluation and results, an acute medical or surgical emergency does not appear to be occurring at this time and I feel that the pt is stable for further outpatient work up and/or treatment. Return precautions discussed.

## 2022-04-13 NOTE — ED ADULT NURSE NOTE - OBJECTIVE STATEMENT
Pt presents to ED c/o 2-3 hx of intermittent chest tightness. Pt denies fever, denies SOB. Pt presents to ED c/o 2-3 day hx of intermittent chest tightness associated with cough. Pt denies fever, denies SOB.

## 2022-04-13 NOTE — ED PROVIDER NOTE - PATIENT PORTAL LINK FT
You can access the FollowMyHealth Patient Portal offered by [facility] by registering at the following website: [email or web address]. By joining [facility]’s FollowMyHealth portal, you will also be able to view your health information using other applications (apps) compatible with our system.

## 2022-04-13 NOTE — ED ADULT NURSE NOTE - NSIMPLEMENTINTERV_GEN_ALL_ED
Implemented All Universal Safety Interventions:  [place] to call system. Call bell, personal items and telephone within reach. Instruct patient to call for assistance. Room bathroom lighting operational. Non-slip footwear when patient is off stretcher. Physically safe environment: no spills, clutter or unnecessary equipment. Stretcher in lowest position, wheels locked, appropriate side rails in place.

## 2022-04-13 NOTE — ED PROVIDER NOTE - TEMPLATE, MLM
Consult has been called to Dr. Sumeet Gao on 11/22/20 at [phone]. Allergic Rx Abdominal Pain, N/V/D

## 2022-04-13 NOTE — ED PROVIDER NOTE - PROGRESS NOTE DETAILS
[place]FK: No focal consolidation, signs of opacities or edema, pneumothorax, free air or evidence of trauma on my interpretation.

## 2022-04-13 NOTE — ED PROVIDER NOTE - NS ED ATTENDING STATEMENT MOD
This was a shared visit with the SANDEEP. I reviewed and verified the documentation and independently performed the documented:

## 2022-04-14 ENCOUNTER — NON-APPOINTMENT (OUTPATIENT)
Age: 52
End: 2022-04-14

## 2022-04-15 ENCOUNTER — APPOINTMENT (OUTPATIENT)
Dept: INTERNAL MEDICINE | Facility: CLINIC | Age: 52
End: 2022-04-15

## 2022-05-21 ENCOUNTER — NON-APPOINTMENT (OUTPATIENT)
Age: 52
End: 2022-05-21

## 2022-05-21 ENCOUNTER — OUTPATIENT (OUTPATIENT)
Dept: OUTPATIENT SERVICES | Facility: HOSPITAL | Age: 52
LOS: 1 days | Discharge: HOME | End: 2022-05-21

## 2022-05-21 ENCOUNTER — APPOINTMENT (OUTPATIENT)
Dept: INTERNAL MEDICINE | Facility: CLINIC | Age: 52
End: 2022-05-21

## 2022-05-21 VITALS
WEIGHT: 121.44 LBS | BODY MASS INDEX: 20.23 KG/M2 | OXYGEN SATURATION: 99 % | DIASTOLIC BLOOD PRESSURE: 76 MMHG | HEART RATE: 85 BPM | HEIGHT: 65 IN | TEMPERATURE: 97.5 F | SYSTOLIC BLOOD PRESSURE: 136 MMHG

## 2022-05-21 DIAGNOSIS — K46.9 UNSPECIFIED ABDOMINAL HERNIA WITHOUT OBSTRUCTION OR GANGRENE: Chronic | ICD-10-CM

## 2022-05-21 LAB
25(OH)D3 SERPL-MCNC: 24 NG/ML
ALBUMIN SERPL ELPH-MCNC: 4.8 G/DL
ALP BLD-CCNC: 70 U/L
ALT SERPL-CCNC: 11 U/L
AMPHET UR-MCNC: NEGATIVE
ANION GAP SERPL CALC-SCNC: 18 MMOL/L
AST SERPL-CCNC: 15 U/L
BARBITURATES UR-MCNC: NEGATIVE
BASOPHILS # BLD AUTO: 0.05 K/UL
BASOPHILS NFR BLD AUTO: 0.8 %
BENZODIAZ UR-MCNC: NEGATIVE
BILIRUB SERPL-MCNC: 0.3 MG/DL
BUN SERPL-MCNC: 8 MG/DL
CALCIUM SERPL-MCNC: 9.9 MG/DL
CHLORIDE SERPL-SCNC: 103 MMOL/L
CHOLEST SERPL-MCNC: 156 MG/DL
CO2 SERPL-SCNC: 22 MMOL/L
COCAINE METAB.OTHER UR-MCNC: NEGATIVE
CREAT SERPL-MCNC: 0.8 MG/DL
CREAT SPEC-SCNC: 13 MG/DL
CREATININE, URINE: 16.1 MG/DL
EGFR: 88 ML/MIN/1.73M2
EOSINOPHIL # BLD AUTO: 0.27 K/UL
EOSINOPHIL NFR BLD AUTO: 4.5 %
ESTIMATED AVERAGE GLUCOSE: 77 MG/DL
FERRITIN SERPL-MCNC: 66 NG/ML
GLUCOSE SERPL-MCNC: 94 MG/DL
HBA1C MFR BLD HPLC: 4.3 %
HBV CORE IGG+IGM SER QL: NONREACTIVE
HBV SURFACE AB SER QL: NONREACTIVE
HBV SURFACE AB SERPL IA-ACNC: <3 MIU/ML
HBV SURFACE AG SER QL: NONREACTIVE
HCT VFR BLD CALC: 36.6 %
HDLC SERPL-MCNC: 103 MG/DL
HGB BLD-MCNC: 11.7 G/DL
IMM GRANULOCYTES NFR BLD AUTO: 0.3 %
IRON SATN MFR SERPL: 13 %
IRON SERPL-MCNC: 56 UG/DL
LDLC SERPL CALC-MCNC: 36 MG/DL
LYMPHOCYTES # BLD AUTO: 1.48 K/UL
LYMPHOCYTES NFR BLD AUTO: 24.9 %
MAN DIFF?: NORMAL
MCHC RBC-ENTMCNC: 32 G/DL
MCHC RBC-ENTMCNC: 32 PG
MCV RBC AUTO: 100 FL
METHADONE UR-MCNC: NEGATIVE
METHAQUALONE UR-MCNC: NEGATIVE
MEV IGG FLD QL IA: >300 AU/ML
MEV IGG+IGM SER-IMP: POSITIVE
MICROALBUMIN 24H UR DL<=1MG/L-MCNC: <1.2 MG/DL
MICROALBUMIN/CREAT 24H UR-RTO: NORMAL MG/G
MONOCYTES # BLD AUTO: 0.52 K/UL
MONOCYTES NFR BLD AUTO: 8.8 %
MUV AB SER-ACNC: POSITIVE
MUV IGG SER QL IA: 239 AU/ML
NEUTROPHILS # BLD AUTO: 3.6 K/UL
NEUTROPHILS NFR BLD AUTO: 60.7 %
NONHDLC SERPL-MCNC: 53 MG/DL
OPIATES UR-MCNC: NEGATIVE
PCP UR-MCNC: NEGATIVE
PLATELET # BLD AUTO: 494 K/UL
POTASSIUM SERPL-SCNC: 4.9 MMOL/L
PROPOXYPH UR QL: NEGATIVE
PROT SERPL-MCNC: 7.8 G/DL
RBC # BLD: 3.66 M/UL
RBC # FLD: 14.6 %
RUBV IGG FLD-ACNC: 11.4 INDEX
RUBV IGG SER-IMP: POSITIVE
SODIUM SERPL-SCNC: 143 MMOL/L
THC UR QL: NEGATIVE
TIBC SERPL-MCNC: 418 UG/DL
TRANSFERRIN SERPL-MCNC: 339 MG/DL
TRIGL SERPL-MCNC: 102 MG/DL
TSH SERPL-ACNC: 0.45 UIU/ML
UIBC SERPL-MCNC: 362 UG/DL
VZV AB TITR SER: POSITIVE
VZV IGG SER IF-ACNC: 2018 INDEX
WBC # FLD AUTO: 5.94 K/UL

## 2022-05-21 PROCEDURE — 99214 OFFICE O/P EST MOD 30 MIN: CPT | Mod: GC

## 2022-05-21 NOTE — ASSESSMENT
[FreeTextEntry1] : 51 yr old female with\par 1.copd refill meds f/u with pulmonary \par 2.hyperthyroid on methimazole nltft\par gerd c/w ppi\par \par dental issues refer to dental\par rto in 6months and prn

## 2022-05-21 NOTE — HISTORY OF PRESENT ILLNESS
[FreeTextEntry1] : routine f/u [de-identified] : 51 yr old female with h/o1.copd,2.gerd,hyperthyroid here to che labs and refillmeds

## 2022-05-21 NOTE — PHYSICAL EXAM
[Normal Outer Ear/Nose] : the outer ears and nose were normal in appearance [No JVD] : no jugular venous distention [Clear to Auscultation] : lungs were clear to auscultation bilaterally [Normal] : no joint swelling and grossly normal strength and tone

## 2022-05-23 DIAGNOSIS — D50.9 IRON DEFICIENCY ANEMIA, UNSPECIFIED: ICD-10-CM

## 2022-05-23 DIAGNOSIS — J44.9 CHRONIC OBSTRUCTIVE PULMONARY DISEASE, UNSPECIFIED: ICD-10-CM

## 2022-05-23 DIAGNOSIS — E05.90 THYROTOXICOSIS, UNSPECIFIED WITHOUT THYROTOXIC CRISIS OR STORM: ICD-10-CM

## 2022-06-02 ENCOUNTER — NON-APPOINTMENT (OUTPATIENT)
Age: 52
End: 2022-06-02

## 2022-06-03 ENCOUNTER — EMERGENCY (EMERGENCY)
Facility: HOSPITAL | Age: 52
LOS: 0 days | Discharge: HOME | End: 2022-06-03
Attending: STUDENT IN AN ORGANIZED HEALTH CARE EDUCATION/TRAINING PROGRAM | Admitting: STUDENT IN AN ORGANIZED HEALTH CARE EDUCATION/TRAINING PROGRAM
Payer: MEDICAID

## 2022-06-03 VITALS
HEART RATE: 94 BPM | DIASTOLIC BLOOD PRESSURE: 88 MMHG | HEIGHT: 65 IN | RESPIRATION RATE: 20 BRPM | OXYGEN SATURATION: 99 % | TEMPERATURE: 97 F | WEIGHT: 119.93 LBS | SYSTOLIC BLOOD PRESSURE: 140 MMHG

## 2022-06-03 VITALS
OXYGEN SATURATION: 98 % | TEMPERATURE: 98 F | SYSTOLIC BLOOD PRESSURE: 123 MMHG | DIASTOLIC BLOOD PRESSURE: 78 MMHG | RESPIRATION RATE: 20 BRPM | HEART RATE: 94 BPM

## 2022-06-03 DIAGNOSIS — J44.1 CHRONIC OBSTRUCTIVE PULMONARY DISEASE WITH (ACUTE) EXACERBATION: ICD-10-CM

## 2022-06-03 DIAGNOSIS — F17.200 NICOTINE DEPENDENCE, UNSPECIFIED, UNCOMPLICATED: ICD-10-CM

## 2022-06-03 DIAGNOSIS — K21.9 GASTRO-ESOPHAGEAL REFLUX DISEASE WITHOUT ESOPHAGITIS: ICD-10-CM

## 2022-06-03 DIAGNOSIS — Z20.822 CONTACT WITH AND (SUSPECTED) EXPOSURE TO COVID-19: ICD-10-CM

## 2022-06-03 DIAGNOSIS — R05.1 ACUTE COUGH: ICD-10-CM

## 2022-06-03 DIAGNOSIS — K46.9 UNSPECIFIED ABDOMINAL HERNIA WITHOUT OBSTRUCTION OR GANGRENE: Chronic | ICD-10-CM

## 2022-06-03 DIAGNOSIS — R06.02 SHORTNESS OF BREATH: ICD-10-CM

## 2022-06-03 DIAGNOSIS — I49.8 OTHER SPECIFIED CARDIAC ARRHYTHMIAS: ICD-10-CM

## 2022-06-03 DIAGNOSIS — E03.9 HYPOTHYROIDISM, UNSPECIFIED: ICD-10-CM

## 2022-06-03 LAB
ALBUMIN SERPL ELPH-MCNC: 4.5 G/DL — SIGNIFICANT CHANGE UP (ref 3.5–5.2)
ALP SERPL-CCNC: 62 U/L — SIGNIFICANT CHANGE UP (ref 30–115)
ALT FLD-CCNC: 12 U/L — SIGNIFICANT CHANGE UP (ref 0–41)
ANION GAP SERPL CALC-SCNC: 13 MMOL/L — SIGNIFICANT CHANGE UP (ref 7–14)
AST SERPL-CCNC: 14 U/L — SIGNIFICANT CHANGE UP (ref 0–41)
BASE EXCESS BLDV CALC-SCNC: 2 MMOL/L — SIGNIFICANT CHANGE UP (ref -2–3)
BASOPHILS # BLD AUTO: 0.05 K/UL — SIGNIFICANT CHANGE UP (ref 0–0.2)
BASOPHILS NFR BLD AUTO: 0.9 % — SIGNIFICANT CHANGE UP (ref 0–1)
BILIRUB SERPL-MCNC: 0.3 MG/DL — SIGNIFICANT CHANGE UP (ref 0.2–1.2)
BUN SERPL-MCNC: 10 MG/DL — SIGNIFICANT CHANGE UP (ref 10–20)
CA-I SERPL-SCNC: 1.26 MMOL/L — SIGNIFICANT CHANGE UP (ref 1.15–1.33)
CALCIUM SERPL-MCNC: 9.8 MG/DL — SIGNIFICANT CHANGE UP (ref 8.5–10.1)
CHLORIDE SERPL-SCNC: 103 MMOL/L — SIGNIFICANT CHANGE UP (ref 98–110)
CO2 SERPL-SCNC: 23 MMOL/L — SIGNIFICANT CHANGE UP (ref 17–32)
CREAT SERPL-MCNC: 0.9 MG/DL — SIGNIFICANT CHANGE UP (ref 0.7–1.5)
EGFR: 76 ML/MIN/1.73M2 — SIGNIFICANT CHANGE UP
EOSINOPHIL # BLD AUTO: 0.34 K/UL — SIGNIFICANT CHANGE UP (ref 0–0.7)
EOSINOPHIL NFR BLD AUTO: 6.4 % — SIGNIFICANT CHANGE UP (ref 0–8)
GAS PNL BLDV: 137 MMOL/L — SIGNIFICANT CHANGE UP (ref 136–145)
GAS PNL BLDV: SIGNIFICANT CHANGE UP
GLUCOSE SERPL-MCNC: 103 MG/DL — HIGH (ref 70–99)
HCO3 BLDV-SCNC: 28 MMOL/L — SIGNIFICANT CHANGE UP (ref 22–29)
HCT VFR BLD CALC: 37 % — SIGNIFICANT CHANGE UP (ref 37–47)
HCT VFR BLDA CALC: 38 % — LOW (ref 39–51)
HGB BLD CALC-MCNC: 12.6 G/DL — SIGNIFICANT CHANGE UP (ref 12.6–17.4)
HGB BLD-MCNC: 12.3 G/DL — SIGNIFICANT CHANGE UP (ref 12–16)
IMM GRANULOCYTES NFR BLD AUTO: 0.2 % — SIGNIFICANT CHANGE UP (ref 0.1–0.3)
LACTATE BLDV-MCNC: 1.1 MMOL/L — SIGNIFICANT CHANGE UP (ref 0.5–2)
LYMPHOCYTES # BLD AUTO: 1.41 K/UL — SIGNIFICANT CHANGE UP (ref 1.2–3.4)
LYMPHOCYTES # BLD AUTO: 26.4 % — SIGNIFICANT CHANGE UP (ref 20.5–51.1)
MCHC RBC-ENTMCNC: 33.1 PG — HIGH (ref 27–31)
MCHC RBC-ENTMCNC: 33.2 G/DL — SIGNIFICANT CHANGE UP (ref 32–37)
MCV RBC AUTO: 99.5 FL — HIGH (ref 81–99)
MONOCYTES # BLD AUTO: 0.46 K/UL — SIGNIFICANT CHANGE UP (ref 0.1–0.6)
MONOCYTES NFR BLD AUTO: 8.6 % — SIGNIFICANT CHANGE UP (ref 1.7–9.3)
NEUTROPHILS # BLD AUTO: 3.07 K/UL — SIGNIFICANT CHANGE UP (ref 1.4–6.5)
NEUTROPHILS NFR BLD AUTO: 57.5 % — SIGNIFICANT CHANGE UP (ref 42.2–75.2)
NRBC # BLD: 0 /100 WBCS — SIGNIFICANT CHANGE UP (ref 0–0)
NT-PROBNP SERPL-SCNC: 26 PG/ML — SIGNIFICANT CHANGE UP (ref 0–300)
PCO2 BLDV: 50 MMHG — HIGH (ref 39–42)
PH BLDV: 7.36 — SIGNIFICANT CHANGE UP (ref 7.32–7.43)
PLATELET # BLD AUTO: 382 K/UL — SIGNIFICANT CHANGE UP (ref 130–400)
PO2 BLDV: 41 MMHG — SIGNIFICANT CHANGE UP
POTASSIUM BLDV-SCNC: 4.1 MMOL/L — SIGNIFICANT CHANGE UP (ref 3.5–5.1)
POTASSIUM SERPL-MCNC: 4.2 MMOL/L — SIGNIFICANT CHANGE UP (ref 3.5–5)
POTASSIUM SERPL-SCNC: 4.2 MMOL/L — SIGNIFICANT CHANGE UP (ref 3.5–5)
PROT SERPL-MCNC: 7.6 G/DL — SIGNIFICANT CHANGE UP (ref 6–8)
RAPID RVP RESULT: SIGNIFICANT CHANGE UP
RBC # BLD: 3.72 M/UL — LOW (ref 4.2–5.4)
RBC # FLD: 14.5 % — SIGNIFICANT CHANGE UP (ref 11.5–14.5)
SAO2 % BLDV: 66.9 % — SIGNIFICANT CHANGE UP
SARS-COV-2 RNA SPEC QL NAA+PROBE: SIGNIFICANT CHANGE UP
SODIUM SERPL-SCNC: 139 MMOL/L — SIGNIFICANT CHANGE UP (ref 135–146)
TROPONIN T SERPL-MCNC: <0.01 NG/ML — SIGNIFICANT CHANGE UP
WBC # BLD: 5.34 K/UL — SIGNIFICANT CHANGE UP (ref 4.8–10.8)
WBC # FLD AUTO: 5.34 K/UL — SIGNIFICANT CHANGE UP (ref 4.8–10.8)

## 2022-06-03 PROCEDURE — 99285 EMERGENCY DEPT VISIT HI MDM: CPT

## 2022-06-03 PROCEDURE — 71046 X-RAY EXAM CHEST 2 VIEWS: CPT | Mod: 26

## 2022-06-03 PROCEDURE — 93010 ELECTROCARDIOGRAM REPORT: CPT

## 2022-06-03 RX ORDER — DEXAMETHASONE 0.5 MG/5ML
10 ELIXIR ORAL ONCE
Refills: 0 | Status: COMPLETED | OUTPATIENT
Start: 2022-06-03 | End: 2022-06-03

## 2022-06-03 RX ORDER — DEXAMETHASONE 0.5 MG/5ML
5 ELIXIR ORAL
Qty: 5 | Refills: 0
Start: 2022-06-03 | End: 2022-06-03

## 2022-06-03 RX ORDER — ALBUTEROL 90 UG/1
3 AEROSOL, METERED ORAL
Qty: 180 | Refills: 0
Start: 2022-06-03 | End: 2023-02-05

## 2022-06-03 RX ORDER — ALBUTEROL 90 UG/1
3 AEROSOL, METERED ORAL
Qty: 180 | Refills: 0
Start: 2022-06-03 | End: 2022-06-12

## 2022-06-03 RX ORDER — IPRATROPIUM/ALBUTEROL SULFATE 18-103MCG
3 AEROSOL WITH ADAPTER (GRAM) INHALATION
Refills: 0 | Status: COMPLETED | OUTPATIENT
Start: 2022-06-03 | End: 2022-06-03

## 2022-06-03 RX ADMIN — Medication 3 MILLILITER(S): at 07:48

## 2022-06-03 RX ADMIN — Medication 102 MILLIGRAM(S): at 07:47

## 2022-06-03 RX ADMIN — Medication 3 MILLILITER(S): at 07:51

## 2022-06-03 NOTE — ED PROVIDER NOTE - PATIENT PORTAL LINK FT
You can access the FollowMyHealth Patient Portal offered by [facility] by registering at the following website: [email or web address]. By joining [facility]’s FollowMyHealth portal, you will also be able to view your health information using other applications (apps) compatible with our system.

## 2022-06-03 NOTE — ED PROVIDER NOTE - CLINICAL SUMMARY MEDICAL DECISION MAKING FREE TEXT BOX
pt feeling much better, breath sounds improving, no increased WOB. will dc w/ steroids, albuterol, return precautions and f/u instructions

## 2022-06-03 NOTE — ED PROVIDER NOTE - OBJECTIVE STATEMENT
51-year-old female history of COPD, hypothyroid, GERD presents with shortness of breath and cough for 3 days.  Patient notes increased humidity in her apartment and worsening symptoms since then.  Endorses mildly productive cough denies fevers chills nausea vomiting back pain chest pain diarrhea.  Patient has never been intubated for her COPD.  Used to follow with Dr. Bojorquez when he retired currently has an appointment with pulmonology in October.  Has been using albuterol and 1 dose of ipratropium at 2 AM.  Not currently on steroids. speaking full sentences.

## 2022-06-03 NOTE — ED PROVIDER NOTE - PHYSICAL EXAMINATION
Vital Signs: I have reviewed the initial vital signs.  Constitutional: appears stated age, no acute distress.  HEENT: Airway patent, moist MM, no erythema/swelling/deformity of oral structures. EOMI, PERRLA.  CV: regular rate, regular rhythm, well-perfused extremities, 2+ b/l DP and radial pulses equal.  Lungs: bilateral wheezing, no increased WOB.  ABD: NTND, no guarding or rebound, no pulsatile mass, no hernias, no flank pain.   MSK: Neck supple, nontender, nl ROM, no stepoff. Chest nontender. Back nontender in TLS spine or to b/l bony structures. Ext nontender, nl rom, no deformity.   INTEG: Skin warm, dry, no rash.  NEURO: A&Ox3, moving all extremities, normal speech  PSYCH: Calm, cooperative, normal affect and interaction.

## 2022-06-03 NOTE — ED PROVIDER NOTE - CARE PROVIDER_API CALL
Abel Ramos)  Critical Care Medicine; Internal Medicine; Pulmonary Disease; Sleep Medicine  [address] [place] [address]  Phone: [phone]  Fax: [phone]  Follow Up Time: 4-6 Days

## 2022-06-03 NOTE — ED ADULT NURSE NOTE - OBJECTIVE STATEMENT
patient complaints of SOB and nonproductive cough for 1 day. Patient reports mid chest discomfort.  Patient denies n/v, fever.  Patient OSAt=99% on room air. Patient reports being vaccinates and booster.  Patient reports PMH of COPD.

## 2022-06-03 NOTE — ED PROVIDER NOTE - NSFOLLOWUPINSTRUCTIONS_ED_ALL_ED_FT
Chronic obstructive pulmonary disease (COPD) is a disease that limits the flow of air in and out of your lungs. This makes it harder to breathe. With COPD, you are also more likely to get lung infections. COPD includes chronic bronchitis and emphysema. COPD is most often caused by heavy, long-term cigarette smoking. If you smoke, quit. It is the best thing you can do for your COPD and your overall health. To avoid infections, wash your hands often. Do your best to keep your hands away from your face. Most germs are spread from your hands to your mouth. Get a flu shot every year. Also ask your provider about pneumonia vaccines. To stay healthy, get enough sleep, exercise regularly, and eat a balanced diet.    Take your medicines exactly as directed. Don't skip doses.    Call your provider immediately if you have any of the following:  Shortness of breath, wheezing, or coughing  Increased mucus  Yellow, green, bloody, or smelly mucus  Fever or chills  Tightness in your chest that does not go away with rest or medicine  An irregular heartbeat or a feeling that your heart is beating very fast  Swollen ankles.

## 2022-06-03 NOTE — ED PROVIDER NOTE - ATTENDING CONTRIBUTION TO CARE
51-year-old female history of COPD, hypothyroid, GERD presents for eval of shortness of breath and cough x3 days. cough mildly productive. no fever. + increase in wheezing.  pt taking albuterol/ipratropium for sx. no cp/syncope/nausea/vomiting    vss  gen- NAD, aaox3  card-rrr  lungs-diffuse wheezing, no resp distress, speaking full sentences  abd-sntnd, no guarding or rebound  neuro- full str/sensation, cn ii-xii grossly intact, normal coordination     likely copd triggered by uri vs pna  will get screening labs, xr, ekg/trop  will provide supportive care, serial exam and ED observation period

## 2022-06-13 ENCOUNTER — EMERGENCY (EMERGENCY)
Facility: HOSPITAL | Age: 52
LOS: 0 days | Discharge: HOME | End: 2022-06-13
Attending: EMERGENCY MEDICINE | Admitting: EMERGENCY MEDICINE
Payer: MEDICAID

## 2022-06-13 VITALS
WEIGHT: 119.93 LBS | SYSTOLIC BLOOD PRESSURE: 121 MMHG | HEART RATE: 90 BPM | HEIGHT: 65 IN | RESPIRATION RATE: 18 BRPM | DIASTOLIC BLOOD PRESSURE: 69 MMHG | TEMPERATURE: 98 F | OXYGEN SATURATION: 99 %
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
## 2022-06-13 DIAGNOSIS — J44.9 CHRONIC OBSTRUCTIVE PULMONARY DISEASE, UNSPECIFIED: ICD-10-CM

## 2022-06-13 DIAGNOSIS — K21.9 GASTRO-ESOPHAGEAL REFLUX DISEASE WITHOUT ESOPHAGITIS: ICD-10-CM

## 2022-06-13 DIAGNOSIS — M53.3 SACROCOCCYGEAL DISORDERS, NOT ELSEWHERE CLASSIFIED: ICD-10-CM

## 2022-06-13 DIAGNOSIS — K46.9 UNSPECIFIED ABDOMINAL HERNIA WITHOUT OBSTRUCTION OR GANGRENE: Chronic | ICD-10-CM
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
## 2022-06-13 DIAGNOSIS — S39.92XA UNSPECIFIED INJURY OF LOWER BACK, INITIAL ENCOUNTER: ICD-10-CM

## 2022-06-13 DIAGNOSIS — Z86.2 PERSONAL HISTORY OF DISEASES OF THE BLOOD AND BLOOD-FORMING ORGANS AND CERTAIN DISORDERS INVOLVING THE IMMUNE MECHANISM: ICD-10-CM

## 2022-06-13 DIAGNOSIS — E05.90 THYROTOXICOSIS, UNSPECIFIED WITHOUT THYROTOXIC CRISIS OR STORM: ICD-10-CM

## 2022-06-13 DIAGNOSIS — M25.511 PAIN IN RIGHT SHOULDER: ICD-10-CM

## 2022-06-13 DIAGNOSIS — Z91.041 RADIOGRAPHIC DYE ALLERGY STATUS: ICD-10-CM

## 2022-06-13 DIAGNOSIS — Z87.19 PERSONAL HISTORY OF OTHER DISEASES OF THE DIGESTIVE SYSTEM: ICD-10-CM
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
## 2022-06-13 DIAGNOSIS — E03.9 HYPOTHYROIDISM, UNSPECIFIED: ICD-10-CM
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
## 2022-06-13 PROCEDURE — 73030 X-RAY EXAM OF SHOULDER: CPT | Mod: 26,RT

## 2022-06-13 PROCEDURE — 73060 X-RAY EXAM OF HUMERUS: CPT | Mod: 26,RT

## 2022-06-13 PROCEDURE — 72220 X-RAY EXAM SACRUM TAILBONE: CPT | Mod: 26

## 2022-06-13 PROCEDURE — 99284 EMERGENCY DEPT VISIT MOD MDM: CPT

## 2022-06-13 RX ORDER — KETOROLAC TROMETHAMINE 30 MG/ML
30 SYRINGE (ML) INJECTION ONCE
Refills: 0 | Status: DISCONTINUED | OUTPATIENT
Start: 2022-06-13 | End: 2022-06-13

## 2022-06-13 RX ADMIN — Medication 30 MILLIGRAM(S): at 20:22

## 2022-06-13 NOTE — ED PROVIDER NOTE - OBJECTIVE STATEMENT
51 year old F with hx of copd, hyperthyroid, gerd, Methimazole, Fe def anemia presenting to er s/p fall. States was mopping floor at home slipped and hit tailbone, right shoulder and rt side of head.  No LOC.  Patient not on any anticoagulation.  She is now complaining of right shoulder pain and tailbone pain. Patient denies any headache, neck pain, nausea, vomiting, numbness, bowel or bladder incontinence, bruising, abrasions.

## 2022-06-13 NOTE — ED ADULT TRIAGE NOTE - CHIEF COMPLAINT QUOTE
Pt BIBA from home, was mopping slipped on wet floor onto her backside. Pt c/o coccyx and right shoulder pain. Pt able to stand/ambulate after fall, denies LOC. Not on anticoag.

## 2022-06-13 NOTE — ED PROVIDER NOTE - ATTENDING APP SHARED VISIT CONTRIBUTION OF CARE
51-year-old female past medical history COPD hypothyroid GERD anemia umbilical hernia, presents with fall.  Patient states she was mopping slipped fell and landed on her right shoulder and tailbone.  Hit her head on a soft door.  Complains of right shoulder and tailbone pain.  Constant sharp nonradiating.  Worse with movement or sitting.  No LOC or blood thinners.  No headache numbness weakness blurry vision.  No chest pain shortness of breath.    On exam, AFVSS, Well appearing, No acute distress, NCAT, EOMI, PERRLA, MMM, Neck supple, aaox3, CN 2-12 intact, No nystagmus.  5/5 motor x 4 ext, SILT x 4 extremities, No facial droop or slurred speech. No pronator drift. No midline C/T/L tenderness to palpation or step off.  Coccygeal tenderness to palpation., No LE edema or calf TTP, right shoulder diffuse tenderness to palpation, no deformities, 5 out of 5 motor, sensation intact to light touch, 2+ radial pulse    A/P; status post fall, right shoulder and coccyx injury, no acute fracture seen on x-ray on [facility], [place] home with Motrin Tylenol ice, supportive care, strict return precautions provided, follow-up PMD/Ortho 1 to 2 weeks

## 2022-06-13 NOTE — ED PROVIDER NOTE - CLINICAL SUMMARY MEDICAL DECISION MAKING FREE TEXT BOX
A/P; status post fall, right shoulder and coccyx injury, no acute fracture seen on x-ray on my eval, DC home with Motrin Tylenol ice, supportive care, strict return precautions provided, follow-up PMD/Ortho 1 to 2 weeks

## 2022-06-13 NOTE — ED PROVIDER NOTE - NSFOLLOWUPINSTRUCTIONS_ED_ALL_ED_FT
Shoulder Pain    Many things can cause shoulder pain, including:    An injury to the area.  Overuse of the shoulder.  Arthritis.    The source of the pain can be:    Inflammation.  An injury to the shoulder joint.  An injury to a tendon, ligament, or bone.    HOME CARE INSTRUCTIONS  Take these actions to help with your pain:     Squeeze a soft ball or a foam pad as much as possible. This helps to keep the shoulder from swelling. It also helps to strengthen the arm.  Take over-the-counter and prescription medicines only as told by your health care provider.  If directed, apply ice to the area:  Put ice in a plastic bag.  Place a towel between your skin and the bag.  Leave the ice on for 20 minutes, 2–3 times per day. Stop applying ice if it does not help with the pain.  If you were given a shoulder sling or immobilizer:  Wear it as told.  Remove it to shower or bathe.  Move your arm as little as possible, but keep your hand moving to prevent swelling.    SEEK MEDICAL CARE IF:  Your pain gets worse.  Your pain is not relieved with medicines.  New pain develops in your arm, hand, or fingers.    SEEK IMMEDIATE MEDICAL CARE IF:  Your arm, hand, or fingers:  Tingle.  Become numb.  Become swollen.  Become painful.  Turn white or blue.    ADDITIONAL NOTES AND INSTRUCTIONS    Please follow up with your Primary MD in 24-48 hr.  Seek immediate medical care for any new/worsening signs or symptoms.    Back Pain    Back pain is very common in adults. The cause of back pain is rarely dangerous and the pain often gets better over time. The cause of your back pain may not be known and may include strain of muscles or ligaments, degeneration of the spinal disks, or arthritis. Occasionally the pain may radiate down your leg(s). Over-the-counter medicines to reduce pain and inflammation are often the most helpful. Stretching and remaining active frequently helps the healing process.     SEEK IMMEDIATE MEDICAL CARE IF YOU HAVE ANY OF THE FOLLOWING SYMPTOMS: bowel or bladder control problems, unusual weakness or numbness in your arms or legs, nausea or vomiting, abdominal pain, fever, dizziness/lightheadedness.

## 2022-06-13 NOTE — ED PROVIDER NOTE - PATIENT PORTAL LINK FT
You can access the FollowMyHealth Patient Portal offered by [facility] by registering at the following website: [email or web address]. By joining [facility]’s FollowMyHealth portal, you will also be able to view your health information using other applications (apps) compatible with our system.

## 2022-06-13 NOTE — ED PROVIDER NOTE - CARE PROVIDER_API CALL
Sajan Williamson (MD)  Orthopaedic Surgery  [address] [place] [address]  Phone: [phone]  Fax: [phone]  Follow Up Time:

## 2022-06-13 NOTE — ED PROVIDER NOTE - PHYSICAL EXAMINATION
CONSTITUTIONAL: Well-appearing; well-nourished; in no apparent distress.   EYES: PERRL; EOM intact.   ENT: normal nose; no rhinorrhea; normal pharynx with no tonsillar hypertrophy.   NECK: Supple; non-tender; no cervical lymphadenopathy.   CARDIOVASCULAR: Normal S1, S2; no murmurs, rubs, or gallops.   RESPIRATORY: Normal chest excursion with respiration; breath sounds clear and equal bilaterally; no wheezes, rhonchi, or rales.  GI/: Normal bowel sounds; non-distended; non-tender; no palpable organomegaly.   MS: No evidence of trauma or deformity. stable pelvis. + ttp to rt distal clavicle and anterior shoulder. + ttp to coccyx. Distal pulses intact  SKIN: Normal for age and race; no abrasions, lacerations, ecchymosis  NEURO/PSYCH: A & O x 4; grossly unremarkable. mood and manner are appropriate. Grooming and personal hygiene are appropriate. no focal deficits. neurovascular intact. Sensation intact

## 2022-06-13 NOTE — ED PROVIDER NOTE - NS ED ROS FT
Constitutional: no fever, chills, no recent weight loss, change in appetite or malaise  Eyes: no redness/discharge/pain/vision changes  ENT: no rhinorrhea/ear pain/sore throat  Cardiac: No chest pain, SOB or edema.  Respiratory: No cough or respiratory distress  GI: No nausea, vomiting, diarrhea or abdominal pain.  : No dysuria, frequency, urgency or hematuria  MS: + rt shoulder pain/rt tailbone marino  Neuro: No headache or weakness. No LOC.  Skin: No skin rash, bruising  Endocrine: + hx of thyroid dx  Except as documented in the HPI, all other systems are negative.

## 2022-07-12 NOTE — ED ADULT NURSE NOTE - NSIMPLEMENTINTERV_GEN_ALL_ED
Implemented All Universal Safety Interventions:  [place] to call system. Call bell, personal items and telephone within reach. Instruct patient to call for assistance. Room bathroom lighting operational. Non-slip footwear when patient is off stretcher. Physically safe environment: no spills, clutter or unnecessary equipment. Stretcher in lowest position, wheels locked, appropriate side rails in place.
no

## 2022-07-13 NOTE — ED ADULT TRIAGE NOTE - AS O2 DELIVERY
Pt mother is calling Pt  Is on amoxaclin for 6 days   for ear infection Pt has  Congestion  And Mom isn't sure if Pt is wheezing  That was this morning , she not sure congestion or wheezing , room air

## 2022-08-06 ENCOUNTER — EMERGENCY (EMERGENCY)
Facility: HOSPITAL | Age: 52
LOS: 0 days | Discharge: HOME | End: 2022-08-06
Attending: EMERGENCY MEDICINE | Admitting: EMERGENCY MEDICINE

## 2022-08-06 VITALS
HEIGHT: 65 IN | OXYGEN SATURATION: 99 % | SYSTOLIC BLOOD PRESSURE: 110 MMHG | HEART RATE: 83 BPM | RESPIRATION RATE: 22 BRPM | TEMPERATURE: 98 F | DIASTOLIC BLOOD PRESSURE: 79 MMHG | WEIGHT: 119.93 LBS

## 2022-08-06 DIAGNOSIS — F17.200 NICOTINE DEPENDENCE, UNSPECIFIED, UNCOMPLICATED: ICD-10-CM

## 2022-08-06 DIAGNOSIS — J44.9 CHRONIC OBSTRUCTIVE PULMONARY DISEASE, UNSPECIFIED: ICD-10-CM

## 2022-08-06 DIAGNOSIS — R06.02 SHORTNESS OF BREATH: ICD-10-CM

## 2022-08-06 DIAGNOSIS — Z20.822 CONTACT WITH AND (SUSPECTED) EXPOSURE TO COVID-19: ICD-10-CM

## 2022-08-06 DIAGNOSIS — R05.9 COUGH, UNSPECIFIED: ICD-10-CM

## 2022-08-06 DIAGNOSIS — K21.9 GASTRO-ESOPHAGEAL REFLUX DISEASE WITHOUT ESOPHAGITIS: ICD-10-CM

## 2022-08-06 DIAGNOSIS — E05.90 THYROTOXICOSIS, UNSPECIFIED WITHOUT THYROTOXIC CRISIS OR STORM: ICD-10-CM

## 2022-08-06 DIAGNOSIS — Z91.041 RADIOGRAPHIC DYE ALLERGY STATUS: ICD-10-CM

## 2022-08-06 DIAGNOSIS — K46.9 UNSPECIFIED ABDOMINAL HERNIA WITHOUT OBSTRUCTION OR GANGRENE: Chronic | ICD-10-CM

## 2022-08-06 DIAGNOSIS — R06.2 WHEEZING: ICD-10-CM

## 2022-08-06 DIAGNOSIS — D64.9 ANEMIA, UNSPECIFIED: ICD-10-CM

## 2022-08-06 LAB — SARS-COV-2 RNA SPEC QL NAA+PROBE: SIGNIFICANT CHANGE UP

## 2022-08-06 PROCEDURE — 99284 EMERGENCY DEPT VISIT MOD MDM: CPT

## 2022-08-06 PROCEDURE — 71045 X-RAY EXAM CHEST 1 VIEW: CPT | Mod: 26

## 2022-08-06 RX ORDER — IPRATROPIUM/ALBUTEROL SULFATE 18-103MCG
3 AEROSOL WITH ADAPTER (GRAM) INHALATION
Refills: 0 | Status: DISCONTINUED | OUTPATIENT
Start: 2022-08-06 | End: 2022-08-06

## 2022-08-06 RX ORDER — IBUPROFEN 200 MG
1 TABLET ORAL
Qty: 28 | Refills: 0
Start: 2022-08-06 | End: 2022-08-12

## 2022-08-06 RX ORDER — IBUPROFEN 200 MG
600 TABLET ORAL ONCE
Refills: 0 | Status: COMPLETED | OUTPATIENT
Start: 2022-08-06 | End: 2022-08-06

## 2022-08-06 RX ADMIN — Medication 600 MILLIGRAM(S): at 09:56

## 2022-08-06 RX ADMIN — Medication 60 MILLIGRAM(S): at 09:57

## 2022-08-06 RX ADMIN — Medication 3 MILLILITER(S): at 09:57

## 2022-08-06 RX ADMIN — Medication 3 MILLILITER(S): at 11:07

## 2022-08-06 NOTE — ED PROVIDER NOTE - NS ED ROS FT
Constitutional: Negative for fever, chills, and fatigue.  HENT: Negative for headache,   Eyes: Negative for eye pain, and vision change.  Cardiovascular: Negative for chest pain, and palpitation.  Respiratory: + SOB and cough. Negative for wheezing,  Gastrointestinal: Negative for nausea, vomiting, abdominal pain  Genitourinary: Negative for flank pain, dysuria, frequency, and hematuria.  Neurological: Negative for dizziness, syncope, and loss of consciousness.  Hematological: Does not bruise/bleed easily.

## 2022-08-06 NOTE — ED PROVIDER NOTE - PHYSICAL EXAMINATION
CONSTITUTIONAL: Well-appearing; in no apparent distress.   HEAD: Normocephalic; atraumatic.   ENT: Hearing is intact with good acuity to spoken voice. Patient is speaking clearly, not muffled and airway is intact.   RESPIRATORY: No signs of respiratory distress. Lung sounds are clear in all lobes bilaterally without rales, rhonchi, or wheezes.  CARDIOVASCULAR: Regular rate and rhythm.   GI: Abdomen is soft, non-tender, and without distention. Bowel sounds are present and normoactive in all four quadrants. No masses are noted.   NEURO: A & O x 3. Normal speech.   PSYCHOLOGICAL: Appropriate mood and affect. Good judgement and insight.

## 2022-08-06 NOTE — ED PROVIDER NOTE - PROGRESS NOTE DETAILS
Meds given and symptom improved significantly. Meds sent to the pharmacy. Pt is stable for discharge.

## 2022-08-06 NOTE — ED PROVIDER NOTE - CLINICAL SUMMARY MEDICAL DECISION MAKING FREE TEXT BOX
Patient presented with cough and dyspnea. Patient states she has a hx COPD and this feels the same as prior exacerbations. Otherwise on arrival patient afebrile, HD stable, (+) mild wheezing b/l but normal O2 saturation, no respiratory distress. Chest xray negative for pneumothorax, pneumonia, widened mediastinum, evidence of rib fractures, enlarged cardiac silhouette or any other emergent pathologies. COVID swab sent. Patient tx in ED with complete resolution of wheezing. Patient able to ambulate without desaturation. Given the above, will discharge home with outpatient follow up. Patient agreeable with plan. Agrees to return to ED for any new or worsening symptoms.

## 2022-08-06 NOTE — ED ADULT NURSE NOTE - OBJECTIVE STATEMENT
Patient complaining of shortness of breath and cough x1 week. Has history of COPD and feels like she needs steroids. Pt denies any chest pain or discomfort. Airway patent. No signs of distress noted.

## 2022-08-06 NOTE — ED PROVIDER NOTE - OBJECTIVE STATEMENT
Pt is a 51-year-old female with past medical history of anemia, COPD, GERD, and hypothyroidism who presented to with cough and shortness of breath.  Reports that she has history of COPD and the symptoms are very similar with her previous COPD exacerbation.  Reports that symptoms started around 7 days ago.  Reports that she has been using nebulizer, and inhaler at home, but did not provide her with much relief.  Reports that steroids normally works for her.  Denies recent sick contact.  Denies fever, chest pain, nausea, vomiting, abdominal pain, and change in bowel movement.

## 2022-08-06 NOTE — ED PROVIDER NOTE - PATIENT PORTAL LINK FT
----- Message from Regina Weber sent at 5/7/2020  9:57 AM EDT -----  Regarding: RAJINDER Thompson/Telephone  Caller's first and last name: N/A  Reason for call: Pt stated that she would like to be prescribed Gabapentin again. She stated Lyrica is not working.    Callback required yes/no and why: Yes  Best contact number(s): [phone]  Details to clarify the request: N/A You can access the FollowMyHealth Patient Portal offered by [facility] by registering at the following website: [email or web address]. By joining [facility]’s FollowMyHealth portal, you will also be able to view your health information using other applications (apps) compatible with our system.

## 2022-08-06 NOTE — ED ADULT TRIAGE NOTE - CHIEF COMPLAINT QUOTE
Patient complaining of shortness of breath and cough x1 week. Has history of COPD and feels like she needs steroids.

## 2022-08-19 ENCOUNTER — NON-APPOINTMENT (OUTPATIENT)
Age: 52
End: 2022-08-19

## 2022-09-13 ENCOUNTER — APPOINTMENT (OUTPATIENT)
Dept: PULMONOLOGY | Facility: CLINIC | Age: 52
End: 2022-09-13

## 2022-09-20 ENCOUNTER — OUTPATIENT (OUTPATIENT)
Dept: OUTPATIENT SERVICES | Facility: HOSPITAL | Age: 52
LOS: 1 days | Discharge: HOME | End: 2022-09-20

## 2022-09-20 ENCOUNTER — NON-APPOINTMENT (OUTPATIENT)
Age: 52
End: 2022-09-20

## 2022-09-20 ENCOUNTER — APPOINTMENT (OUTPATIENT)
Dept: INTERNAL MEDICINE | Facility: CLINIC | Age: 52
End: 2022-09-20

## 2022-09-20 VITALS
OXYGEN SATURATION: 98 % | SYSTOLIC BLOOD PRESSURE: 163 MMHG | WEIGHT: 127 LBS | BODY MASS INDEX: 21.16 KG/M2 | HEART RATE: 104 BPM | HEIGHT: 65 IN | DIASTOLIC BLOOD PRESSURE: 112 MMHG | TEMPERATURE: 98.1 F

## 2022-09-20 DIAGNOSIS — N92.0 EXCESSIVE AND FREQUENT MENSTRUATION WITH REGULAR CYCLE: ICD-10-CM

## 2022-09-20 DIAGNOSIS — N84.0 POLYP OF CORPUS UTERI: ICD-10-CM

## 2022-09-20 DIAGNOSIS — K46.9 UNSPECIFIED ABDOMINAL HERNIA WITHOUT OBSTRUCTION OR GANGRENE: Chronic | ICD-10-CM

## 2022-09-20 PROCEDURE — 99214 OFFICE O/P EST MOD 30 MIN: CPT | Mod: GC

## 2022-09-20 NOTE — PHYSICAL EXAM
[Normal Outer Ear/Nose] : the outer ears and nose were normal in appearance [No JVD] : no jugular venous distention [Clear to Auscultation] : lungs were clear to auscultation bilaterally [Normal] : no joint swelling and grossly normal strength and tone [No Acute Distress] : no acute distress [Well Nourished] : well nourished [de-identified] : low mood  [de-identified] : depressed; low mood

## 2022-09-20 NOTE — HISTORY OF PRESENT ILLNESS
[FreeTextEntry1] : routine f/u [de-identified] : Mrs. Pedraza 51 yr old female with PMHx of copd, gerd, hyperthyroidism here to for a follow up visit. \par \par Reports is depressed and has been drinking and using marijuana recently due to stress from taking care of her child and being unemployed. Reports no suicidal ideations and requesting psych referral, as well as surgery referral for revision umbilical hernia repair. \par \par Patient hypertensive today 163/112: reports no headache, vision changes, chest pain, sob.

## 2022-09-20 NOTE — ASSESSMENT
[FreeTextEntry1] : Mrs. Pedraza 51 yr old female with PMHx of copd, gerd, hyperthyroidism, microcytic anemia, cervical polyp s/p resection, umbilical hernia s/p surgery in 2010 here to for a follow up visit. \par \par #COPD\par - c/w with inhalers\par - medications refilled\par - no hx of exacerbations\par - pulm referral given for clearance for colonoscopy \par \par #hyperthyroid on methimazole\par - TSH 0.45 (5/2022)\par - f/u repeat TSH and free t4 levels\par \par #GERD\par - c/w ppi\par \par #depression PHQ 20\par - no suicidal ideations\par -  consulted for urgent psych appt \par - psych referral and therapist referral given \par \par #Vit D deficiency\par - level 24 (5/2022)\par - advised on OTC supplements\par \par #hypertensive today 163/112:\par - reports no headache, vision changes, chest pain, sob. \par - will start on amlodipine 5mg daily \par \par # Microcytic anemia\par #possible adenomyosis \par # CRC screening\par - hgb 7.4 in July 2021\par - Pt had heavy and prolonged menses : on iron therapy; pending heme/onc follow up\par - has to follow up with drea next month has appointment scheduled \par - never had colonoscopy before; however pulm clearance needed prior to procedure\par - referral given for pulm\par \par #umbical hernia \par - referral given for possible revision surgery \par \par # GERD\par - Symptoms controlled \par - cw PPI\par - counseled on dietary and lifestyle modification\par \par #HCM\par - Mammogram referral given \par - rto in 6months and prn

## 2022-09-20 NOTE — REVIEW OF SYSTEMS
Note Text (......Xxx Chief Complaint.): This diagnosis correlates with Other (Free Text): 3 mm and without suspicious appearance Detail Level: Detailed [Shortness Of Breath] : shortness of breath [Wheezing] : wheezing [Negative] : Musculoskeletal

## 2022-09-21 DIAGNOSIS — N92.0 EXCESSIVE AND FREQUENT MENSTRUATION WITH REGULAR CYCLE: ICD-10-CM

## 2022-09-21 DIAGNOSIS — Z00.00 ENCOUNTER FOR GENERAL ADULT MEDICAL EXAMINATION WITHOUT ABNORMAL FINDINGS: ICD-10-CM

## 2022-09-21 DIAGNOSIS — N84.0 POLYP OF CORPUS UTERI: ICD-10-CM

## 2022-09-21 DIAGNOSIS — E05.90 THYROTOXICOSIS, UNSPECIFIED WITHOUT THYROTOXIC CRISIS OR STORM: ICD-10-CM

## 2022-09-21 DIAGNOSIS — E55.9 VITAMIN D DEFICIENCY, UNSPECIFIED: ICD-10-CM

## 2022-09-21 DIAGNOSIS — J44.9 CHRONIC OBSTRUCTIVE PULMONARY DISEASE, UNSPECIFIED: ICD-10-CM

## 2022-09-21 DIAGNOSIS — D50.9 IRON DEFICIENCY ANEMIA, UNSPECIFIED: ICD-10-CM

## 2022-09-21 DIAGNOSIS — K21.9 GASTRO-ESOPHAGEAL REFLUX DISEASE WITHOUT ESOPHAGITIS: ICD-10-CM

## 2022-09-23 ENCOUNTER — NON-APPOINTMENT (OUTPATIENT)
Age: 52
End: 2022-09-23

## 2022-09-27 ENCOUNTER — NON-APPOINTMENT (OUTPATIENT)
Age: 52
End: 2022-09-27

## 2022-09-30 ENCOUNTER — NON-APPOINTMENT (OUTPATIENT)
Age: 52
End: 2022-09-30

## 2022-09-30 ENCOUNTER — OUTPATIENT (OUTPATIENT)
Dept: OUTPATIENT SERVICES | Facility: HOSPITAL | Age: 52
LOS: 1 days | Discharge: HOME | End: 2022-09-30

## 2022-09-30 ENCOUNTER — APPOINTMENT (OUTPATIENT)
Dept: PULMONOLOGY | Facility: CLINIC | Age: 52
End: 2022-09-30

## 2022-09-30 VITALS
BODY MASS INDEX: 21.16 KG/M2 | SYSTOLIC BLOOD PRESSURE: 130 MMHG | DIASTOLIC BLOOD PRESSURE: 84 MMHG | TEMPERATURE: 98 F | HEIGHT: 65 IN | WEIGHT: 127 LBS | HEART RATE: 74 BPM | OXYGEN SATURATION: 99 %

## 2022-09-30 DIAGNOSIS — R06.01 ORTHOPNEA: ICD-10-CM

## 2022-09-30 DIAGNOSIS — F17.200 NICOTINE DEPENDENCE, UNSPECIFIED, UNCOMPLICATED: ICD-10-CM

## 2022-09-30 DIAGNOSIS — J44.9 CHRONIC OBSTRUCTIVE PULMONARY DISEASE, UNSPECIFIED: ICD-10-CM

## 2022-09-30 DIAGNOSIS — R09.82 POSTNASAL DRIP: ICD-10-CM

## 2022-09-30 DIAGNOSIS — K46.9 UNSPECIFIED ABDOMINAL HERNIA WITHOUT OBSTRUCTION OR GANGRENE: Chronic | ICD-10-CM

## 2022-09-30 PROCEDURE — 99214 OFFICE O/P EST MOD 30 MIN: CPT | Mod: GC

## 2022-09-30 RX ORDER — AMLODIPINE BESYLATE 5 MG/1
5 TABLET ORAL
Qty: 30 | Refills: 2 | Status: DISCONTINUED | COMMUNITY
Start: 2022-09-20 | End: 2022-09-30

## 2022-09-30 NOTE — PHYSICAL EXAM
[No Acute Distress] : no acute distress [Well Nourished] : well nourished [Well Developed] : well developed [Well-Appearing] : well-appearing [Normal Sclera/Conjunctiva] : normal sclera/conjunctiva [PERRL] : pupils equal round and reactive to light [EOMI] : extraocular movements intact [Normal Outer Ear/Nose] : the outer ears and nose were normal in appearance [Normal Oropharynx] : the oropharynx was normal [No JVD] : no jugular venous distention [Supple] : supple [No Lymphadenopathy] : no lymphadenopathy [No Respiratory Distress] : no respiratory distress  [Clear to Auscultation] : lungs were clear to auscultation bilaterally [No Accessory Muscle Use] : no accessory muscle use [Normal Rate] : normal rate  [Regular Rhythm] : with a regular rhythm [Normal S1, S2] : normal S1 and S2 [No Carotid Bruits] : no carotid bruits [No Varicosities] : no varicosities [Pedal Pulses Present] : the pedal pulses are present [No Edema] : there was no peripheral edema [No Extremity Clubbing/Cyanosis] : no extremity clubbing/cyanosis [Soft] : abdomen soft [Non Tender] : non-tender [Non-distended] : non-distended [Normal Bowel Sounds] : normal bowel sounds [Normal Posterior Cervical Nodes] : no posterior cervical lymphadenopathy [Normal Anterior Cervical Nodes] : no anterior cervical lymphadenopathy [No CVA Tenderness] : no CVA  tenderness [No Spinal Tenderness] : no spinal tenderness [No Joint Swelling] : no joint swelling [Grossly Normal Strength/Tone] : grossly normal strength/tone [No Rash] : no rash [Normal Gait] : normal gait [Coordination Grossly Intact] : coordination grossly intact [No Focal Deficits] : no focal deficits [Deep Tendon Reflexes (DTR)] : deep tendon reflexes were 2+ and symmetric [Normal Affect] : the affect was normal [Normal Insight/Judgement] : insight and judgment were intact

## 2022-09-30 NOTE — COUNSELING
[Weight management counseling provided] : Weight management [Healthy eating counseling provided] : healthy eating [Activity counseling provided] : activity [Discussed Risks and Advised to Quit Smoking] : Discussed risks and advised to quit smoking [Cessation strategies including cessation program discussed] : Cessation strategies including cessation program discussed [Use of nicotine replacement therapies and other medications discussed] : Use of nicotine replacement therapies and other medications discussed [Yes] : Willing to quit smoking

## 2022-10-03 ENCOUNTER — NON-APPOINTMENT (OUTPATIENT)
Age: 52
End: 2022-10-03

## 2022-10-17 ENCOUNTER — APPOINTMENT (OUTPATIENT)
Dept: SURGERY | Facility: CLINIC | Age: 52
End: 2022-10-17

## 2022-10-17 ENCOUNTER — APPOINTMENT (OUTPATIENT)
Dept: OBGYN | Facility: CLINIC | Age: 52
End: 2022-10-17

## 2022-10-28 ENCOUNTER — APPOINTMENT (OUTPATIENT)
Dept: SURGERY | Facility: CLINIC | Age: 52
End: 2022-10-28
Payer: MEDICAID

## 2022-10-28 VITALS
BODY MASS INDEX: 21.08 KG/M2 | HEART RATE: 97 BPM | WEIGHT: 126.5 LBS | SYSTOLIC BLOOD PRESSURE: 126 MMHG | OXYGEN SATURATION: 98 % | DIASTOLIC BLOOD PRESSURE: 70 MMHG | HEIGHT: 65 IN | TEMPERATURE: 97.2 F

## 2022-10-28 PROCEDURE — 99202 OFFICE O/P NEW SF 15 MIN: CPT

## 2022-10-28 PROCEDURE — 99406 BEHAV CHNG SMOKING 3-10 MIN: CPT

## 2022-10-28 NOTE — ASSESSMENT
[FreeTextEntry1] : 51 y.o female patient with PMH of hyperthyroidism, COPD/asthma and Post nasal drip presents to the office follow up and preop risk stratification. \par \par # COPD - uncontrolled\par - one exacerbation in last year\par - c.w Advair and albuterol/ipratropium prn\par - c/w sigulair\par - will add spiriva\par - Patient instructed that if symptoms worsen, to present to the ER\par - Recommended smoking cessation\par - Last PFT 3 years ago - irreversible airway obstruction\par - will need repeat PFT \par - cardio referral for evaluation for possible cardiac cause for orthopnea/PND\par - vaccinated against covid\par - refused Pneumonia vaccination\par - planning to get flu vaccine this year\par \par # suspected sleep apnea\par - Doesn't get good sleep at night and wakes up multiple times feeling short of breath\par - home sleep study\par \par # Post nasal drip - c/w resume Flonase as needed\par \par # Pre-op eval for abdominal hernia repair\par - Perioperative chest PT, incentive spirometry advised\par - ARISCAT score 18\par - Low risk of perioperative pulmonary complication\par - Needs cardio eval for eval of cardiac cause for PND/orthopnea\par \par # Current daily smoker\par - smoking cessation advised\par - discussed the need for screening CT Chest given smoking history - patient agrees with the test\par \par # RTC In 3 months

## 2022-10-28 NOTE — END OF VISIT
[] : Resident [FreeTextEntry3] : Seen and examined with the resident.\par Impression and plan are my own.\par She has COPD, still smoking. \par No active exacerbation at the moment but symptoms remains uncontrolled. \par C/Q with Advair for now; Add spiriva; check PFTs; LDCT for lung cancer screening. \par Smoking cessation emphasized. \par She also endorses witnessed apneas, snores loudly at night and has poor sleep qualitie and could have BARON; home sleep study ordered. \par No contraindications for hernia surgery from a pulmonary standpoint. \par She does have orthopnea and should be evaluated by cardiology as well. \par F/U in 3 months.

## 2022-10-28 NOTE — PHYSICAL EXAM
[Normal Breath Sounds] : Normal breath sounds [Normal Heart Sounds] : normal heart sounds [No Rash or Lesion] : No rash or lesion [Alert] : alert [Oriented to Person] : oriented to person [Oriented to Place] : oriented to place [Oriented to Time] : oriented to time [Calm] : calm [de-identified] : nad, thin female [de-identified] : no masses [de-identified] : thin, nontender, reducible approx 4cm ventral hernia just to the right of her umbilicus

## 2022-10-28 NOTE — PLAN
[FreeTextEntry1] : Plan to repair hernia at the time of hysterectomy. Encouraged to stop smoking, would prefer her to stop smoking at least 2 weeks before surgery. \par I spoke with Dr De La O and he was planning a vaginal approach but if I am making an incision then he would prefer to use the same incisions. I would prefer a robotic RICARDO approach. Dr De La O agrees that we could do a combined robotic hysterectomy with RICARDO hernia repair. \par \par Once the patient has seen Dr De La O we will figure out a date to schedule surgery and I will seethe pt back for a pre-op visit. \par \par Rosario Navarro MD

## 2022-10-28 NOTE — HISTORY OF PRESENT ILLNESS
[Current] : current [Daytime Somnolence] : daytime somnolence [Fatigue] : fatigue [TextBox_4] : 52 yo F with PMH of hyperthyroidism, COPD/asthma and Post nasal drip presented to the office for follow up - last visit 3 years ago. She state that she has been on Duoneb nebulizers - 3-4 times/day; albuterol - 2 times/day when outside, Advair 250/50 - BID, Singulair 10mg HS, Flonase spray - once a day. She was admitted to the hospital in march with COPD exacerbation and was treated with IV steroids and antibiotics. \par Patient reports that sometimes she wakes up at night feeling like she can't breath, has trouble lying flat on the bed, uses pillows and takes the duoneb nebs and the sob improves. She reports cough and wheezing occasionally in the morning. Denies snoring, but has daytime sleepiness d/t orthopnea. The orthopnea started about 8 months ago and has been gradually worsening. \par Smokes about 1 pack/week - has been using nicotine inhaler to cut down. \par She is scheduled for incisional hernia repair but needs pulmonary risk stratification. \par  [TextBox_11] : 0.25 [TextBox_13] : 30 [Awakes Unrefreshed] : does not awaken unrefreshed [Recent  Weight Gain] : no recent weight gain [Snoring] : no snoring [Witnessed Apneas] : no witnessed apneas

## 2022-10-28 NOTE — HISTORY OF PRESENT ILLNESS
[de-identified] : 53 yo female with a hx of hyperthyroidism, GERD, and COPD presenting for evaluation of a recurrent supraumbilical hernia. She had it repairs over 10 years ago but cannot remember the surgeon- she thinks he used mesh. She is a current smoker and has cut back her smoking significently, she used to smoke 2ppd but now only smokes a few cig per day. She also needs a hysterectomy and has seen Dr De La O for this. She is interested in having the hernia repaired at the time of gyn surgery.

## 2022-10-28 NOTE — REVIEW OF SYSTEMS
[Wheezing] : wheezing [Cough] : cough [Negative] : Heme/Lymph [Orthopnea] : orthopnea [Paroysmal Nocturnal Dyspnea] : paroysmal nocturnal dyspnea [Shortness Of Breath] : shortness of breath [Dyspnea on Exertion] : no dyspnea on exertion

## 2022-10-28 NOTE — ASSESSMENT
[FreeTextEntry1] : 53 yo female planning to undergo a hysterectomy with a recurrent reducible ventral hernia

## 2022-11-07 ENCOUNTER — EMERGENCY (EMERGENCY)
Facility: HOSPITAL | Age: 52
LOS: 0 days | Discharge: HOME | End: 2022-11-07
Attending: EMERGENCY MEDICINE | Admitting: EMERGENCY MEDICINE

## 2022-11-07 VITALS
WEIGHT: 125 LBS | DIASTOLIC BLOOD PRESSURE: 82 MMHG | HEART RATE: 82 BPM | OXYGEN SATURATION: 95 % | SYSTOLIC BLOOD PRESSURE: 123 MMHG | TEMPERATURE: 99 F | RESPIRATION RATE: 18 BRPM

## 2022-11-07 DIAGNOSIS — F17.200 NICOTINE DEPENDENCE, UNSPECIFIED, UNCOMPLICATED: ICD-10-CM

## 2022-11-07 DIAGNOSIS — R06.02 SHORTNESS OF BREATH: ICD-10-CM

## 2022-11-07 DIAGNOSIS — J44.1 CHRONIC OBSTRUCTIVE PULMONARY DISEASE WITH (ACUTE) EXACERBATION: ICD-10-CM

## 2022-11-07 DIAGNOSIS — K21.9 GASTRO-ESOPHAGEAL REFLUX DISEASE WITHOUT ESOPHAGITIS: ICD-10-CM

## 2022-11-07 DIAGNOSIS — R05.8 OTHER SPECIFIED COUGH: ICD-10-CM

## 2022-11-07 DIAGNOSIS — R07.89 OTHER CHEST PAIN: ICD-10-CM

## 2022-11-07 DIAGNOSIS — Z91.041 RADIOGRAPHIC DYE ALLERGY STATUS: ICD-10-CM

## 2022-11-07 DIAGNOSIS — Z86.2 PERSONAL HISTORY OF DISEASES OF THE BLOOD AND BLOOD-FORMING ORGANS AND CERTAIN DISORDERS INVOLVING THE IMMUNE MECHANISM: ICD-10-CM

## 2022-11-07 DIAGNOSIS — Z20.822 CONTACT WITH AND (SUSPECTED) EXPOSURE TO COVID-19: ICD-10-CM

## 2022-11-07 DIAGNOSIS — E03.9 HYPOTHYROIDISM, UNSPECIFIED: ICD-10-CM

## 2022-11-07 DIAGNOSIS — K46.9 UNSPECIFIED ABDOMINAL HERNIA WITHOUT OBSTRUCTION OR GANGRENE: Chronic | ICD-10-CM

## 2022-11-07 LAB
ALBUMIN SERPL ELPH-MCNC: 4.2 G/DL — SIGNIFICANT CHANGE UP (ref 3.5–5.2)
ALP SERPL-CCNC: 63 U/L — SIGNIFICANT CHANGE UP (ref 30–115)
ALT FLD-CCNC: 14 U/L — SIGNIFICANT CHANGE UP (ref 0–41)
ANION GAP SERPL CALC-SCNC: 11 MMOL/L — SIGNIFICANT CHANGE UP (ref 7–14)
AST SERPL-CCNC: 22 U/L — SIGNIFICANT CHANGE UP (ref 0–41)
BASE EXCESS BLDV CALC-SCNC: 4.5 MMOL/L — HIGH (ref -2–3)
BASOPHILS # BLD AUTO: 0.02 K/UL — SIGNIFICANT CHANGE UP (ref 0–0.2)
BASOPHILS NFR BLD AUTO: 0.5 % — SIGNIFICANT CHANGE UP (ref 0–1)
BILIRUB SERPL-MCNC: 0.3 MG/DL — SIGNIFICANT CHANGE UP (ref 0.2–1.2)
BUN SERPL-MCNC: 6 MG/DL — LOW (ref 10–20)
CA-I SERPL-SCNC: 1.25 MMOL/L — SIGNIFICANT CHANGE UP (ref 1.15–1.33)
CALCIUM SERPL-MCNC: 9.6 MG/DL — SIGNIFICANT CHANGE UP (ref 8.4–10.5)
CHLORIDE SERPL-SCNC: 105 MMOL/L — SIGNIFICANT CHANGE UP (ref 98–110)
CO2 SERPL-SCNC: 25 MMOL/L — SIGNIFICANT CHANGE UP (ref 17–32)
CREAT SERPL-MCNC: 0.7 MG/DL — SIGNIFICANT CHANGE UP (ref 0.7–1.5)
EGFR: 103 ML/MIN/1.73M2 — SIGNIFICANT CHANGE UP
EOSINOPHIL # BLD AUTO: 0.21 K/UL — SIGNIFICANT CHANGE UP (ref 0–0.7)
EOSINOPHIL NFR BLD AUTO: 5 % — SIGNIFICANT CHANGE UP (ref 0–8)
FLUAV AG NPH QL: SIGNIFICANT CHANGE UP
FLUBV AG NPH QL: SIGNIFICANT CHANGE UP
GAS PNL BLDV: 138 MMOL/L — SIGNIFICANT CHANGE UP (ref 136–145)
GAS PNL BLDV: SIGNIFICANT CHANGE UP
GAS PNL BLDV: SIGNIFICANT CHANGE UP
GLUCOSE SERPL-MCNC: 91 MG/DL — SIGNIFICANT CHANGE UP (ref 70–99)
HCO3 BLDV-SCNC: 31 MMOL/L — HIGH (ref 22–29)
HCT VFR BLD CALC: 37.3 % — SIGNIFICANT CHANGE UP (ref 37–47)
HCT VFR BLDA CALC: 39 % — SIGNIFICANT CHANGE UP (ref 39–51)
HGB BLD CALC-MCNC: 13.1 G/DL — SIGNIFICANT CHANGE UP (ref 12.6–17.4)
HGB BLD-MCNC: 12.7 G/DL — SIGNIFICANT CHANGE UP (ref 12–16)
IMM GRANULOCYTES NFR BLD AUTO: 0.2 % — SIGNIFICANT CHANGE UP (ref 0.1–0.3)
LACTATE BLDV-MCNC: 1 MMOL/L — SIGNIFICANT CHANGE UP (ref 0.5–2)
LYMPHOCYTES # BLD AUTO: 1.78 K/UL — SIGNIFICANT CHANGE UP (ref 1.2–3.4)
LYMPHOCYTES # BLD AUTO: 42.4 % — SIGNIFICANT CHANGE UP (ref 20.5–51.1)
MCHC RBC-ENTMCNC: 33.2 PG — HIGH (ref 27–31)
MCHC RBC-ENTMCNC: 34 G/DL — SIGNIFICANT CHANGE UP (ref 32–37)
MCV RBC AUTO: 97.4 FL — SIGNIFICANT CHANGE UP (ref 81–99)
MONOCYTES # BLD AUTO: 0.33 K/UL — SIGNIFICANT CHANGE UP (ref 0.1–0.6)
MONOCYTES NFR BLD AUTO: 7.9 % — SIGNIFICANT CHANGE UP (ref 1.7–9.3)
NEUTROPHILS # BLD AUTO: 1.85 K/UL — SIGNIFICANT CHANGE UP (ref 1.4–6.5)
NEUTROPHILS NFR BLD AUTO: 44 % — SIGNIFICANT CHANGE UP (ref 42.2–75.2)
NRBC # BLD: 0 /100 WBCS — SIGNIFICANT CHANGE UP (ref 0–0)
NT-PROBNP SERPL-SCNC: 8 PG/ML — SIGNIFICANT CHANGE UP (ref 0–300)
PCO2 BLDV: 52 MMHG — HIGH (ref 39–42)
PH BLDV: 7.38 — SIGNIFICANT CHANGE UP (ref 7.32–7.43)
PLATELET # BLD AUTO: 319 K/UL — SIGNIFICANT CHANGE UP (ref 130–400)
PO2 BLDV: 36 MMHG — SIGNIFICANT CHANGE UP
POTASSIUM BLDV-SCNC: 4.6 MMOL/L — SIGNIFICANT CHANGE UP (ref 3.5–5.1)
POTASSIUM SERPL-MCNC: 5 MMOL/L — SIGNIFICANT CHANGE UP (ref 3.5–5)
POTASSIUM SERPL-SCNC: 5 MMOL/L — SIGNIFICANT CHANGE UP (ref 3.5–5)
PROT SERPL-MCNC: 7.3 G/DL — SIGNIFICANT CHANGE UP (ref 6–8)
RBC # BLD: 3.83 M/UL — LOW (ref 4.2–5.4)
RBC # FLD: 11.9 % — SIGNIFICANT CHANGE UP (ref 11.5–14.5)
RSV RNA NPH QL NAA+NON-PROBE: SIGNIFICANT CHANGE UP
SAO2 % BLDV: 61.7 % — SIGNIFICANT CHANGE UP
SARS-COV-2 RNA SPEC QL NAA+PROBE: SIGNIFICANT CHANGE UP
SODIUM SERPL-SCNC: 141 MMOL/L — SIGNIFICANT CHANGE UP (ref 135–146)
TROPONIN T SERPL-MCNC: <0.01 NG/ML — SIGNIFICANT CHANGE UP
WBC # BLD: 4.2 K/UL — LOW (ref 4.8–10.8)
WBC # FLD AUTO: 4.2 K/UL — LOW (ref 4.8–10.8)

## 2022-11-07 PROCEDURE — 93010 ELECTROCARDIOGRAM REPORT: CPT

## 2022-11-07 PROCEDURE — 71045 X-RAY EXAM CHEST 1 VIEW: CPT | Mod: 26

## 2022-11-07 PROCEDURE — 99285 EMERGENCY DEPT VISIT HI MDM: CPT

## 2022-11-07 RX ORDER — IPRATROPIUM/ALBUTEROL SULFATE 18-103MCG
3 AEROSOL WITH ADAPTER (GRAM) INHALATION
Refills: 0 | Status: DISCONTINUED | OUTPATIENT
Start: 2022-11-07 | End: 2022-11-07

## 2022-11-07 RX ORDER — ALBUTEROL 90 UG/1
2.5 AEROSOL, METERED ORAL ONCE
Refills: 0 | Status: COMPLETED | OUTPATIENT
Start: 2022-11-07 | End: 2022-11-07

## 2022-11-07 RX ORDER — AZITHROMYCIN 500 MG/1
1 TABLET, FILM COATED ORAL
Qty: 4 | Refills: 0
Start: 2022-11-07 | End: 2022-11-10

## 2022-11-07 RX ORDER — AZITHROMYCIN 500 MG/1
500 TABLET, FILM COATED ORAL ONCE
Refills: 0 | Status: COMPLETED | OUTPATIENT
Start: 2022-11-07 | End: 2022-11-07

## 2022-11-07 RX ADMIN — Medication 3 MILLILITER(S): at 08:14

## 2022-11-07 RX ADMIN — ALBUTEROL 2.5 MILLIGRAM(S): 90 AEROSOL, METERED ORAL at 10:19

## 2022-11-07 RX ADMIN — Medication 125 MILLIGRAM(S): at 08:14

## 2022-11-07 RX ADMIN — AZITHROMYCIN 255 MILLIGRAM(S): 500 TABLET, FILM COATED ORAL at 10:18

## 2022-11-07 NOTE — ED PROVIDER NOTE - CLINICAL SUMMARY MEDICAL DECISION MAKING FREE TEXT BOX
52 y.o. female w/ PMH of COPD not on home O2, anemia, GERD, hypothyroidism presents for SOB x 3 days, worsened today. No sick contacts, associated productive cough and chest tightness, tried nebulizer treatments at home which somewhat helped sxs. No fevers, leg pain, leg swelling, hemoptysis, hx of blood clots. No previous hx of ICU admissions or intubations for COPD. Pt is a smoker. On exam, pt in NAD, AAOx3, head NC/AT, CN II-XII intact, PEERL, EOMi, neck (-) midline tenderness, lungs wheezing B/L, CV S1S2 regular, abdomen soft/NT/ND/(+)BS, ext (-) edema, motor 5/5x4, sensation intact, ambulating with steady gait. Pt treated with steroids and nebs with improvement of symptoms. Tolerating PO. Will d/c with pulm follow up. Advised to return for worsening of symptoms.

## 2022-11-07 NOTE — ED PROVIDER NOTE - OBJECTIVE STATEMENT
53 yo female w/ PMH of COPD not on home O2, anemia, GERD, hypothyroidism presents for SOB x 3 days, worsened today. No sick contacts, associated productive cough and chest tightness, tried nebulizer treatments at home which somewhat helped sxs. No fevers, leg pain, leg swelling, hemoptysis, hx of blood clots. No previous hx of ICU admissions or intubations for COPD.

## 2022-11-07 NOTE — ED PROVIDER NOTE - NSFOLLOWUPCLINICS_GEN_ALL_ED_FT
A Pulmonologist  Pulmonary Medicine  .  [place]   Phone:   Fax:   Established Patient  Follow Up Time: 1-3 Days

## 2022-11-07 NOTE — ED PROVIDER NOTE - PROGRESS NOTE DETAILS
Rudy: Pt stating sxs improved, on exam appears well, LCTAB. Pt stating she still feels a little SOB/chest tightness. Will give Azithro, another neb treatment, and reassess. Rudy: pt stating chest tightness resolved. On exam, LCTAB, will DC with RX. Pt has already made an appointment with a pulmonologist, will f/u with pulmonologist and PCP.

## 2022-11-07 NOTE — ED PROVIDER NOTE - NS ED ROS FT
Constitutional: No fevers, chills, or malaise.  HEENT: No headache, visual changes   Cardiac:  No leg edema, or leg pain.  Respiratory:  Reports cough and SOB.   GI:  No nausea, vomiting, diarrhea, or abdominal pain.  :  No dysuria, frequency, or urgency.  MS:  No myalgia, muscle weakness, joint pain or back pain.  Neuro:  No dizziness, LOC, paralysis, or N/T.  Skin:  No skin rash.   Endocrine: No polyuria, polyphagia, or polydipsia.

## 2022-11-07 NOTE — ED PROVIDER NOTE - PATIENT PORTAL LINK FT
You can access the FollowMyHealth Patient Portal offered by [facility] by registering at the following website: [email or web address]. By joining [email or web address]’s FollowMyHealth portal, you will also be able to view your health information using other applications (apps) compatible with our system.

## 2022-11-07 NOTE — ED PROVIDER NOTE - CARE PROVIDER_API CALL
Louis Dinh)  Internal Medicine  [address] [place] [address], [address] [place] [address]  Phone: [phone]  Fax: [phone]  Established Patient  Follow Up Time: 1-3 Days

## 2022-11-07 NOTE — ED ADULT TRIAGE NOTE - CHIEF COMPLAINT QUOTE
Patient with Hx COPD c/o worsening SOB and cough x 2 days. Patient denies home O2 use fevers and chills

## 2022-11-07 NOTE — ED PROVIDER NOTE - PHYSICAL EXAMINATION
GENERAL: NAD   SKIN: warm, dry  HEAD: Normocephalic; atraumatic.  EYES: PERRLA, EOMI   ENT: oropharynx WNL   CARD: S1, S2 normal; no murmurs, gallops, or rubs. Regular rate and rhythm.   RESP: Diffuse expiratory wheezing, no increased WOB noted   EXT: No LE TTP or edema bilaterally.  NEURO: Alert, oriented, grossly unremarkable  PSYCH: Cooperative, appropriate.

## 2022-11-07 NOTE — ED ADULT NURSE NOTE - NSIMPLEMENTINTERV_GEN_ALL_ED
Implemented All Fall with Harm Risk Interventions:  [place] to call system. Call bell, personal items and telephone within reach. Instruct patient to call for assistance. Room bathroom lighting operational. Non-slip footwear when patient is off stretcher. Physically safe environment: no spills, clutter or unnecessary equipment. Stretcher in lowest position, wheels locked, appropriate side rails in place. Provide visual cue, wrist band, yellow gown, etc. Monitor gait and stability. Monitor for mental status changes and reorient to person, place, and time. Review medications for side effects contributing to fall risk. Reinforce activity limits and safety measures with patient and family. Provide visual clues: red socks.

## 2022-11-14 ENCOUNTER — APPOINTMENT (OUTPATIENT)
Dept: OBGYN | Facility: CLINIC | Age: 52
End: 2022-11-14

## 2022-11-28 ENCOUNTER — APPOINTMENT (OUTPATIENT)
Dept: OBGYN | Facility: CLINIC | Age: 52
End: 2022-11-28

## 2022-12-07 NOTE — ED PROVIDER NOTE - EXITCARE/DISCHARGE INSTRUCTIONS
[FreeTextEntry1] : Mr. 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 Patient reports he has had this lesion for several years and does not have complaints of associated pain. He has no previous history of sarcoma.\par \par 09/27/2018: Patient is s/p radical resection of frontal scalp spindle cell neoplasm and biopsy of vertex scalp lesion. Skin graft coverage by Dr. Myles. Recovering well. Denies pain.\par Pathology: Incidental finding of poorly differentiated 1 cm squamous cell cancer extending to deep and left margin, but no residual spindle cell lesion seen. Additional deep margin shows spindle cell lesion felt to represent fibrosing granulation tissue. 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 Myles 12/17/2018. Pathology shows minimal residual disease, margins negative.\par \par Patient was seen by dermatology on 7/2020 and underwent biopsy of a brown pigmented scalp lesion posterior to skin graft. He continues to have a nonhealing ulcer at the vertex scalp at site of prior biopsy. He denies pain or drainage. 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 A significant portion of his pre-existing skin graft and adjacent scalp. An additional left parietal scalp lesion was excised as well.\par Pathology: scalp biopsy - small foci of squamous cell carcinoma in situ, epidermal cyst, no melanoma identified. Parietal scalp lesion - squamous cell carcinoma in situ - extending to one margin.\par Scalp lesion was covered with Integra and is granulating well. He is pending formal skin graft coverage.\par \par CT head 3/2/22: New abnormal soft tissue lesions along the vertex of the scalp with right parietal calvarial vertex erosive changes when compared to the prior CT and MRI studies. \par \par Patient is s/p excision of malignant vertex scalp mass with Integra coverage 04/04/2022.\par Pathology: at least 1.3 mm thick, ulcerated melanoma with positive deep margin and focally anterior left margin. Given deep margin there is gross disease. Patient also with continued bleeding. Skull resection not an option. Evidence of melanoma cell with bony fragments. \par \par 8/10/2022 Completed planned RT to scalp total dose of 6000 cGy. \par \par 11/1/2022 PET/CT: IMPRESSION:\par 1. Interval response to therapy in the scalp lesion, with minimal residual FDG uptake. No metastases identified.\par \par Presents today for follow up. \par \par 
Launch [employer] and print the 'Prescriptions from this Visit' Report

## 2022-12-21 ENCOUNTER — APPOINTMENT (OUTPATIENT)
Dept: INTERNAL MEDICINE | Facility: CLINIC | Age: 52
End: 2022-12-21

## 2022-12-21 ENCOUNTER — OUTPATIENT (OUTPATIENT)
Dept: OUTPATIENT SERVICES | Facility: HOSPITAL | Age: 52
LOS: 1 days | Discharge: HOME | End: 2022-12-21

## 2022-12-21 VITALS
HEART RATE: 78 BPM | HEIGHT: 65 IN | SYSTOLIC BLOOD PRESSURE: 149 MMHG | BODY MASS INDEX: 20.83 KG/M2 | TEMPERATURE: 97.9 F | OXYGEN SATURATION: 99 % | WEIGHT: 125 LBS | DIASTOLIC BLOOD PRESSURE: 82 MMHG

## 2022-12-21 DIAGNOSIS — F41.1 GENERALIZED ANXIETY DISORDER: ICD-10-CM

## 2022-12-21 DIAGNOSIS — K46.9 UNSPECIFIED ABDOMINAL HERNIA WITHOUT OBSTRUCTION OR GANGRENE: Chronic | ICD-10-CM

## 2022-12-21 DIAGNOSIS — F32.A DEPRESSION, UNSPECIFIED: ICD-10-CM

## 2022-12-21 DIAGNOSIS — F17.200 NICOTINE DEPENDENCE, UNSPECIFIED, UNCOMPLICATED: ICD-10-CM

## 2022-12-21 PROCEDURE — 99214 OFFICE O/P EST MOD 30 MIN: CPT | Mod: GC

## 2022-12-21 RX ORDER — AMLODIPINE BESYLATE 5 MG/1
5 TABLET ORAL DAILY
Qty: 90 | Refills: 3 | Status: ACTIVE | COMMUNITY
Start: 2022-12-21

## 2022-12-21 NOTE — ASSESSMENT
[FreeTextEntry1] : Mrs. Pedraza 52 yr old female with PMHx of copd, gerd, hyperthyroidism, microcytic anemia, cervical polyp s/p resection, umbilical hernia s/p surgery in 2010 here to for a follow up visit. \par \par #COPD\par - c/w with inhalers\par - medications refilled\par - no hx of exacerbations\par - pulm f/u \par \par #hyperthyroid on methimazole\par - TSH 0.45 (5/2022)\par - f/u repeat TSH and free t4 levels\par \par #GERD\par - c/w ppi\par \par #Depressed mood \par  sydal \par pt is unemployed, smokes marijuana for comfort \par \par #Vit D deficiency\par - level 24 (5/2022)\par - advised on OTC supplements\par \par #hypertension\par - on amlodipine 5mg daily \par \par # Microcytic anemia\par #possible adenomyosis \par # CRC screening\par - hgb 7.4 in July 2021\par - Pt had heavy and prolonged menses : on iron therapy; pending heme/onc follow up\par - has to follow up with ogyn, pt missed last two appts \par - never had colonoscopy before; however pulm clearance needed prior to procedure\par \par #umbical hernia \par - f/u surgery \par \par # GERD\par - Symptoms controlled \par - cw PPI\par - counseled on dietary and lifestyle modification\par \par #HCM\par - Mammogram referral given \par - pt received flu shot from a local pharm\par - rto in 6months and prn

## 2022-12-21 NOTE — PHYSICAL EXAM
[No Acute Distress] : no acute distress [Well Nourished] : well nourished [Normal Outer Ear/Nose] : the outer ears and nose were normal in appearance [No JVD] : no jugular venous distention [Clear to Auscultation] : lungs were clear to auscultation bilaterally [Normal] : no joint swelling and grossly normal strength and tone [No Edema] : there was no peripheral edema [Flat] : flat [Soft, Nontender] : the abdomen was soft and nontender [Abdomen Hernia Ventral] : a ventral hernia was present [Mid-line] : in the mid-line [de-identified] : depressed; low mood

## 2022-12-21 NOTE — HISTORY OF PRESENT ILLNESS
[FreeTextEntry1] : routine f/u [de-identified] : Mrs. Pedraza 52 yr old female with PMHx of copd, gerd, hyperthyroidism here to for a follow up visit. \par Pt states she is having some sob off and on due to th weather. Has decreased appetite, asking for refills and ensure. Otherwise feels well.

## 2022-12-22 DIAGNOSIS — F32.A DEPRESSION, UNSPECIFIED: ICD-10-CM

## 2022-12-22 DIAGNOSIS — F17.200 NICOTINE DEPENDENCE, UNSPECIFIED, UNCOMPLICATED: ICD-10-CM

## 2022-12-22 DIAGNOSIS — D50.9 IRON DEFICIENCY ANEMIA, UNSPECIFIED: ICD-10-CM

## 2022-12-22 DIAGNOSIS — F41.1 GENERALIZED ANXIETY DISORDER: ICD-10-CM

## 2022-12-22 DIAGNOSIS — J44.9 CHRONIC OBSTRUCTIVE PULMONARY DISEASE, UNSPECIFIED: ICD-10-CM

## 2023-01-17 ENCOUNTER — OUTPATIENT (OUTPATIENT)
Dept: OUTPATIENT SERVICES | Facility: HOSPITAL | Age: 53
LOS: 1 days | Discharge: HOME | End: 2023-01-17

## 2023-01-17 ENCOUNTER — NON-APPOINTMENT (OUTPATIENT)
Age: 53
End: 2023-01-17

## 2023-01-17 ENCOUNTER — APPOINTMENT (OUTPATIENT)
Dept: PULMONOLOGY | Facility: CLINIC | Age: 53
End: 2023-01-17
Payer: MEDICAID

## 2023-01-17 VITALS
HEIGHT: 65 IN | TEMPERATURE: 97.5 F | BODY MASS INDEX: 21.33 KG/M2 | HEART RATE: 93 BPM | SYSTOLIC BLOOD PRESSURE: 109 MMHG | WEIGHT: 128 LBS | OXYGEN SATURATION: 96 % | DIASTOLIC BLOOD PRESSURE: 73 MMHG

## 2023-01-17 DIAGNOSIS — K46.9 UNSPECIFIED ABDOMINAL HERNIA WITHOUT OBSTRUCTION OR GANGRENE: Chronic | ICD-10-CM

## 2023-01-17 PROCEDURE — 99214 OFFICE O/P EST MOD 30 MIN: CPT | Mod: 25,GC

## 2023-01-17 RX ORDER — TIOTROPIUM BROMIDE INHALATION SPRAY 3.12 UG/1
2.5 SPRAY, METERED RESPIRATORY (INHALATION) DAILY
Qty: 2 | Refills: 3 | Status: DISCONTINUED | COMMUNITY
Start: 2022-09-30 | End: 2023-01-17

## 2023-01-17 RX ORDER — FLUTICASONE PROPIONATE AND SALMETEROL 250; 50 UG/1; UG/1
250-50 POWDER RESPIRATORY (INHALATION)
Qty: 1 | Refills: 6 | Status: DISCONTINUED | COMMUNITY
Start: 2020-05-12 | End: 2023-01-17

## 2023-01-17 NOTE — HISTORY OF PRESENT ILLNESS
[TextBox_4] : 50 yo F with PMH of hyperthyroidism, COPD/asthma and Post nasal drip presented to the office for follow up. \par \par She said she is feeling improved compared to before. She is still wheezing and coughing. but no exertional shortness of breath. mainly night time shortness of breath requiring use of DuoNeb to relieve her symptoms. She is still complaining from orthopnea. She cut down smoking to 3 cigarettes a day but started smoking marijuana. \par \par Was supposed to get PFT, sleep study and cardiology evaluation, not done yet. \par

## 2023-01-17 NOTE — COUNSELING
[Cessation strategies including cessation program discussed] : Cessation strategies including cessation program discussed [Use of nicotine replacement therapies and other medications discussed] : Use of nicotine replacement therapies and other medications discussed [Encouraged to pick a quit date and identify support needed to quit] : Encouraged to pick a quit date and identify support needed to quit [Yes] : Willing to quit smoking [FreeTextEntry1] : 6

## 2023-01-17 NOTE — END OF VISIT
[] : Resident [FreeTextEntry3] : Ms Pedraza is a pleasant woman seen & examined today, she feels overall well but is still using her rescue inhaler at least 1-2x/day.  We will consolidate her inhalers into Trelegy, hopefully for improved compliance.  We will also reorder her PSG and PFT that were not done, as the patient is high risk for BARON given her STOP-BANG score.  Tobacco cessation counseling was also performed as documented above.

## 2023-01-17 NOTE — ASSESSMENT
[FreeTextEntry1] : 51 y.o female patient with PMH of hyperthyroidism, COPD/asthma and Post nasal drip presents to the office follow up and preop risk stratification. \par \par # COPD - controlled\par - one exacerbation in last year requiring admission. no recent admission or ED visit\par - currently on spiriva, advair and singulair. Duoneb and albuterol as needed \par - smoking cessation advised \par - pending PFT \par - cardio referral for evaluation for possible cardiac cause for orthopnea/PND\par - start Trelegy Ellipta. DC Advair and spiriva \par - continue Duoneb, albuterol and Singulair \par - reordered PFT \par \par # suspected sleep apnea\par - Doesn't get good sleep at night and wakes up multiple times feeling short of breath\par - STOP BANG is 3, high risk \par \par # Post nasal drip - c/w resume Flonase as needed\par \par # Lung cancer screening \par - order CT scan next visit \par \par \par # RTC In 6 months

## 2023-01-17 NOTE — REVIEW OF SYSTEMS
[Cough] : cough [Sputum] : sputum [Wheezing] : wheezing [Orthopnea] : orthopnea [Fever] : no fever [Fatigue] : no fatigue [Dyspnea] : no dyspnea [Chest Discomfort] : no chest discomfort [Palpitations] : no palpitations [Headache] : no headache [Dizziness] : no dizziness

## 2023-01-17 NOTE — PHYSICAL EXAM
[No Acute Distress] : no acute distress [Normal Oropharynx] : normal oropharynx [Normal Appearance] : normal appearance [Normal Rate/Rhythm] : normal rate/rhythm [Normal S1, S2] : normal s1, s2 [No Resp Distress] : no resp distress [Clear to Auscultation Bilaterally] : clear to auscultation bilaterally [Not Tender] : not tender [Soft] : soft [No Clubbing] : no clubbing [No Edema] : no edema [No Focal Deficits] : no focal deficits [Oriented x3] : oriented x3 [Normal Affect] : normal affect

## 2023-01-26 ENCOUNTER — NON-APPOINTMENT (OUTPATIENT)
Age: 53
End: 2023-01-26

## 2023-01-27 ENCOUNTER — EMERGENCY (EMERGENCY)
Facility: HOSPITAL | Age: 53
LOS: 0 days | Discharge: HOME | End: 2023-01-27
Attending: EMERGENCY MEDICINE | Admitting: EMERGENCY MEDICINE
Payer: MEDICAID

## 2023-01-27 VITALS
HEART RATE: 84 BPM | DIASTOLIC BLOOD PRESSURE: 66 MMHG | RESPIRATION RATE: 20 BRPM | TEMPERATURE: 98 F | SYSTOLIC BLOOD PRESSURE: 116 MMHG | OXYGEN SATURATION: 100 %

## 2023-01-27 VITALS
DIASTOLIC BLOOD PRESSURE: 75 MMHG | SYSTOLIC BLOOD PRESSURE: 132 MMHG | RESPIRATION RATE: 20 BRPM | HEART RATE: 84 BPM | OXYGEN SATURATION: 100 % | TEMPERATURE: 97 F

## 2023-01-27 DIAGNOSIS — E03.9 HYPOTHYROIDISM, UNSPECIFIED: ICD-10-CM

## 2023-01-27 DIAGNOSIS — J44.1 CHRONIC OBSTRUCTIVE PULMONARY DISEASE WITH (ACUTE) EXACERBATION: ICD-10-CM

## 2023-01-27 DIAGNOSIS — R06.2 WHEEZING: ICD-10-CM

## 2023-01-27 DIAGNOSIS — Z91.041 RADIOGRAPHIC DYE ALLERGY STATUS: ICD-10-CM

## 2023-01-27 DIAGNOSIS — Z87.891 PERSONAL HISTORY OF NICOTINE DEPENDENCE: ICD-10-CM

## 2023-01-27 DIAGNOSIS — K21.9 GASTRO-ESOPHAGEAL REFLUX DISEASE WITHOUT ESOPHAGITIS: ICD-10-CM

## 2023-01-27 DIAGNOSIS — T48.6X6A UNDERDOSING OF ANTIASTHMATICS, INITIAL ENCOUNTER: ICD-10-CM

## 2023-01-27 DIAGNOSIS — Z86.2 PERSONAL HISTORY OF DISEASES OF THE BLOOD AND BLOOD-FORMING ORGANS AND CERTAIN DISORDERS INVOLVING THE IMMUNE MECHANISM: ICD-10-CM

## 2023-01-27 DIAGNOSIS — Z91.138 PATIENT'S UNINTENTIONAL UNDERDOSING OF MEDICATION REGIMEN FOR OTHER REASON: ICD-10-CM
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
## 2023-01-27 DIAGNOSIS — K46.9 UNSPECIFIED ABDOMINAL HERNIA WITHOUT OBSTRUCTION OR GANGRENE: Chronic | ICD-10-CM

## 2023-01-27 DIAGNOSIS — R06.02 SHORTNESS OF BREATH: ICD-10-CM
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
## 2023-01-27 LAB
ANION GAP SERPL CALC-SCNC: 11 MMOL/L — SIGNIFICANT CHANGE UP (ref 7–14)
BASOPHILS # BLD AUTO: 0.04 K/UL — SIGNIFICANT CHANGE UP (ref 0–0.2)
BASOPHILS NFR BLD AUTO: 0.8 % — SIGNIFICANT CHANGE UP (ref 0–1)
BUN SERPL-MCNC: 8 MG/DL — LOW (ref 10–20)
CALCIUM SERPL-MCNC: 9.5 MG/DL — SIGNIFICANT CHANGE UP (ref 8.4–10.5)
CHLORIDE SERPL-SCNC: 105 MMOL/L — SIGNIFICANT CHANGE UP (ref 98–110)
CO2 SERPL-SCNC: 23 MMOL/L — SIGNIFICANT CHANGE UP (ref 17–32)
CREAT SERPL-MCNC: 0.7 MG/DL — SIGNIFICANT CHANGE UP (ref 0.7–1.5)
EGFR: 103 ML/MIN/1.73M2 — SIGNIFICANT CHANGE UP
EOSINOPHIL # BLD AUTO: 0.33 K/UL — SIGNIFICANT CHANGE UP (ref 0–0.7)
EOSINOPHIL NFR BLD AUTO: 6.6 % — SIGNIFICANT CHANGE UP (ref 0–8)
GLUCOSE SERPL-MCNC: 89 MG/DL — SIGNIFICANT CHANGE UP (ref 70–99)
HCT VFR BLD CALC: 42.1 % — SIGNIFICANT CHANGE UP (ref 37–47)
HGB BLD-MCNC: 14.5 G/DL — SIGNIFICANT CHANGE UP (ref 12–16)
IMM GRANULOCYTES NFR BLD AUTO: 0.2 % — SIGNIFICANT CHANGE UP (ref 0.1–0.3)
LYMPHOCYTES # BLD AUTO: 2.09 K/UL — SIGNIFICANT CHANGE UP (ref 1.2–3.4)
LYMPHOCYTES # BLD AUTO: 41.9 % — SIGNIFICANT CHANGE UP (ref 20.5–51.1)
MAGNESIUM SERPL-MCNC: 1.9 MG/DL — SIGNIFICANT CHANGE UP (ref 1.8–2.4)
MCHC RBC-ENTMCNC: 33.4 PG — HIGH (ref 27–31)
MCHC RBC-ENTMCNC: 34.4 G/DL — SIGNIFICANT CHANGE UP (ref 32–37)
MCV RBC AUTO: 97 FL — SIGNIFICANT CHANGE UP (ref 81–99)
MONOCYTES # BLD AUTO: 0.36 K/UL — SIGNIFICANT CHANGE UP (ref 0.1–0.6)
MONOCYTES NFR BLD AUTO: 7.2 % — SIGNIFICANT CHANGE UP (ref 1.7–9.3)
NEUTROPHILS # BLD AUTO: 2.16 K/UL — SIGNIFICANT CHANGE UP (ref 1.4–6.5)
NEUTROPHILS NFR BLD AUTO: 43.3 % — SIGNIFICANT CHANGE UP (ref 42.2–75.2)
NRBC # BLD: 0 /100 WBCS — SIGNIFICANT CHANGE UP (ref 0–0)
PLATELET # BLD AUTO: 302 K/UL — SIGNIFICANT CHANGE UP (ref 130–400)
POTASSIUM SERPL-MCNC: 4.8 MMOL/L — SIGNIFICANT CHANGE UP (ref 3.5–5)
POTASSIUM SERPL-SCNC: 4.8 MMOL/L — SIGNIFICANT CHANGE UP (ref 3.5–5)
RBC # BLD: 4.34 M/UL — SIGNIFICANT CHANGE UP (ref 4.2–5.4)
RBC # FLD: 12.3 % — SIGNIFICANT CHANGE UP (ref 11.5–14.5)
SODIUM SERPL-SCNC: 139 MMOL/L — SIGNIFICANT CHANGE UP (ref 135–146)
WBC # BLD: 4.99 K/UL — SIGNIFICANT CHANGE UP (ref 4.8–10.8)
WBC # FLD AUTO: 4.99 K/UL — SIGNIFICANT CHANGE UP (ref 4.8–10.8)

## 2023-01-27 PROCEDURE — 99284 EMERGENCY DEPT VISIT MOD MDM: CPT

## 2023-01-27 PROCEDURE — 71045 X-RAY EXAM CHEST 1 VIEW: CPT | Mod: 26

## 2023-01-27 RX ORDER — TIOTROPIUM BROMIDE 18 UG/1
2 CAPSULE ORAL; RESPIRATORY (INHALATION)
Qty: 1 | Refills: 0
Start: 2023-01-27 | End: 2023-02-25

## 2023-01-27 RX ORDER — IPRATROPIUM/ALBUTEROL SULFATE 18-103MCG
3 AEROSOL WITH ADAPTER (GRAM) INHALATION ONCE
Refills: 0 | Status: COMPLETED | OUTPATIENT
Start: 2023-01-27 | End: 2023-01-27

## 2023-01-27 RX ADMIN — Medication 125 MILLIGRAM(S): at 13:53

## 2023-01-27 RX ADMIN — Medication 3 MILLILITER(S): at 13:55

## 2023-01-27 RX ADMIN — Medication 3 MILLILITER(S): at 13:56

## 2023-01-27 RX ADMIN — Medication 3 MILLILITER(S): at 15:30

## 2023-01-27 NOTE — ED PROVIDER NOTE - PATIENT PORTAL LINK FT
You can access the FollowMyHealth Patient Portal offered by [facility] by registering at the following website: [email or web address]. By joining [employer]’s FollowMyHealth portal, you will also be able to view your health information using other applications (apps) compatible with our system.

## 2023-01-27 NOTE — ED PROVIDER NOTE - PHYSICAL EXAMINATION
VITAL SIGNS: I have reviewed nursing notes and confirm.  CONSTITUTIONAL: Well-developed; well-nourished; in no acute distress.  SKIN: Skin exam is warm and dry, no acute rash.  HEAD: Normocephalic; atraumatic.  EYES: PERRL, EOM intact; conjunctiva and sclera clear.  ENT: No nasal discharge; airway clear. TMs clear.  NECK: Supple; non tender.  CARD: S1, S2 normal; no murmurs, gallops, or rubs. Regular rate and rhythm.  RESP: Diffuse wheezing, mild  ABD: Normal bowel sounds; soft; non-distended; non-tender;  EXT: Normal ROM. No clubbing, cyanosis or edema.  PSYCH: Cooperative, appropriate.

## 2023-01-27 NOTE — ED PROVIDER NOTE - OBJECTIVE STATEMENT
52-year-old female past medical history of COPD, usually on Singulair and Advair, anemia, GERD, hypothyroidism, presents with complaint of shortness of breath.  Patient also admits wheezing.  Patient says her COPD is acting up.  Patient has been without her Singulair or Advair for the last few days, because her preauthorization had not been approved yet for medication.  Patient denies any chest pain.  Patient denies any fever or chills.  Has been taking nebulizers at home with transient relief.  Last nebulizer was by EMS on the way to the hospital.

## 2023-01-27 NOTE — ED PROVIDER NOTE - CLINICAL SUMMARY MEDICAL DECISION MAKING FREE TEXT BOX
Pt with copd exacerbation after running out of her maintenance medications (advair and spiriva) and not yet being authorized for her new medication trelegy, wheezing and tight initially and improved significantly with ED treatments, labs reassuring.  Labs and EKG were ordered and reviewed.  Imaging was ordered and reviewed by me.  Appropriate medications for patient's presenting complaints were ordered and effects were reassessed.  Patient's records (prior hospital, ED visit, and/or nursing home notes if available) were reviewed.  Additional history was obtained from EMS, family, and/or PCP (where available).  Escalation to admission/observation was considered.  1) However patient feels much better and is comfortable with discharge.  Appropriate follow-up was arranged. Pt was given strict return to ED precautions and pt indicated that he/she understood.

## 2023-01-27 NOTE — ED ADULT NURSE NOTE - OBJECTIVE STATEMENT
52 year old female complaining of chest tightness and sob since Tuesday. Pt states she has COPD, not on home O2. Pt states "her pulmonologist recently changed her medication and is waiting for authorization for it." Pt denies fever, abdominal pain, nausea, vomiting, diarrhea. Pt complains of generalized pain "due to coughing."

## 2023-02-10 ENCOUNTER — NON-APPOINTMENT (OUTPATIENT)
Age: 53
End: 2023-02-10

## 2023-02-13 ENCOUNTER — NON-APPOINTMENT (OUTPATIENT)
Age: 53
End: 2023-02-13

## 2023-02-20 NOTE — ED ADULT NURSE NOTE - CAS EDP DISCH TYPE
· Troponin is trending up  · No significant EKG change  · Patient remained asymptomatic  · Secondary to infection most likely  · Troponin improved from 2 to 4 hours  · Continue to monitor  · Echo: Estimated ejection fraction 60 to 65%  No regional wall motion abnormality identified  Increased end-diastolic pressures estimated with mild diastolic relaxation abnormality    · Troponin continued to trend down Home

## 2023-03-06 ENCOUNTER — APPOINTMENT (OUTPATIENT)
Dept: SLEEP CENTER | Facility: HOSPITAL | Age: 53
End: 2023-03-06

## 2023-04-04 ENCOUNTER — APPOINTMENT (OUTPATIENT)
Dept: CARDIOLOGY | Facility: CLINIC | Age: 53
End: 2023-04-04

## 2023-04-04 ENCOUNTER — APPOINTMENT (OUTPATIENT)
Dept: CARDIOLOGY | Facility: CLINIC | Age: 53
End: 2023-04-04
Payer: MEDICAID

## 2023-04-04 ENCOUNTER — OUTPATIENT (OUTPATIENT)
Dept: OUTPATIENT SERVICES | Facility: HOSPITAL | Age: 53
LOS: 1 days | End: 2023-04-04
Payer: MEDICAID

## 2023-04-04 ENCOUNTER — NON-APPOINTMENT (OUTPATIENT)
Age: 53
End: 2023-04-04

## 2023-04-04 VITALS
HEIGHT: 65 IN | BODY MASS INDEX: 20.16 KG/M2 | OXYGEN SATURATION: 98 % | TEMPERATURE: 97.7 F | WEIGHT: 121 LBS | SYSTOLIC BLOOD PRESSURE: 157 MMHG | HEART RATE: 84 BPM | DIASTOLIC BLOOD PRESSURE: 100 MMHG

## 2023-04-04 DIAGNOSIS — Z00.00 ENCOUNTER FOR GENERAL ADULT MEDICAL EXAMINATION WITHOUT ABNORMAL FINDINGS: ICD-10-CM

## 2023-04-04 DIAGNOSIS — K46.9 UNSPECIFIED ABDOMINAL HERNIA WITHOUT OBSTRUCTION OR GANGRENE: Chronic | ICD-10-CM

## 2023-04-04 DIAGNOSIS — R94.31 ABNORMAL ELECTROCARDIOGRAM [ECG] [EKG]: ICD-10-CM

## 2023-04-04 DIAGNOSIS — R06.00 DYSPNEA, UNSPECIFIED: ICD-10-CM

## 2023-04-04 PROCEDURE — 93010 ELECTROCARDIOGRAM REPORT: CPT

## 2023-04-04 PROCEDURE — 99203 OFFICE O/P NEW LOW 30 MIN: CPT

## 2023-04-04 PROCEDURE — 93005 ELECTROCARDIOGRAM TRACING: CPT

## 2023-04-04 PROCEDURE — 99203 OFFICE O/P NEW LOW 30 MIN: CPT | Mod: 25

## 2023-04-04 NOTE — PHYSICAL EXAM
[Well Developed] : well developed [Well Nourished] : well nourished [No Acute Distress] : no acute distress [Normal S1, S2] : normal S1, S2 [No Murmur] : no murmur [No Respiratory Distress] : no respiratory distress  [Soft] : abdomen soft [Non Tender] : non-tender [No Edema] : no edema [No Cyanosis] : no cyanosis [No Clubbing] : no clubbing [No Rash] : no rash [Moves all extremities] : moves all extremities [Alert and Oriented] : alert and oriented [de-identified] : decreased breath sounds bilaterally

## 2023-04-04 NOTE — HISTORY OF PRESENT ILLNESS
[FreeTextEntry1] : Pt is a 52 yo F, with PMHx of hypethyroidism, COPD/asthma, HTN, postnasal drip is referred into cardiology clinic from pulm. As per pulm notes, cardiology evaluation for any cardiac causes for orthopnea/PND. \par \par Pt is an active smoker of 20+ years (now smokes 2-3 cigarettes/day) and has a FMHx of MI (her father). She denies having any chest pain, palpitations, LE edema but does report SOB, orthopnea and PND. Says about 1 year ago, she started using 3 pillows instead of 2. She is unsure if she snores or has apneic episodes at night. She has baseline SOB but is able to walk 20 minutes without stopping. \par \par DATA:\par ECG 4/4: NSR, LVH\par TTE 2015: EF 55-60%, LVH\par exercise stress test 2014: negative\par \par a1c 4.3% 5/2022\par lipid panel 5/2022: chol 156, , , LDL 36\par \par

## 2023-04-04 NOTE — REVIEW OF SYSTEMS
[SOB] : shortness of breath [Dyspnea on exertion] : dyspnea during exertion [Orthopnea] : orthopnea [PND] : PND [Cough] : cough [Negative] : Heme/Lymph [Chest Discomfort] : no chest discomfort [Lower Ext Edema] : no extremity edema [Leg Claudication] : no intermittent leg claudication [Palpitations] : no palpitations [Syncope] : no syncope [Wheezing] : no wheezing

## 2023-04-07 DIAGNOSIS — R94.31 ABNORMAL ELECTROCARDIOGRAM [ECG] [EKG]: ICD-10-CM

## 2023-04-07 DIAGNOSIS — R06.00 DYSPNEA, UNSPECIFIED: ICD-10-CM

## 2023-04-07 DIAGNOSIS — I10 ESSENTIAL (PRIMARY) HYPERTENSION: ICD-10-CM

## 2023-04-07 DIAGNOSIS — G47.30 SLEEP APNEA, UNSPECIFIED: ICD-10-CM

## 2023-04-12 ENCOUNTER — LABORATORY RESULT (OUTPATIENT)
Age: 53
End: 2023-04-12

## 2023-04-23 ENCOUNTER — EMERGENCY (EMERGENCY)
Facility: HOSPITAL | Age: 53
LOS: 0 days | Discharge: HOME | End: 2023-04-23
Attending: EMERGENCY MEDICINE
Payer: MEDICAID

## 2023-04-23 VITALS
OXYGEN SATURATION: 98 % | SYSTOLIC BLOOD PRESSURE: 125 MMHG | TEMPERATURE: 99 F | RESPIRATION RATE: 17 BRPM | HEART RATE: 111 BPM | WEIGHT: 123.9 LBS | DIASTOLIC BLOOD PRESSURE: 73 MMHG

## 2023-04-23 VITALS — HEART RATE: 94 BPM

## 2023-04-23 DIAGNOSIS — Z86.2 PERSONAL HISTORY OF DISEASES OF THE BLOOD AND BLOOD-FORMING ORGANS AND CERTAIN DISORDERS INVOLVING THE IMMUNE MECHANISM: ICD-10-CM

## 2023-04-23 DIAGNOSIS — J44.1 CHRONIC OBSTRUCTIVE PULMONARY DISEASE WITH (ACUTE) EXACERBATION: ICD-10-CM

## 2023-04-23 DIAGNOSIS — E03.9 HYPOTHYROIDISM, UNSPECIFIED: ICD-10-CM

## 2023-04-23 DIAGNOSIS — Z91.041 RADIOGRAPHIC DYE ALLERGY STATUS: ICD-10-CM

## 2023-04-23 DIAGNOSIS — Z98.890 OTHER SPECIFIED POSTPROCEDURAL STATES: ICD-10-CM

## 2023-04-23 DIAGNOSIS — F17.200 NICOTINE DEPENDENCE, UNSPECIFIED, UNCOMPLICATED: ICD-10-CM

## 2023-04-23 DIAGNOSIS — R53.1 WEAKNESS: ICD-10-CM

## 2023-04-23 DIAGNOSIS — Z20.822 CONTACT WITH AND (SUSPECTED) EXPOSURE TO COVID-19: ICD-10-CM

## 2023-04-23 DIAGNOSIS — K21.9 GASTRO-ESOPHAGEAL REFLUX DISEASE WITHOUT ESOPHAGITIS: ICD-10-CM

## 2023-04-23 DIAGNOSIS — R06.02 SHORTNESS OF BREATH: ICD-10-CM

## 2023-04-23 DIAGNOSIS — K46.9 UNSPECIFIED ABDOMINAL HERNIA WITHOUT OBSTRUCTION OR GANGRENE: Chronic | ICD-10-CM

## 2023-04-23 LAB
ALBUMIN SERPL ELPH-MCNC: 4.6 G/DL — SIGNIFICANT CHANGE UP (ref 3.5–5.2)
ALP SERPL-CCNC: 68 U/L — SIGNIFICANT CHANGE UP (ref 30–115)
ALT FLD-CCNC: 16 U/L — SIGNIFICANT CHANGE UP (ref 0–41)
ANION GAP SERPL CALC-SCNC: 13 MMOL/L — SIGNIFICANT CHANGE UP (ref 7–14)
AST SERPL-CCNC: 20 U/L — SIGNIFICANT CHANGE UP (ref 0–41)
BASOPHILS # BLD AUTO: 0.03 K/UL — SIGNIFICANT CHANGE UP (ref 0–0.2)
BASOPHILS NFR BLD AUTO: 0.5 % — SIGNIFICANT CHANGE UP (ref 0–1)
BILIRUB SERPL-MCNC: 0.4 MG/DL — SIGNIFICANT CHANGE UP (ref 0.2–1.2)
BUN SERPL-MCNC: 14 MG/DL — SIGNIFICANT CHANGE UP (ref 10–20)
CALCIUM SERPL-MCNC: 9.8 MG/DL — SIGNIFICANT CHANGE UP (ref 8.4–10.5)
CHLORIDE SERPL-SCNC: 105 MMOL/L — SIGNIFICANT CHANGE UP (ref 98–110)
CO2 SERPL-SCNC: 25 MMOL/L — SIGNIFICANT CHANGE UP (ref 17–32)
CREAT SERPL-MCNC: 0.8 MG/DL — SIGNIFICANT CHANGE UP (ref 0.7–1.5)
EGFR: 88 ML/MIN/1.73M2 — SIGNIFICANT CHANGE UP
EOSINOPHIL # BLD AUTO: 0.26 K/UL — SIGNIFICANT CHANGE UP (ref 0–0.7)
EOSINOPHIL NFR BLD AUTO: 4.3 % — SIGNIFICANT CHANGE UP (ref 0–8)
FLUAV AG NPH QL: SIGNIFICANT CHANGE UP
FLUBV AG NPH QL: SIGNIFICANT CHANGE UP
GLUCOSE SERPL-MCNC: 91 MG/DL — SIGNIFICANT CHANGE UP (ref 70–99)
HCT VFR BLD CALC: 41 % — SIGNIFICANT CHANGE UP (ref 37–47)
HGB BLD-MCNC: 14.2 G/DL — SIGNIFICANT CHANGE UP (ref 12–16)
IMM GRANULOCYTES NFR BLD AUTO: 0.2 % — SIGNIFICANT CHANGE UP (ref 0.1–0.3)
LYMPHOCYTES # BLD AUTO: 2.29 K/UL — SIGNIFICANT CHANGE UP (ref 1.2–3.4)
LYMPHOCYTES # BLD AUTO: 37.9 % — SIGNIFICANT CHANGE UP (ref 20.5–51.1)
MAGNESIUM SERPL-MCNC: 2 MG/DL — SIGNIFICANT CHANGE UP (ref 1.8–2.4)
MCHC RBC-ENTMCNC: 33.4 PG — HIGH (ref 27–31)
MCHC RBC-ENTMCNC: 34.6 G/DL — SIGNIFICANT CHANGE UP (ref 32–37)
MCV RBC AUTO: 96.5 FL — SIGNIFICANT CHANGE UP (ref 81–99)
MONOCYTES # BLD AUTO: 0.46 K/UL — SIGNIFICANT CHANGE UP (ref 0.1–0.6)
MONOCYTES NFR BLD AUTO: 7.6 % — SIGNIFICANT CHANGE UP (ref 1.7–9.3)
NEUTROPHILS # BLD AUTO: 2.99 K/UL — SIGNIFICANT CHANGE UP (ref 1.4–6.5)
NEUTROPHILS NFR BLD AUTO: 49.5 % — SIGNIFICANT CHANGE UP (ref 42.2–75.2)
NRBC # BLD: 0 /100 WBCS — SIGNIFICANT CHANGE UP (ref 0–0)
NT-PROBNP SERPL-SCNC: 35 PG/ML — SIGNIFICANT CHANGE UP (ref 0–300)
PLATELET # BLD AUTO: 270 K/UL — SIGNIFICANT CHANGE UP (ref 130–400)
PMV BLD: 9.7 FL — SIGNIFICANT CHANGE UP (ref 7.4–10.4)
POTASSIUM SERPL-MCNC: 4.6 MMOL/L — SIGNIFICANT CHANGE UP (ref 3.5–5)
POTASSIUM SERPL-SCNC: 4.6 MMOL/L — SIGNIFICANT CHANGE UP (ref 3.5–5)
PROT SERPL-MCNC: 7.8 G/DL — SIGNIFICANT CHANGE UP (ref 6–8)
RBC # BLD: 4.25 M/UL — SIGNIFICANT CHANGE UP (ref 4.2–5.4)
RBC # FLD: 12.3 % — SIGNIFICANT CHANGE UP (ref 11.5–14.5)
RSV RNA NPH QL NAA+NON-PROBE: SIGNIFICANT CHANGE UP
SARS-COV-2 RNA SPEC QL NAA+PROBE: SIGNIFICANT CHANGE UP
SODIUM SERPL-SCNC: 143 MMOL/L — SIGNIFICANT CHANGE UP (ref 135–146)
TROPONIN T SERPL-MCNC: <0.01 NG/ML — SIGNIFICANT CHANGE UP
WBC # BLD: 6.04 K/UL — SIGNIFICANT CHANGE UP (ref 4.8–10.8)
WBC # FLD AUTO: 6.04 K/UL — SIGNIFICANT CHANGE UP (ref 4.8–10.8)

## 2023-04-23 PROCEDURE — 99285 EMERGENCY DEPT VISIT HI MDM: CPT

## 2023-04-23 PROCEDURE — 83880 ASSAY OF NATRIURETIC PEPTIDE: CPT

## 2023-04-23 PROCEDURE — 94640 AIRWAY INHALATION TREATMENT: CPT

## 2023-04-23 PROCEDURE — 80053 COMPREHEN METABOLIC PANEL: CPT

## 2023-04-23 PROCEDURE — 84484 ASSAY OF TROPONIN QUANT: CPT

## 2023-04-23 PROCEDURE — 96374 THER/PROPH/DIAG INJ IV PUSH: CPT

## 2023-04-23 PROCEDURE — 93005 ELECTROCARDIOGRAM TRACING: CPT

## 2023-04-23 PROCEDURE — 93010 ELECTROCARDIOGRAM REPORT: CPT

## 2023-04-23 PROCEDURE — 85025 COMPLETE CBC W/AUTO DIFF WBC: CPT

## 2023-04-23 PROCEDURE — 99285 EMERGENCY DEPT VISIT HI MDM: CPT | Mod: 25

## 2023-04-23 PROCEDURE — 71045 X-RAY EXAM CHEST 1 VIEW: CPT | Mod: 26

## 2023-04-23 PROCEDURE — 36415 COLL VENOUS BLD VENIPUNCTURE: CPT

## 2023-04-23 PROCEDURE — 71045 X-RAY EXAM CHEST 1 VIEW: CPT

## 2023-04-23 PROCEDURE — 0241U: CPT

## 2023-04-23 PROCEDURE — 83735 ASSAY OF MAGNESIUM: CPT

## 2023-04-23 RX ORDER — IPRATROPIUM/ALBUTEROL SULFATE 18-103MCG
3 AEROSOL WITH ADAPTER (GRAM) INHALATION
Refills: 0 | Status: COMPLETED | OUTPATIENT
Start: 2023-04-23 | End: 2023-04-23

## 2023-04-23 RX ADMIN — Medication 3 MILLILITER(S): at 14:18

## 2023-04-23 RX ADMIN — Medication 125 MILLIGRAM(S): at 13:44

## 2023-04-23 RX ADMIN — Medication 3 MILLILITER(S): at 13:44

## 2023-04-23 RX ADMIN — Medication 3 MILLILITER(S): at 13:23

## 2023-04-23 NOTE — ED PROVIDER NOTE - NSFOLLOWUPINSTRUCTIONS_ED_ALL_ED_FT
Please make sure to follow up with your primary care doctor in 3 days.    Shortness of breath could indicate a medical problem. Causes include lung diseases, heart disease, low amount of red blood cells (anemia), poor physical fitness, being overweight, smoking, etc. Your health care provider may not be able to find a cause for your shortness of breath after your exam. In this case, it is important to have a follow-up exam with your primary care physician as instructed. If medicines were prescribed, take them as directed for the full length of time directed. Refrain from tobacco products.    SEEK IMMEDIATE MEDICAL CARE IF YOU HAVE THE FOLLOWING SYMPTOMS: worsening shortness of breath, chest pain, back pain, abdominal pain, fever, coughing up blood, lightheadedness/dizziness.

## 2023-04-23 NOTE — ED PROVIDER NOTE - PATIENT PORTAL LINK FT
You can access the FollowMyHealth Patient Portal offered by [facility] by registering at the following website: [email or web address]. By joining [email or web address]’s FollowMyHealth portal, you will also be able to view your health information using other applications (apps) compatible with our system.

## 2023-04-23 NOTE — ED PROVIDER NOTE - PROGRESS NOTE DETAILS
Labs unremarkable.  EKG is consistent with her previous EKG.  Chest x-ray shows no evidence of pneumonia.  Meds given and symptoms subsided.  Patient is stable for discharge.  I will send steroids to pharmacy.

## 2023-04-23 NOTE — ED ADULT TRIAGE NOTE - GLASGOW COMA SCALE: EYE OPENING, MLM
Conveyed lab results from 06/17/2019.  ----- Message from KASIA Yao sent at 6/18/2019 12:42 PM CDT -----  Please notify the patient of results. Labs look great! Vitamin D only slightly low, recommend over the counter D3 800IU daily. Repeat labs in one year with next CPE.  Follow-up as planned.    Patient states an understanding with no further questions.   (E4) spontaneous

## 2023-04-23 NOTE — ED PROVIDER NOTE - CLINICAL SUMMARY MEDICAL DECISION MAKING FREE TEXT BOX
Patient presented with shortness of breath and wheezing which patient states is similar to prior COPD exacerbations.  Otherwise on arrival patient afebrile, hemodynamically stable, no acute distress, normal O2 saturation on room air and speaking full sentences.  Wheezing noted to be scattered bilaterally.  EKG obtained and nonspecific, but no change from prior EKG, no evidence of STEMI.  Obtained labs which were grossly unremarkable including no significant leukocytosis, anemia, signs of dehydration/ZAIN, transaminitis or significant electrolyte abnormalities.  Troponin negative.  proBNP within normal limits.  Chest xray negative for pneumothorax, pneumonia, widened mediastinum, evidence of rib fractures, enlarged cardiac silhouette or any other emergent pathologies.  Wheezing completely resolved after steroids and nebs in the ED after which time patient ambulatory without desaturation, tolerating p.o., feels comfortable going home.  Given the above, will discharge home with outpatient follow up. Patient agreeable with plan. Agrees to return to ED for any new or worsening symptoms.

## 2023-04-23 NOTE — ED PROVIDER NOTE - OBJECTIVE STATEMENT
52-year-old female with past medical history of COPD, GERD, and hypothyroidism who presents with shortness of breath and weakness.  Reports that shortness of breath started yesterday and worse with movement.  Reports that symptom is very similar with previous COPD exacerbation.  Reports that she took nebulizer and inhaler at home, but symptoms did not improve, so decided to come to the ED.  Reports that prednisone normally helps her symptoms. Reports that she is still a smoker.  Denies recent traveling, surgery, immobilization, history of blood clot/cancer, and hormone supplement use.  Denies fever, chest pain, nausea, vomiting, abdominal pain, urinary symptoms, change in bowel movement.

## 2023-04-23 NOTE — ED PROVIDER NOTE - DIFFERENTIAL DIAGNOSIS
Dyspnea, wheezing, likely COPD exacerbation but r/o ACS vs pneumonia vs fluid overload vs pneumothorax Differential Diagnosis

## 2023-05-02 ENCOUNTER — EMERGENCY (EMERGENCY)
Facility: HOSPITAL | Age: 53
LOS: 0 days | Discharge: HOME | End: 2023-05-02
Attending: EMERGENCY MEDICINE
Payer: MEDICAID

## 2023-05-02 VITALS
TEMPERATURE: 98 F | WEIGHT: 125 LBS | OXYGEN SATURATION: 100 % | RESPIRATION RATE: 18 BRPM | SYSTOLIC BLOOD PRESSURE: 126 MMHG | HEIGHT: 65 IN | HEART RATE: 107 BPM | DIASTOLIC BLOOD PRESSURE: 78 MMHG

## 2023-05-02 DIAGNOSIS — K21.9 GASTRO-ESOPHAGEAL REFLUX DISEASE WITHOUT ESOPHAGITIS: ICD-10-CM

## 2023-05-02 DIAGNOSIS — K04.7 PERIAPICAL ABSCESS WITHOUT SINUS: ICD-10-CM

## 2023-05-02 DIAGNOSIS — J44.9 CHRONIC OBSTRUCTIVE PULMONARY DISEASE, UNSPECIFIED: ICD-10-CM

## 2023-05-02 DIAGNOSIS — K46.9 UNSPECIFIED ABDOMINAL HERNIA WITHOUT OBSTRUCTION OR GANGRENE: Chronic | ICD-10-CM

## 2023-05-02 DIAGNOSIS — Z91.041 RADIOGRAPHIC DYE ALLERGY STATUS: ICD-10-CM

## 2023-05-02 DIAGNOSIS — Z87.891 PERSONAL HISTORY OF NICOTINE DEPENDENCE: ICD-10-CM

## 2023-05-02 DIAGNOSIS — R22.0 LOCALIZED SWELLING, MASS AND LUMP, HEAD: ICD-10-CM

## 2023-05-02 DIAGNOSIS — E03.9 HYPOTHYROIDISM, UNSPECIFIED: ICD-10-CM

## 2023-05-02 DIAGNOSIS — Z87.19 PERSONAL HISTORY OF OTHER DISEASES OF THE DIGESTIVE SYSTEM: ICD-10-CM

## 2023-05-02 DIAGNOSIS — Z86.2 PERSONAL HISTORY OF DISEASES OF THE BLOOD AND BLOOD-FORMING ORGANS AND CERTAIN DISORDERS INVOLVING THE IMMUNE MECHANISM: ICD-10-CM

## 2023-05-02 PROCEDURE — D0140: CPT

## 2023-05-02 PROCEDURE — 99284 EMERGENCY DEPT VISIT MOD MDM: CPT

## 2023-05-02 PROCEDURE — D0330: CPT

## 2023-05-02 PROCEDURE — 99283 EMERGENCY DEPT VISIT LOW MDM: CPT

## 2023-05-02 PROCEDURE — D0230: CPT

## 2023-05-02 RX ORDER — AMOXICILLIN 250 MG/5ML
1 SUSPENSION, RECONSTITUTED, ORAL (ML) ORAL
Qty: 21 | Refills: 0
Start: 2023-05-02 | End: 2023-05-08

## 2023-05-02 NOTE — CONSULT NOTE ADULT - SUBJECTIVE AND OBJECTIVE BOX
Patient is a 52y old  Female who presents with a chief complaint of  dental pain    HPI:   Patient had lip swelling started yesterday    PAST MEDICAL & SURGICAL HISTORY:  COPD (chronic obstructive pulmonary disease)      Hyperthyroidism      GERD (gastroesophageal reflux disease)      Anemia      Umbilical hernia      Abdominal hernia        ( -  ) heart valve replacement  ( - ) joint replacement  ( -  ) pregnancy    MEDICATIONS  (STANDING):    MEDICATIONS  (PRN):      Allergies    contrast media (iodine-based) (Hives)    Intolerances        FAMILY HISTORY:      *Last Dental Visit: unknown    Vital Signs Last 24 Hrs  T(C): 36.7 (02 May 2023 08:16), Max: 36.7 (02 May 2023 08:16)  T(F): 98 (02 May 2023 08:16), Max: 98 (02 May 2023 08:16)  HR: 107 (02 May 2023 08:16) (107 - 107)  BP: 126/78 (02 May 2023 08:16) (126/78 - 126/78)  BP(mean): --  RR: 18 (02 May 2023 08:16) (18 - 18)  SpO2: 100% (02 May 2023 08:16) (100% - 100%)    Parameters below as of 02 May 2023 08:16  Patient On (Oxygen Delivery Method): room air        LABS:                  EOE:  TMJ (  - ) clicks                     ( -  ) pops                     (  - ) crepitus             Mandible <<FROM>>             Facial bones and MOM <<grossly intact>>             ( -  ) trismus             ( -  ) lymphadenopathy             ( +  ) swelling             ( -  ) asymmetry             (  + ) palpation             ( -  ) dyspnea             ( -  ) dysphagia             ( -  ) loss of consciousness  EOE, lip swelling noted, tender on palpation  IOE:  <<permanent>> dentition: <<grossly intact>>            hard/soft palate:  (  - ) palatal torus, <<No pathology noted>>           tongue/FOM <<No pathology noted>>           labial/buccal mucosa <<No pathology noted>>           ( +  ) percussion           ( + ) palpation           ( +  ) swelling            ( -  ) abscess           ( -  ) sinus tract  IOE, multiple maxillary teeth retained root tips noted. #7,8 buccal swelling noted, tender on palpation and percussion.  Dentition present: << y  >>  Mobility: << n >>  Caries: << y  >>       *DENTAL RADIOGRAPHS: 1 panoramic, 1 periapical. Multiple retained root tips noted maxillary and mandibular. periapical radioluceny noted #8,9    RADIOLOGY & ADDITIONAL STUDIES:    *ASSESSMENT: Patient is a 52y old  Female who presents with a chief complaint of  dental pain. Patient had lip swelling started yesterday. EOE, lip swelling noted, tender on palpation. IOE, multiple maxillary teeth retained root tips noted. #7,8 buccal swelling noted, tender on palpation and percussion. 1 panoramic, 1 periapical. Multiple retained root tips noted maxillary and mandibular. periapical radioluceny noted #8,9      *PLAN: Informed patient retained root tips need to be extracted. Recommend extraction #,7,8,9 today due to lip swelling. Patient agree and understand . Ext #7,8,9 planned. Patient will return for future treatment for remaining root tips.    PROCEDURE:    Treatment consequences explained as per OS sheet dated 07/13/00. Consent obtained, side site marked. Administered 1x Carpule Septocaine 4% 1:100,000 epinephrine via buccal and palatal infiltration. #7,8,9 Elevated and delivered the tooth via forceps Curetted and irrigated the socket. Post-operative x-ray taken- negative. Hemostasis obtained. Post-operative instructions given verbally and written.     Amoxicillin 500 mg tablet, quantity 21 tablets, 7 day supply. Sig: Take one tablet by oral route every 8 hours for 7 days.   Ibuprofen 600 mg tablet, quantity 20, 5 day supply. Sig: take 1 tablet by oral route every 6 hours as needed for pain. Take with food.     RECOMMENDATIONS:  1) Complete medication as prescribed  2) Dental F/U with outpatient dentist for comprehensive dental care.   3) If any difficulty swallowing/breathing, fever occur, return to ER.     Naren Kebede DDS, [id number]

## 2023-05-02 NOTE — ED PROVIDER NOTE - PATIENT PORTAL LINK FT
You can access the FollowMyHealth Patient Portal offered by [facility] by registering at the following website: [email or web address]. By joining [employer]’s FollowMyHealth portal, you will also be able to view your health information using other applications (apps) compatible with our system.

## 2023-05-02 NOTE — ED PROVIDER NOTE - ATTENDING CONTRIBUTION TO CARE
52-year-old female history of COPD, smoker, poor dentition, takes daily prednisone, having poor dentition has been having pain to the right upper gum for 3 days to: Ibuprofen last night.  Woke up today with a swelling on the right side of the face up to the eye.    Vital stable nontoxic, full sentences, normal uvula, no angioedema, poor dentition tenderness to the right upper gum with pus drainage around tooth #7/8 no evidence of Derek's.  No focal neurodeficits.      Requires dental eval

## 2023-05-02 NOTE — ED PROVIDER NOTE - OBJECTIVE STATEMENT
53 yo F with PMH of COPD, GERD, and hypothyroidism presents to the ED complaining of swelling to upper lip and right side of the face. Patient has been taking Prednisone 50 daily for approximately 1 year and has been having her teeth crack and fall out over this time. Patient began to experience pain to right upper gum 3 days ago, took Ibuprofen last night, and woke up with the swelling this morning. Patient has not had fevers, difficulty breathing, cough, or sore throat.

## 2023-05-02 NOTE — ED ADULT NURSE NOTE - OBJECTIVE STATEMENT
Pt. came to ER complaining of gum pain and difficulty swallowing, Right side face swelling. Airway patent.

## 2023-05-02 NOTE — ED ADULT TRIAGE NOTE - NSWEIGHTCALCTOOLDRUG_GEN_A_CORE
Update History & Physical     The patient's History and Physical of 9/29/2022 was reviewed with the patient and I examined the patient. There was no change. The surgical site was confirmed by the patient and me. Plan: The risks, benefits, expected outcome, and alternative to the recommended procedure have been discussed with the patient / family. Patient understands and wants to proceed with the procedure.       Electronically signed by Lj Douglas MD on 10/3/2022 at 7:10 AM
 used

## 2023-05-02 NOTE — ED PROVIDER NOTE - NSFOLLOWUPCLINICS_GEN_ALL_ED_FT
[facility]  Dental  [address] [place] [id number]  Phone: [phone]  Fax:   Follow Up Time: 4-6 Days

## 2023-05-02 NOTE — ED ADULT TRIAGE NOTE - CHIEF COMPLAINT QUOTE
Patient BIBA for swelling to the lips and throat- patient took ibuprofen last night and woke up this morning with swelling.

## 2023-05-02 NOTE — ED PROVIDER NOTE - PHYSICAL EXAMINATION
PHYSICAL EXAM: I have reviewed current vital signs.  GENERAL: NAD, well-nourished; well-developed.  HEAD:  Normocephalic, atraumatic.  EYES: Conjunctiva and sclera clear.  ENT: Tenderness to front of right upper gum with pus drainage from tooth #8. Poor dental hygiene, numerous teeth missing. Edema to upper lip.   NECK: Supple, no JVD.  CHEST/LUNG: Clear to auscultation bilaterally; no wheezes, rales, or rhonchi.  HEART: Regular rate and rhythm, normal S1 and S2; no murmurs, rubs, or gallops.  ABDOMEN: Soft, nontender. Hernia noted.  EXTREMITIES:  2+ peripheral pulses; no clubbing, cyanosis, or edema.  PSYCH: Cooperative, appropriate, normal mood and affect.  NEUROLOGY: A&O x 3. No focal neurological deficits.   SKIN: Warm and dry.

## 2023-05-02 NOTE — ED PROVIDER NOTE - NSFOLLOWUPINSTRUCTIONS_ED_ALL_ED_FT
Dental Abscess  Cross-sectional view of two teeth: one tooth is normal and the other tooth has an abscess.  A dental abscess is an area of pus in or around a tooth. It comes from an infection. It can cause pain and other symptoms. Treatment will help with symptoms and prevent the infection from spreading.    What are the causes?  This condition is caused by an infection in or around the tooth. This can be from:  Very bad tooth decay (cavities).  A bad injury to the tooth, such as a broken or chipped tooth.  What increases the risk?  The risk to get an abscess is higher in males. It is also more likely in people who:  Have dental decay.  Have very bad gum disease.  Eat sugary snacks between meals.  Use tobacco.  Have diabetes.  Have a weak disease-fighting system (immune system).  Do not brush their teeth regularly.  What are the signs or symptoms?  Some mild symptoms are:  Tenderness.  Bad breath.  Fever.  A sharp, sour taste in the mouth.  Pain in and around the infected tooth.  Worse symptoms of this condition include:  Swollen neck glands.  Chills.  Pus draining around the tooth.  Swelling and redness around the tooth, the mouth, or the face.  Very bad pain in and around the tooth.  The worst symptoms can include:  Difficulty swallowing.  Difficulty opening your mouth.  Feeling like you may vomit or vomiting.  How is this treated?  This is treated by getting rid of the infection. Your dentist will discuss ways to do this, including:  Antibiotic medicines.  Antibacterial mouth rinse.  An incision in the abscess to drain out the pus.  A root canal.  Removing the tooth.  Follow these instructions at home:  Medicines    Take over-the-counter and prescription medicines only as told by your dentist.  If you were prescribed an antibiotic medicine, take it as told by your dentist. Do not stop taking it even if you start to feel better.  If you were prescribed a gel that has numbing medicine in it, use it exactly as told.  Ask your dentist if you should avoid driving or using machines while you are taking your medicine.  General instructions    Rinse your mouth often with salt water. To make salt water, dissolve ½–1 tsp (3–6 g) of salt in 1 cup (237 mL) of warm water.  Eat a soft diet while your mouth is healing.  Drink enough fluid to keep your pee (urine) pale yellow.  Do not apply heat to the outside of your mouth.  Do not smoke or use any products that contain nicotine or tobacco. If you need help quitting, ask your dentist.  Keep all follow-up visits.  Prevent an abscess    Brush your teeth every morning and every night. Use fluoride toothpaste.  Floss your teeth each day.  Get dental cleanings as often as told by your dentist.  Think about getting dental sealant put on teeth that have deep holes (decay).  Drink water that has fluoride in it.  Most tap water has fluoride.  Check the label on bottled water to see if it has fluoride in it.  Drink water instead of sugary drinks.  Eat healthy meals and snacks.  Wear a mouth guard or face shield when you play sports.  Contact a doctor if:  Your pain is worse and medicine does not help.  Get help right away if:  You have a fever or chills.  Your symptoms suddenly get worse.  You have a very bad headache.  You have problems breathing or swallowing.  You have trouble opening your mouth.  You have swelling in your neck or close to your eye.  These symptoms may be an emergency. Get help right away. Call your local emergency services (911 in the [place]  Do not wait to see if the symptoms will go away.  Do not drive yourself to the hospital.  Summary  A dental abscess is an area of pus in or around a tooth. It is caused by an infection.  Treatment will help with symptoms and prevent the infection from spreading.  Take over-the-counter and prescription medicines only as told by your dentist.  To prevent an abscess, take good care of your teeth. Brush your teeth every morning and night. Use floss every day.  Get dental cleanings as often as told by your dentist.  This information is not intended to replace advice given to you by your health care provider. Make sure you discuss any questions you have with your health care provider.

## 2023-05-02 NOTE — ED PROVIDER NOTE - CLINICAL SUMMARY MEDICAL DECISION MAKING FREE TEXT BOX
Dental abscess.  No evidence of angioedema allergic reaction.  Dental eval.  Airway intact.  No Derek's

## 2023-05-02 NOTE — ED ADULT NURSE NOTE - NSICDXPASTMEDICALHX_GEN_ALL_CORE_FT
PAST MEDICAL HISTORY:  Anemia     COPD (chronic obstructive pulmonary disease)     GERD (gastroesophageal reflux disease)     Hyperthyroidism     Umbilical hernia

## 2023-05-02 NOTE — ED PROVIDER NOTE - PROGRESS NOTE DETAILS
Note authored by Dr. Miranda: Sent for dental eval for abscess. AE: Patient sent back from dental feeling better. Patient instructed to complete course of antibiotics and to follow up with outpatient dentist.

## 2023-05-10 ENCOUNTER — NON-APPOINTMENT (OUTPATIENT)
Age: 53
End: 2023-05-10

## 2023-05-31 ENCOUNTER — APPOINTMENT (OUTPATIENT)
Dept: INTERNAL MEDICINE | Facility: CLINIC | Age: 53
End: 2023-05-31

## 2023-06-05 NOTE — ED PROVIDER NOTE - PRINCIPAL DIAGNOSIS
[facility] GERIATRICS    Chief Complaint   Patient presents with     RECHECK     HPI:  Suresh Powers is a 60 year old  (1962), who is being seen today for an episodic care visit at: [facility] [[id number]].     This is a 61 yo male with a PMHx of chronic alcohol use, chronic hyponatremia, HTN, PE on apixaban, GI bleed due to duodenal ulcer in 11/2022, BPH, solitary kidney, chronic depression, generalized anxiety disorder, OCD, and recurrent falls s/p left hip cephalomedullary nailing on 9/4/2022 for left intertrochanteric femur fracture after a fall while intoxicated. He presented to the ED on 5/23/2023 after going out for drinks with a friend and falling. On admission he was found to have severe hyponatremia, sodium 116. Nephrology consulted, sodium improved with IV normal saline and 1800mL fluid restriction, discharge sodium 137. He was also found to have a mildly displaced fracture of the distal left femoral metaphysis, s/p ORIF with interlocking distal femur plate on 5/25/2023. CT ankle also found acute nondisplaced fracture of the lateral malleolus extending to the distal fibular metaphysis and probable additional fracture of the fourth metatarsal base. Per ortho, non operative management with boot immobilization and weight bearing as tolerated. On 5/27 his hemoglobin dropped to 6.3, transfused 1 unit PRBC, discharge hemoglobin 7.5, continue Protonix 40mg twice daily. He was discharged to [facility] for rehab.     Today's concern is:   Pain control, urinary retention, therapy progres    Allergies, and PMH/PSH reviewed in [facility] today.  REVIEW OF SYSTEMS:  4 point ROS including Respiratory, CV, GI and , other than that noted in the HPI,  is negative    Objective:   /81   Pulse 80   Temp 97.8  F (36.6  C)   Resp 18   Wt 92.4 kg (203 lb 9.6 oz)   SpO2 96%   BMI 31.89 kg/m    GENERAL APPEARANCE:  Alert, in no distress, oriented  RESP:  respiratory effort and palpation of chest normal,  expiratory wheezes  CV:  Palpation and auscultation of heart done , regular rate and rhythm, no murmur, rub, or gallop, no edema  PSYCH:  oriented X 3, normal insight, judgement and memory. SLUMS 26/30      Most Recent 3 CBC's:  Recent Labs   Lab Test 06/01/23  0521 05/29/23  0700 05/28/23  0743 05/26/23  0649 05/25/23  0641 05/24/23  1424 05/24/23  0603   WBC 7.4  --   --   --  10.9  --  9.2   HGB 8.0* 7.5* 8.0*   < > 7.3*   < > 7.8*   MCV 85  --   --   --  80  --  77*   *  --   --   --  250  --  232    < > = values in this interval not displayed.     Most Recent 3 BMP's:  Recent Labs   Lab Test 06/01/23  0521 05/29/23  0700 05/28/23  0743    137 134*   POTASSIUM 4.5 4.7 4.2   CHLORIDE 102 100 96*   CO2 26 30* 29   BUN 15.2 15.3 14.6   CR 0.76 0.65*  0.65* 0.69   ANIONGAP 9 7 9   NIMISHA 9.0 8.8 8.4*   * 121* 181*       Assessment/Plan:     Mildly displaced fracture of the distal left femoral metaphysis, s/p ORIF with interlocking distal femur plate on 5/25/2023   Acute nondisplaced fracture of the left lateral malleolus extending to the distal fibular metaphysis and probable additional fracture of the fourth metatarsal base  S/p left hip cephalomedullary nailing, 9/4/2022 for left intertrochanteric femur fracture  Given 25% weight bearing on LLE, hinged knee brace locked in extension, f/u with ortho on 6/9 per Dr. Pompa. Continue Ca/vit supplementation. No change     Right ankle/foot fractures  Weight bearing as tolerated     Pain  Today decrease Tylenol to 1000 mg 3 times daily, continue Vistaril 25 mg 4 times daily, methocarbamol 1000 mg 3 times daily, oxycodone 5 mg of 4 hours as needed (2-3x/day) and encourage icing. Today reports pain is well controlled     PE (H)  Continue Apixaban 5mg BID, no change     HTN  Continue amlodipine 5 mg daily. Today, Bps noted to be -140, HR <70s. Continue to monitor     BPH/Solitary kidney/urinary retention  Donated left kidney to sister in  1990  History of urinary retention and vasquez catheter after previous surgeries, vasquez placed due to retention  Patient declined voiding trial at this time due to immobility and pain, will reassess when mobility improves. Continue tamsulosin 0.4mg daily at bedtime.  Daily care as ordered. Revisited removing vasquez today, patient continues to decline voiding trial until able to stand, will have f/u with surgeon on 6/9. Will revisit vasquez removal on next visit     Diminished lung sounds  Encourage incentive spirometry     Staph bacteremia, likely contaminant  Blood cultures were obtained and patient was started on IV cefazolin, then switched to IV vancomycin with concern for GPC in clusters, possibly MRSA.  Repeat BCs NGTD. Resolved     Renal fx  Last Cr 0.76 with GFR >60 on 6/1     Hyponatremia  Beer potomania (H)  SIADH  MIKAYLA 0.23%  Last Na 137 on 6/1. Discontinued fluid restriction today     ABLA/GERD/Hx GI bleed  Previously received 1U PRBC, continue protonix 40mg BID.  Last Hgb 8.0 on 6/1, continue FeSO4 325mg daily     Chronic depression/SPIKE/OCD  Continue buspar 15mg BID, lexapro 20mg daily, trazadone 150mg daily at bedtime, and lamotrigine 200mg daily at bedtime. Today reports to be sleeping well    Therapy  Using WC per WB status, strengthening    Orders:  discontinue FR, decrease tylenol    Electronically signed by: Todd Morocho NP      I was present with the student who particpated in the serivce and documentation of the note. I have verified the history and personally peformed the physical exam and medical decision making. I agree with the assessment and plan of care as documented in the note.          COPD (chronic obstructive pulmonary disease)

## 2023-07-18 ENCOUNTER — APPOINTMENT (OUTPATIENT)
Dept: PULMONOLOGY | Facility: CLINIC | Age: 53
End: 2023-07-18

## 2023-07-18 ENCOUNTER — APPOINTMENT (OUTPATIENT)
Dept: CARDIOLOGY | Facility: CLINIC | Age: 53
End: 2023-07-18

## 2023-07-24 ENCOUNTER — APPOINTMENT (OUTPATIENT)
Dept: OBGYN | Facility: CLINIC | Age: 53
End: 2023-07-24

## 2023-08-02 PROBLEM — K42.9 UMBILICAL HERNIA WITHOUT OBSTRUCTION OR GANGRENE: Chronic | Status: ACTIVE | Noted: 2023-05-02

## 2023-08-05 ENCOUNTER — NON-APPOINTMENT (OUTPATIENT)
Age: 53
End: 2023-08-05

## 2023-09-05 ENCOUNTER — EMERGENCY (EMERGENCY)
Facility: HOSPITAL | Age: 53
LOS: 0 days | Discharge: HOME | End: 2023-09-05
Attending: EMERGENCY MEDICINE
Payer: MEDICAID

## 2023-09-05 VITALS
RESPIRATION RATE: 17 BRPM | WEIGHT: 119.05 LBS | SYSTOLIC BLOOD PRESSURE: 144 MMHG | HEART RATE: 78 BPM | OXYGEN SATURATION: 98 % | DIASTOLIC BLOOD PRESSURE: 85 MMHG | TEMPERATURE: 98 F

## 2023-09-05 DIAGNOSIS — J44.9 CHRONIC OBSTRUCTIVE PULMONARY DISEASE, UNSPECIFIED: ICD-10-CM

## 2023-09-05 DIAGNOSIS — R07.81 PLEURODYNIA: ICD-10-CM

## 2023-09-05 DIAGNOSIS — Z91.041 RADIOGRAPHIC DYE ALLERGY STATUS: ICD-10-CM

## 2023-09-05 DIAGNOSIS — K21.9 GASTRO-ESOPHAGEAL REFLUX DISEASE WITHOUT ESOPHAGITIS: ICD-10-CM

## 2023-09-05 DIAGNOSIS — F17.200 NICOTINE DEPENDENCE, UNSPECIFIED, UNCOMPLICATED: ICD-10-CM
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
## 2023-09-05 DIAGNOSIS — K46.9 UNSPECIFIED ABDOMINAL HERNIA WITHOUT OBSTRUCTION OR GANGRENE: Chronic | ICD-10-CM
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
## 2023-09-05 DIAGNOSIS — Z86.2 PERSONAL HISTORY OF DISEASES OF THE BLOOD AND BLOOD-FORMING ORGANS AND CERTAIN DISORDERS INVOLVING THE IMMUNE MECHANISM: ICD-10-CM

## 2023-09-05 DIAGNOSIS — F32.A DEPRESSION, UNSPECIFIED: ICD-10-CM

## 2023-09-05 DIAGNOSIS — E03.9 HYPOTHYROIDISM, UNSPECIFIED: ICD-10-CM

## 2023-09-05 DIAGNOSIS — S22.32XA FRACTURE OF ONE RIB, LEFT SIDE, INITIAL ENCOUNTER FOR CLOSED FRACTURE: ICD-10-CM

## 2023-09-05 PROCEDURE — 99284 EMERGENCY DEPT VISIT MOD MDM: CPT

## 2023-09-05 PROCEDURE — 71250 CT THORAX DX C-: CPT | Mod: MA

## 2023-09-05 PROCEDURE — 99284 EMERGENCY DEPT VISIT MOD MDM: CPT | Mod: 25

## 2023-09-05 PROCEDURE — 71250 CT THORAX DX C-: CPT | Mod: 26,MA

## 2023-09-05 PROCEDURE — 96372 THER/PROPH/DIAG INJ SC/IM: CPT

## 2023-09-05 RX ORDER — KETOROLAC TROMETHAMINE 30 MG/ML
15 SYRINGE (ML) INJECTION ONCE
Refills: 0 | Status: DISCONTINUED | OUTPATIENT
Start: 2023-09-05 | End: 2023-09-05

## 2023-09-05 RX ORDER — IBUPROFEN 200 MG
1 TABLET ORAL
Qty: 40 | Refills: 0
Start: 2023-09-05 | End: 2023-09-14

## 2023-09-05 RX ADMIN — Medication 15 MILLIGRAM(S): at 00:45

## 2023-09-05 NOTE — ED PROVIDER NOTE - NSFOLLOWUPINSTRUCTIONS_ED_ALL_ED_FT
Rib fracture:  A rib fracture is a break or crack in one of the bones of the ribs. The ribs are like a cage that goes around your upper chest. A broken or cracked rib is often painful, but most do not cause other problems. Most rib fractures usually heal on their own in 1–3 months.    Follow these instructions at home:  Managing pain, stiffness, and swelling     If directed, apply ice to the injured area.    Put ice in a plastic bag.  Place a towel between your skin and the bag.  Leave the ice on for 20 minutes, 2–3 times a day.    Take over-the-counter and prescription medicines only as told by your doctor.  Activity     Avoid activities that cause pain to the injured area. Protect your injured area.  Slowly increase activity as told by your doctor.  General instructions     Do deep breathing as told by your doctor. You may be told to:    Take deep breaths many times a day.  Cough many times a day while hugging a pillow.  Use a device (incentive spirometer) to do deep breathing many times a day.    Drink enough fluid to keep your pee (urine) clear or pale yellow.  Do not wear a rib belt or binder. These do not allow you to breathe deeply.  Keep all follow-up visits as told by your doctor. This is important.  Contact a doctor if:  You have a fever.  Get help right away if:  You have trouble breathing.  You are short of breath.  You cannot stop coughing.  You cough up thick or bloody spit (sputum).  You feel sick to your stomach (nauseous), throw up (vomit), or have belly (abdominal) pain.  Your pain gets worse and medicine does not help.  Summary  A rib fracture is a break or crack in one of the bones of the ribs.  Apply ice to the injured area and take medicines for pain as told by your doctor.  Take deep breaths and cough many times a day. Hug a pillow every time you cough. Follow-up with PCP in 1-3 days.    Rib fracture:  A rib fracture is a break or crack in one of the bones of the ribs. The ribs are like a cage that goes around your upper chest. A broken or cracked rib is often painful, but most do not cause other problems. Most rib fractures usually heal on their own in 1–3 months.    Follow these instructions at home:  Managing pain, stiffness, and swelling     If directed, apply ice to the injured area.    Put ice in a plastic bag.  Place a towel between your skin and the bag.  Leave the ice on for 20 minutes, 2–3 times a day.    Take over-the-counter and prescription medicines only as told by your doctor.  Activity     Avoid activities that cause pain to the injured area. Protect your injured area.  Slowly increase activity as told by your doctor.  General instructions     Do deep breathing as told by your doctor. You may be told to:    Take deep breaths many times a day.  Cough many times a day while hugging a pillow.  Use a device (incentive spirometer) to do deep breathing many times a day.    Drink enough fluid to keep your pee (urine) clear or pale yellow.  Do not wear a rib belt or binder. These do not allow you to breathe deeply.  Keep all follow-up visits as told by your doctor. This is important.  Contact a doctor if:  You have a fever.  Get help right away if:  You have trouble breathing.  You are short of breath.  You cannot stop coughing.  You cough up thick or bloody spit (sputum).  You feel sick to your stomach (nauseous), throw up (vomit), or have belly (abdominal) pain.  Your pain gets worse and medicine does not help.  Summary  A rib fracture is a break or crack in one of the bones of the ribs.  Apply ice to the injured area and take medicines for pain as told by your doctor.  Take deep breaths and cough many times a day. Hug a pillow every time you cough.

## 2023-09-05 NOTE — ED PROVIDER NOTE - CLINICAL SUMMARY MEDICAL DECISION MAKING FREE TEXT BOX
52-year-old female presents emergency department for left rib pain after she fell.  Patient was found to have 1 rib fracture.  DC home with strict return precautions.

## 2023-09-05 NOTE — ED PROVIDER NOTE - PATIENT PORTAL LINK FT
You can access the FollowMyHealth Patient Portal offered by [facility] by registering at the following website: [email or web address]. By joining [facility]’s FollowMyHealth portal, you will also be able to view your health information using other applications (apps) compatible with our system.

## 2023-09-05 NOTE — ED PROVIDER NOTE - ATTENDING APP SHARED VISIT CONTRIBUTION OF CARE
52-year-old female with PMH Of hypothyroidism presenting to the emergency department for left-sided rib pain after she fell on a glass yvette jar.  Since then she has been having left rib pain worse with inspiration and movement. No head trauma or other injuries.     Const: NAD  Eyes: PERRL, no conjunctival injection  HENT:  Neck supple without meningismus   CV: RRR, Warm, well-perfused extremities  RESP: CTA B/L, no tachypnea   GI: soft, non-tender, non-distended  MSK: No gross deformities appreciated, L rib tenderness   Skin: Warm, dry. No rashes  Neuro: Alert, CNs II-XII grossly intact. Sensation and motor function of extremities grossly intact.  Psych: Appropriate mood and affect.    will do ct chest and give toradol for pain

## 2023-09-05 NOTE — ED PROVIDER NOTE - PROGRESS NOTE DETAILS
AY: CT results endorsed to patient, patient given opportunity to ask questions and endorsed understanding. The patient was given detailed return precautions and advised to return to the emergency department if any new symptoms developed, symptoms worsened or for any concerns. The patient was offered the opportunity to ask questions and verbalized that they understand the diagnosis and discharge instructions.

## 2023-09-05 NOTE — ED PROVIDER NOTE - OBJECTIVE STATEMENT
52-year-old female with past medical history hypothyroidism, COPD, depression, anemia, GERD presents complaint of fall.  Patient reports she had a mechanical fall onto her left side, fell onto a glass yvette jar at home.  Denies head trauma, LOC, visual changes, nausea/vomiting.  Denies headache, chest pain, shortness of breath, abdominal pain, N/V/D, change in bowel/bladder habits, numbness/tingling.

## 2023-09-05 NOTE — ED ADULT NURSE NOTE - OBJECTIVE STATEMENT
Pt. complains of left rib pain. Pt. status post fall at home this afternoon. Denies any head trauma or LOC. No use of anticoagulants

## 2023-09-05 NOTE — ED PROVIDER NOTE - CARE PROVIDER_API CALL
Louis Dinh  Internal Medicine  [address] [place] [id number]  Phone: [phone]  Fax: [phone]  Scheduled Appointment: 09/15/2023

## 2023-09-05 NOTE — ED ADULT NURSE NOTE - NSFALLRISKINTERV_ED_ALL_ED

## 2023-09-05 NOTE — ED PROVIDER NOTE - PHYSICAL EXAMINATION
Constitutional: Well developed, well nourished. NAD  TRAUMA: ABC intact. GCS 15.   Head: Normocephalic, atraumatic.  Eyes: PERRL. EOMI. No Raccoon eyes.   ENT: No nasal discharge.  Neck: Supple. Painless ROM. No midline tenderness or stepoffs.  Cardiovascular: Normal S1, S2. Regular rate and rhythm. No murmurs, rubs, or gallops.  Pulmonary: Normal respiratory rate and effort. Lungs clear to auscultation bilaterally. No wheezing, rales, or rhonchi.  CHEST: Left sided chest wall tenderness without crepitus.  Abdominal: Soft. Nondistended. Nontender.   BACK: No midline T/L/S tenderness or stepoffs.  Extremities. Pelvis stable. No traumatic deformities or tenderness of extremities.  Skin: No rashes, cyanosis, lacerations, or abrasions.  Neuro: AAOx3. Strength 5/5 in all extremities. Sensation intact throughout. No focal neurological deficits.

## 2023-09-05 NOTE — ED PROVIDER NOTE - CARE PROVIDERS DIRECT ADDRESSES
[email or web address]

## 2023-09-15 ENCOUNTER — OUTPATIENT (OUTPATIENT)
Dept: OUTPATIENT SERVICES | Facility: HOSPITAL | Age: 53
LOS: 1 days | End: 2023-09-15
Payer: MEDICAID

## 2023-09-15 ENCOUNTER — APPOINTMENT (OUTPATIENT)
Dept: INTERNAL MEDICINE | Facility: CLINIC | Age: 53
End: 2023-09-15
Payer: MEDICAID

## 2023-09-15 VITALS
DIASTOLIC BLOOD PRESSURE: 76 MMHG | WEIGHT: 117 LBS | HEART RATE: 59 BPM | TEMPERATURE: 97 F | OXYGEN SATURATION: 98 % | SYSTOLIC BLOOD PRESSURE: 114 MMHG | HEIGHT: 65 IN | BODY MASS INDEX: 19.49 KG/M2

## 2023-09-15 DIAGNOSIS — E55.9 VITAMIN D DEFICIENCY, UNSPECIFIED: ICD-10-CM

## 2023-09-15 DIAGNOSIS — Z00.00 ENCOUNTER FOR GENERAL ADULT MEDICAL EXAMINATION WITHOUT ABNORMAL FINDINGS: ICD-10-CM

## 2023-09-15 DIAGNOSIS — Z23 ENCOUNTER FOR IMMUNIZATION: ICD-10-CM

## 2023-09-15 DIAGNOSIS — J44.9 CHRONIC OBSTRUCTIVE PULMONARY DISEASE, UNSPECIFIED: ICD-10-CM

## 2023-09-15 DIAGNOSIS — I10 ESSENTIAL (PRIMARY) HYPERTENSION: ICD-10-CM

## 2023-09-15 DIAGNOSIS — K21.9 GASTRO-ESOPHAGEAL REFLUX DISEASE WITHOUT ESOPHAGITIS: ICD-10-CM

## 2023-09-15 DIAGNOSIS — E05.90 THYROTOXICOSIS, UNSPECIFIED WITHOUT THYROTOXIC CRISIS OR STORM: ICD-10-CM

## 2023-09-15 PROCEDURE — 99214 OFFICE O/P EST MOD 30 MIN: CPT | Mod: GC

## 2023-09-15 PROCEDURE — 99214 OFFICE O/P EST MOD 30 MIN: CPT

## 2023-10-09 NOTE — ED ADULT TRIAGE NOTE - AS HEIGHT TYPE
Mid-Level Procedure Text (A): After obtaining clear surgical margins the patient was sent to a mid-level provider for surgical repair.  The patient understands they will receive post-surgical care and follow-up from the mid-level provider. stated

## 2024-02-15 ENCOUNTER — INPATIENT (INPATIENT)
Facility: HOSPITAL | Age: 54
LOS: 4 days | Discharge: HOME HEALTH | DRG: 720 | End: 2024-02-20
Attending: STUDENT IN AN ORGANIZED HEALTH CARE EDUCATION/TRAINING PROGRAM | Admitting: STUDENT IN AN ORGANIZED HEALTH CARE EDUCATION/TRAINING PROGRAM
Payer: MEDICAID

## 2024-02-15 VITALS
TEMPERATURE: 100 F | SYSTOLIC BLOOD PRESSURE: 144 MMHG | HEART RATE: 126 BPM | OXYGEN SATURATION: 97 % | RESPIRATION RATE: 19 BRPM | DIASTOLIC BLOOD PRESSURE: 95 MMHG

## 2024-02-15 DIAGNOSIS — M79.81 NONTRAUMATIC HEMATOMA OF SOFT TISSUE: ICD-10-CM

## 2024-02-15 DIAGNOSIS — J96.01 ACUTE RESPIRATORY FAILURE WITH HYPOXIA: ICD-10-CM

## 2024-02-15 DIAGNOSIS — J44.1 CHRONIC OBSTRUCTIVE PULMONARY DISEASE WITH (ACUTE) EXACERBATION: ICD-10-CM

## 2024-02-15 DIAGNOSIS — A41.89 OTHER SPECIFIED SEPSIS: ICD-10-CM

## 2024-02-15 DIAGNOSIS — Z79.52 LONG TERM (CURRENT) USE OF SYSTEMIC STEROIDS: ICD-10-CM

## 2024-02-15 DIAGNOSIS — Z79.51 LONG TERM (CURRENT) USE OF INHALED STEROIDS: ICD-10-CM

## 2024-02-15 DIAGNOSIS — K21.9 GASTRO-ESOPHAGEAL REFLUX DISEASE WITHOUT ESOPHAGITIS: ICD-10-CM

## 2024-02-15 DIAGNOSIS — Z91.041 RADIOGRAPHIC DYE ALLERGY STATUS: ICD-10-CM

## 2024-02-15 DIAGNOSIS — Z87.891 PERSONAL HISTORY OF NICOTINE DEPENDENCE: ICD-10-CM

## 2024-02-15 DIAGNOSIS — J44.9 CHRONIC OBSTRUCTIVE PULMONARY DISEASE, UNSPECIFIED: ICD-10-CM

## 2024-02-15 DIAGNOSIS — E05.90 THYROTOXICOSIS, UNSPECIFIED WITHOUT THYROTOXIC CRISIS OR STORM: ICD-10-CM

## 2024-02-15 DIAGNOSIS — K42.9 UMBILICAL HERNIA WITHOUT OBSTRUCTION OR GANGRENE: ICD-10-CM

## 2024-02-15 DIAGNOSIS — I10 ESSENTIAL (PRIMARY) HYPERTENSION: ICD-10-CM

## 2024-02-15 DIAGNOSIS — J10.1 INFLUENZA DUE TO OTHER IDENTIFIED INFLUENZA VIRUS WITH OTHER RESPIRATORY MANIFESTATIONS: ICD-10-CM

## 2024-02-15 DIAGNOSIS — K46.9 UNSPECIFIED ABDOMINAL HERNIA WITHOUT OBSTRUCTION OR GANGRENE: Chronic | ICD-10-CM

## 2024-02-15 LAB
ALBUMIN SERPL ELPH-MCNC: 4.7 G/DL — SIGNIFICANT CHANGE UP (ref 3.5–5.2)
ALP SERPL-CCNC: 80 U/L — SIGNIFICANT CHANGE UP (ref 30–115)
ALT FLD-CCNC: 23 U/L — SIGNIFICANT CHANGE UP (ref 0–41)
ANION GAP SERPL CALC-SCNC: 17 MMOL/L — HIGH (ref 7–14)
AST SERPL-CCNC: 31 U/L — SIGNIFICANT CHANGE UP (ref 0–41)
BASE EXCESS BLDV CALC-SCNC: 1.7 MMOL/L — SIGNIFICANT CHANGE UP (ref -2–3)
BASOPHILS # BLD AUTO: 0.04 K/UL — SIGNIFICANT CHANGE UP (ref 0–0.2)
BASOPHILS NFR BLD AUTO: 0.6 % — SIGNIFICANT CHANGE UP (ref 0–1)
BILIRUB SERPL-MCNC: 0.8 MG/DL — SIGNIFICANT CHANGE UP (ref 0.2–1.2)
BUN SERPL-MCNC: 7 MG/DL — LOW (ref 10–20)
CA-I SERPL-SCNC: 1.17 MMOL/L — SIGNIFICANT CHANGE UP (ref 1.15–1.33)
CALCIUM SERPL-MCNC: 9.4 MG/DL — SIGNIFICANT CHANGE UP (ref 8.4–10.5)
CHLORIDE SERPL-SCNC: 101 MMOL/L — SIGNIFICANT CHANGE UP (ref 98–110)
CO2 SERPL-SCNC: 21 MMOL/L — SIGNIFICANT CHANGE UP (ref 17–32)
CREAT SERPL-MCNC: 0.7 MG/DL — SIGNIFICANT CHANGE UP (ref 0.7–1.5)
D DIMER BLD IA.RAPID-MCNC: 156 NG/ML DDU — SIGNIFICANT CHANGE UP
EGFR: 102 ML/MIN/1.73M2 — SIGNIFICANT CHANGE UP
EOSINOPHIL # BLD AUTO: 0.01 K/UL — SIGNIFICANT CHANGE UP (ref 0–0.7)
EOSINOPHIL NFR BLD AUTO: 0.1 % — SIGNIFICANT CHANGE UP (ref 0–8)
FLUAV AG NPH QL: DETECTED
FLUBV AG NPH QL: SIGNIFICANT CHANGE UP
GAS PNL BLDV: 132 MMOL/L — LOW (ref 136–145)
GAS PNL BLDV: SIGNIFICANT CHANGE UP
GLUCOSE SERPL-MCNC: 125 MG/DL — HIGH (ref 70–99)
HCO3 BLDV-SCNC: 24 MMOL/L — SIGNIFICANT CHANGE UP (ref 22–29)
HCT VFR BLD CALC: 39.3 % — SIGNIFICANT CHANGE UP (ref 37–47)
HCT VFR BLDA CALC: 43 % — SIGNIFICANT CHANGE UP (ref 34.5–46.5)
HGB BLD CALC-MCNC: 14.2 G/DL — SIGNIFICANT CHANGE UP (ref 11.7–16.1)
HGB BLD-MCNC: 13.7 G/DL — SIGNIFICANT CHANGE UP (ref 12–16)
IMM GRANULOCYTES NFR BLD AUTO: 0.3 % — SIGNIFICANT CHANGE UP (ref 0.1–0.3)
LACTATE BLDV-MCNC: 1.1 MMOL/L — SIGNIFICANT CHANGE UP (ref 0.5–2)
LYMPHOCYTES # BLD AUTO: 0.32 K/UL — LOW (ref 1.2–3.4)
LYMPHOCYTES # BLD AUTO: 4.7 % — LOW (ref 20.5–51.1)
MCHC RBC-ENTMCNC: 33.1 PG — HIGH (ref 27–31)
MCHC RBC-ENTMCNC: 34.9 G/DL — SIGNIFICANT CHANGE UP (ref 32–37)
MCV RBC AUTO: 94.9 FL — SIGNIFICANT CHANGE UP (ref 81–99)
MONOCYTES # BLD AUTO: 0.32 K/UL — SIGNIFICANT CHANGE UP (ref 0.1–0.6)
MONOCYTES NFR BLD AUTO: 4.7 % — SIGNIFICANT CHANGE UP (ref 1.7–9.3)
NEUTROPHILS # BLD AUTO: 6.17 K/UL — SIGNIFICANT CHANGE UP (ref 1.4–6.5)
NEUTROPHILS NFR BLD AUTO: 89.6 % — HIGH (ref 42.2–75.2)
NRBC # BLD: 0 /100 WBCS — SIGNIFICANT CHANGE UP (ref 0–0)
PCO2 BLDV: 32 MMHG — LOW (ref 39–42)
PH BLDV: 7.49 — HIGH (ref 7.32–7.43)
PLATELET # BLD AUTO: 242 K/UL — SIGNIFICANT CHANGE UP (ref 130–400)
PMV BLD: 10.1 FL — SIGNIFICANT CHANGE UP (ref 7.4–10.4)
PO2 BLDV: 60 MMHG — HIGH (ref 25–45)
POTASSIUM BLDV-SCNC: 3.7 MMOL/L — SIGNIFICANT CHANGE UP (ref 3.5–5.1)
POTASSIUM SERPL-MCNC: 4.1 MMOL/L — SIGNIFICANT CHANGE UP (ref 3.5–5)
POTASSIUM SERPL-SCNC: 4.1 MMOL/L — SIGNIFICANT CHANGE UP (ref 3.5–5)
PROT SERPL-MCNC: 8.1 G/DL — HIGH (ref 6–8)
RBC # BLD: 4.14 M/UL — LOW (ref 4.2–5.4)
RBC # FLD: 13.5 % — SIGNIFICANT CHANGE UP (ref 11.5–14.5)
RSV RNA NPH QL NAA+NON-PROBE: SIGNIFICANT CHANGE UP
SAO2 % BLDV: 91.4 % — HIGH (ref 67–88)
SARS-COV-2 RNA SPEC QL NAA+PROBE: SIGNIFICANT CHANGE UP
SODIUM SERPL-SCNC: 139 MMOL/L — SIGNIFICANT CHANGE UP (ref 135–146)
TROPONIN T, HIGH SENSITIVITY RESULT: 9 NG/L — SIGNIFICANT CHANGE UP (ref 6–13)
WBC # BLD: 6.88 K/UL — SIGNIFICANT CHANGE UP (ref 4.8–10.8)
WBC # FLD AUTO: 6.88 K/UL — SIGNIFICANT CHANGE UP (ref 4.8–10.8)

## 2024-02-15 PROCEDURE — 99285 EMERGENCY DEPT VISIT HI MDM: CPT

## 2024-02-15 PROCEDURE — 74177 CT ABD & PELVIS W/CONTRAST: CPT

## 2024-02-15 PROCEDURE — 94640 AIRWAY INHALATION TREATMENT: CPT

## 2024-02-15 PROCEDURE — 85379 FIBRIN DEGRADATION QUANT: CPT

## 2024-02-15 PROCEDURE — 80053 COMPREHEN METABOLIC PANEL: CPT

## 2024-02-15 PROCEDURE — 84443 ASSAY THYROID STIM HORMONE: CPT

## 2024-02-15 PROCEDURE — 83735 ASSAY OF MAGNESIUM: CPT

## 2024-02-15 PROCEDURE — 97161 PT EVAL LOW COMPLEX 20 MIN: CPT | Mod: GP

## 2024-02-15 PROCEDURE — 71045 X-RAY EXAM CHEST 1 VIEW: CPT | Mod: 26

## 2024-02-15 PROCEDURE — 80048 BASIC METABOLIC PNL TOTAL CA: CPT

## 2024-02-15 PROCEDURE — 84145 PROCALCITONIN (PCT): CPT

## 2024-02-15 PROCEDURE — 36415 COLL VENOUS BLD VENIPUNCTURE: CPT

## 2024-02-15 PROCEDURE — 81025 URINE PREGNANCY TEST: CPT

## 2024-02-15 PROCEDURE — 99222 1ST HOSP IP/OBS MODERATE 55: CPT

## 2024-02-15 PROCEDURE — 85025 COMPLETE CBC W/AUTO DIFF WBC: CPT

## 2024-02-15 PROCEDURE — 93010 ELECTROCARDIOGRAM REPORT: CPT

## 2024-02-15 RX ORDER — BUDESONIDE AND FORMOTEROL FUMARATE DIHYDRATE 160; 4.5 UG/1; UG/1
2 AEROSOL RESPIRATORY (INHALATION)
Refills: 0 | Status: DISCONTINUED | OUTPATIENT
Start: 2024-02-15 | End: 2024-02-20

## 2024-02-15 RX ORDER — FLUTICASONE FUROATE, UMECLIDINIUM BROMIDE AND VILANTEROL TRIFENATATE 200; 62.5; 25 UG/1; UG/1; UG/1
1 POWDER RESPIRATORY (INHALATION)
Refills: 0 | DISCHARGE

## 2024-02-15 RX ORDER — IPRATROPIUM/ALBUTEROL SULFATE 18-103MCG
3 AEROSOL WITH ADAPTER (GRAM) INHALATION EVERY 6 HOURS
Refills: 0 | Status: DISCONTINUED | OUTPATIENT
Start: 2024-02-15 | End: 2024-02-20

## 2024-02-15 RX ORDER — MONTELUKAST 4 MG/1
10 TABLET, CHEWABLE ORAL DAILY
Refills: 0 | Status: DISCONTINUED | OUTPATIENT
Start: 2024-02-15 | End: 2024-02-20

## 2024-02-15 RX ORDER — ENOXAPARIN SODIUM 100 MG/ML
40 INJECTION SUBCUTANEOUS EVERY 24 HOURS
Refills: 0 | Status: DISCONTINUED | OUTPATIENT
Start: 2024-02-15 | End: 2024-02-20

## 2024-02-15 RX ORDER — ACETAMINOPHEN 500 MG
650 TABLET ORAL EVERY 6 HOURS
Refills: 0 | Status: DISCONTINUED | OUTPATIENT
Start: 2024-02-15 | End: 2024-02-18

## 2024-02-15 RX ORDER — PANTOPRAZOLE SODIUM 20 MG/1
40 TABLET, DELAYED RELEASE ORAL
Refills: 0 | Status: DISCONTINUED | OUTPATIENT
Start: 2024-02-15 | End: 2024-02-20

## 2024-02-15 RX ORDER — MAGNESIUM SULFATE 500 MG/ML
2 VIAL (ML) INJECTION ONCE
Refills: 0 | Status: COMPLETED | OUTPATIENT
Start: 2024-02-15 | End: 2024-02-15

## 2024-02-15 RX ORDER — AZITHROMYCIN 500 MG/1
500 TABLET, FILM COATED ORAL EVERY 24 HOURS
Refills: 0 | Status: DISCONTINUED | OUTPATIENT
Start: 2024-02-15 | End: 2024-02-15

## 2024-02-15 RX ORDER — SODIUM CHLORIDE 9 MG/ML
1000 INJECTION, SOLUTION INTRAVENOUS ONCE
Refills: 0 | Status: COMPLETED | OUTPATIENT
Start: 2024-02-15 | End: 2024-02-15

## 2024-02-15 RX ORDER — ALBUTEROL 90 UG/1
2 AEROSOL, METERED ORAL EVERY 4 HOURS
Refills: 0 | Status: DISCONTINUED | OUTPATIENT
Start: 2024-02-15 | End: 2024-02-15

## 2024-02-15 RX ORDER — ALBUTEROL 90 UG/1
2.5 AEROSOL, METERED ORAL
Refills: 0 | Status: DISCONTINUED | OUTPATIENT
Start: 2024-02-15 | End: 2024-02-20

## 2024-02-15 RX ORDER — IPRATROPIUM/ALBUTEROL SULFATE 18-103MCG
3 AEROSOL WITH ADAPTER (GRAM) INHALATION
Refills: 0 | Status: DISCONTINUED | OUTPATIENT
Start: 2024-02-15 | End: 2024-02-20

## 2024-02-15 RX ORDER — TIOTROPIUM BROMIDE 18 UG/1
2 CAPSULE ORAL; RESPIRATORY (INHALATION) DAILY
Refills: 0 | Status: DISCONTINUED | OUTPATIENT
Start: 2024-02-15 | End: 2024-02-17

## 2024-02-15 RX ORDER — ACETAMINOPHEN 500 MG
975 TABLET ORAL ONCE
Refills: 0 | Status: COMPLETED | OUTPATIENT
Start: 2024-02-15 | End: 2024-02-15

## 2024-02-15 RX ADMIN — Medication 975 MILLIGRAM(S): at 11:05

## 2024-02-15 RX ADMIN — Medication 75 MILLIGRAM(S): at 20:45

## 2024-02-15 RX ADMIN — Medication 3 MILLILITER(S): at 20:45

## 2024-02-15 RX ADMIN — Medication 150 GRAM(S): at 11:04

## 2024-02-15 RX ADMIN — SODIUM CHLORIDE 1000 MILLILITER(S): 9 INJECTION, SOLUTION INTRAVENOUS at 11:04

## 2024-02-15 RX ADMIN — Medication 3 MILLILITER(S): at 11:04

## 2024-02-15 RX ADMIN — BUDESONIDE AND FORMOTEROL FUMARATE DIHYDRATE 2 PUFF(S): 160; 4.5 AEROSOL RESPIRATORY (INHALATION) at 20:45

## 2024-02-15 RX ADMIN — AZITHROMYCIN 255 MILLIGRAM(S): 500 TABLET, FILM COATED ORAL at 17:25

## 2024-02-15 NOTE — ED PROVIDER NOTE - NSICDXPASTMEDICALHX_GEN_ALL_CORE_FT
--speech continues to recommend NPO  --currently receiving TFs and plan is for possible PEG     PAST MEDICAL HISTORY:  Anemia     COPD (chronic obstructive pulmonary disease)     GERD (gastroesophageal reflux disease)     Hyperthyroidism     Umbilical hernia

## 2024-02-15 NOTE — ED PROVIDER NOTE - ATTENDING CONTRIBUTION TO CARE
52 y/o F 52 y/o F Hyperthyroidism, GERD, COPD (not on home oxygen), anemia complaining of 2 days of productive cough and shortness of breath progressively worsening.  Positive subjective fevers.  No relief at home with inhalers and nebs.  No chest pain.  No nausea, vomiting or diarrhea.  No abdominal pain.  No hemoptysis.  Patient given DuoNebs and Decadron IM by EMS en route.  Vitals noted.  CONSTITUTIONAL: Well-appearing; well-nourished; in no apparent distress. +tactile fever.   HEAD: Normocephalic; atraumatic.   EYES: PERRL; EOM intact. Conjunctiva normal B/L.   ENT: Normal pharynx with no tonsillar hypertrophy. MMM.  NECK: Supple; non-tender; no cervical lymphadenopathy.   CHEST: Normal chest excursion with respiration.   CARDIOVASCULAR: Tachycardia S1, S2; no murmurs, rubs, or gallops.   RESPIRATORY: B/L rhonchi and wheezing.   GI/: Normal bowel sounds; non-distended; non-tender.  BACK: No evidence of trauma or deformity. Non-tender to palpation. No CVA tenderness.   EXT: Normal ROM in all four extremities; non-tender to palpation; distal pulses are normal. No leg edema B/L.   SKIN: Normal for age and race; warm; dry; good turgor.  NEURO: A & O x 4; CN 2-12 intact. Grossly unremarkable.

## 2024-02-15 NOTE — H&P ADULT - ATTENDING COMMENTS
53 year old female with PMH of COPD (not on home oxygen), HTN, and Hypothyroidism presenting for shortness of breath.   Patient was seen and examined in the emergency room  Tested positive for flu A.  Start Tamiflu.  Supportive measures.  DuoNebs.  If needed can start steroids.  DC azithromycin.   Continue with home meds  Rest as above  Discussed with the patient's in the ER

## 2024-02-15 NOTE — H&P ADULT - NSHPLABSRESULTS_GEN_ALL_CORE
LABS:                          13.7   6.88  )-----------( 242      ( 15 Feb 2024 11:02 )             39.3     02-15    139  |  101  |  7<L>  ----------------------------<  125<H>  4.1   |  21  |  0.7    Ca    9.4      15 Feb 2024 11:02    TPro  8.1<H>  /  Alb  4.7  /  TBili  0.8  /  DBili  x   /  AST  31  /  ALT  23  /  AlkPhos  80  02-15

## 2024-02-15 NOTE — H&P ADULT - NSHPPHYSICALEXAM_GEN_ALL_CORE
T(C): 37.1 (02-15-24 @ 15:25), Max: 38.8 (02-15-24 @ 10:40)  HR: 117 (02-15-24 @ 15:25) (117 - 126)  BP: 127/81 (02-15-24 @ 15:25) (127/75 - 155/87)  RR: 20 (02-15-24 @ 15:29) (19 - 22)  SpO2: 94% (02-15-24 @ 15:29) (88% - 97%)    CONSTITUTIONAL: No apparent distress  EYES: PERRLA and symmetric, EOMI, No conjunctival or scleral injection, non-icteric  ENMT: Oral mucosa with moist membranes; no pharyngeal injection or exudates  NECK: Supple, symmetric and without tracheal deviation   RESP: No respiratory distress, no wheezing, marked decrease in breath sound  CV: RRR, +S1S2, no peripheral edema  GI: Soft, NT, ND, no rebound, no guarding;

## 2024-02-15 NOTE — ED PROVIDER NOTE - PHYSICAL EXAMINATION
VITAL SIGNS: I have reviewed nursing notes and confirm.  CONSTITUTIONAL: Non-toxic, in NAD  SKIN: Warm dry, normal skin turgor  HEAD: NCAT  EYES: No conjunctival injection, scleral anicteric  ENT: Moist mucous membranes, normal pharynx with no erythema or exudates  NECK: Supple; full ROM. Nontender. No cervical LAD  CARD: tachycardic, no murmurs, rubs or gallops  RESP: Diminished bilaterally with expiratory wheezing noted;  ABD: Soft, non-distended, non-tender, no rebound or guarding. No CVA tenderness  EXT: Full ROM, no bony tenderness, no pedal edema, no calf tenderness  NEURO: Normal motor, normal sensory. CN II-XII grossly intact. Cerebellar testing normal. Normal gait.  PSYCH: Cooperative, appropriate.

## 2024-02-15 NOTE — ED PROVIDER NOTE - CLINICAL SUMMARY MEDICAL DECISION MAKING FREE TEXT BOX
53-year-old female past medical history as documented presents to the ED with 2 days of cough and shortness of breath.  Patient with acute COPD exacerbation.  Patient treated clinically in the ED for COPD exacerbation.  Chest x-ray with no acute pathology.  EKG with sinus tachycardia and new ST depressions inferolateral leads.  All labs reviewed.  Patient admitted to medicine for further treatment and evaluation.

## 2024-02-15 NOTE — H&P ADULT - HISTORY OF PRESENT ILLNESS
53 year old female with PMH of COPD (not on home oxygen), HTN, and Hypothyroidism presenting for shortness of breath. Patient endorses worsening non-exertional shortness of breath, associated with a non-productive cough, nasal congestion, subjective fevers, and chest tightness since 2 days.   + GI upset and diarrhea 2 weeks ago (resolved)  no increase in sputum production or purulence. + sick contacts (her daughter).    she received her annual immunizations.    Patient was given Duonebs x1 and Decadron 12mg by EMS prior to arrival without improvement. Patient denies recent travels, history of DVT/PEs, nausea, vomiting, abdominal pain, back pain, constipation, rash, calf pain/tenderness/swelling,.    Patient admitted for COPD exacerbation

## 2024-02-15 NOTE — ED PROVIDER NOTE - OBJECTIVE STATEMENT
Patient is a 53 year old female with PMH of COPD (not on home oxygen), HTN, and Hypothyroidism presenting for shortness of breath. Patient endorses worsening non-exertional shortness of breath, associated with a non-productive cough, nasal congestion, subjective fevers, and chest wall discomfort x2 days. Patient was given Duonebs x1 and Decadron 12mg by EMS prior to arrival without improvement. Patient denies recent travels, history of DVT/PEs, nausea, vomiting, abdominal pain, back pain, diarrhea, constipation, rash, calf pain/tenderness/swelling, or recent surgeries.

## 2024-02-16 LAB
ANION GAP SERPL CALC-SCNC: 15 MMOL/L — HIGH (ref 7–14)
BASOPHILS # BLD AUTO: 0.01 K/UL — SIGNIFICANT CHANGE UP (ref 0–0.2)
BASOPHILS NFR BLD AUTO: 0.2 % — SIGNIFICANT CHANGE UP (ref 0–1)
BUN SERPL-MCNC: 7 MG/DL — LOW (ref 10–20)
CALCIUM SERPL-MCNC: 9 MG/DL — SIGNIFICANT CHANGE UP (ref 8.4–10.5)
CHLORIDE SERPL-SCNC: 100 MMOL/L — SIGNIFICANT CHANGE UP (ref 98–110)
CO2 SERPL-SCNC: 23 MMOL/L — SIGNIFICANT CHANGE UP (ref 17–32)
CREAT SERPL-MCNC: 0.6 MG/DL — LOW (ref 0.7–1.5)
EGFR: 106 ML/MIN/1.73M2 — SIGNIFICANT CHANGE UP
EOSINOPHIL # BLD AUTO: 0 K/UL — SIGNIFICANT CHANGE UP (ref 0–0.7)
EOSINOPHIL NFR BLD AUTO: 0 % — SIGNIFICANT CHANGE UP (ref 0–8)
GLUCOSE SERPL-MCNC: 145 MG/DL — HIGH (ref 70–99)
HCT VFR BLD CALC: 36.2 % — LOW (ref 37–47)
HGB BLD-MCNC: 12.4 G/DL — SIGNIFICANT CHANGE UP (ref 12–16)
IMM GRANULOCYTES NFR BLD AUTO: 0 % — LOW (ref 0.1–0.3)
LYMPHOCYTES # BLD AUTO: 0.67 K/UL — LOW (ref 1.2–3.4)
LYMPHOCYTES # BLD AUTO: 15.5 % — LOW (ref 20.5–51.1)
MAGNESIUM SERPL-MCNC: 2 MG/DL — SIGNIFICANT CHANGE UP (ref 1.8–2.4)
MCHC RBC-ENTMCNC: 32.8 PG — HIGH (ref 27–31)
MCHC RBC-ENTMCNC: 34.3 G/DL — SIGNIFICANT CHANGE UP (ref 32–37)
MCV RBC AUTO: 95.8 FL — SIGNIFICANT CHANGE UP (ref 81–99)
MONOCYTES # BLD AUTO: 0.45 K/UL — SIGNIFICANT CHANGE UP (ref 0.1–0.6)
MONOCYTES NFR BLD AUTO: 10.4 % — HIGH (ref 1.7–9.3)
NEUTROPHILS # BLD AUTO: 3.2 K/UL — SIGNIFICANT CHANGE UP (ref 1.4–6.5)
NEUTROPHILS NFR BLD AUTO: 73.9 % — SIGNIFICANT CHANGE UP (ref 42.2–75.2)
NRBC # BLD: 0 /100 WBCS — SIGNIFICANT CHANGE UP (ref 0–0)
PLATELET # BLD AUTO: 209 K/UL — SIGNIFICANT CHANGE UP (ref 130–400)
PMV BLD: 10.2 FL — SIGNIFICANT CHANGE UP (ref 7.4–10.4)
POTASSIUM SERPL-MCNC: 3.8 MMOL/L — SIGNIFICANT CHANGE UP (ref 3.5–5)
POTASSIUM SERPL-SCNC: 3.8 MMOL/L — SIGNIFICANT CHANGE UP (ref 3.5–5)
PROCALCITONIN SERPL-MCNC: 0.09 NG/ML — SIGNIFICANT CHANGE UP (ref 0.02–0.1)
RBC # BLD: 3.78 M/UL — LOW (ref 4.2–5.4)
RBC # FLD: 13.5 % — SIGNIFICANT CHANGE UP (ref 11.5–14.5)
SODIUM SERPL-SCNC: 138 MMOL/L — SIGNIFICANT CHANGE UP (ref 135–146)
WBC # BLD: 4.33 K/UL — LOW (ref 4.8–10.8)
WBC # FLD AUTO: 4.33 K/UL — LOW (ref 4.8–10.8)

## 2024-02-16 PROCEDURE — 99232 SBSQ HOSP IP/OBS MODERATE 35: CPT

## 2024-02-16 RX ORDER — SODIUM CHLORIDE 9 MG/ML
1000 INJECTION INTRAMUSCULAR; INTRAVENOUS; SUBCUTANEOUS
Refills: 0 | Status: DISCONTINUED | OUTPATIENT
Start: 2024-02-16 | End: 2024-02-17

## 2024-02-16 RX ADMIN — Medication 3 MILLILITER(S): at 08:44

## 2024-02-16 RX ADMIN — Medication 75 MILLIGRAM(S): at 17:48

## 2024-02-16 RX ADMIN — MONTELUKAST 10 MILLIGRAM(S): 4 TABLET, CHEWABLE ORAL at 12:50

## 2024-02-16 RX ADMIN — Medication 75 MILLIGRAM(S): at 05:36

## 2024-02-16 RX ADMIN — PANTOPRAZOLE SODIUM 40 MILLIGRAM(S): 20 TABLET, DELAYED RELEASE ORAL at 10:28

## 2024-02-16 RX ADMIN — Medication 3 MILLILITER(S): at 20:36

## 2024-02-16 RX ADMIN — Medication 3 MILLILITER(S): at 13:51

## 2024-02-16 RX ADMIN — Medication 3 MILLILITER(S): at 02:00

## 2024-02-16 RX ADMIN — TIOTROPIUM BROMIDE 2 PUFF(S): 18 CAPSULE ORAL; RESPIRATORY (INHALATION) at 08:45

## 2024-02-16 RX ADMIN — Medication 40 MILLIGRAM(S): at 10:53

## 2024-02-16 RX ADMIN — BUDESONIDE AND FORMOTEROL FUMARATE DIHYDRATE 2 PUFF(S): 160; 4.5 AEROSOL RESPIRATORY (INHALATION) at 08:45

## 2024-02-16 RX ADMIN — BUDESONIDE AND FORMOTEROL FUMARATE DIHYDRATE 2 PUFF(S): 160; 4.5 AEROSOL RESPIRATORY (INHALATION) at 20:36

## 2024-02-16 RX ADMIN — ENOXAPARIN SODIUM 40 MILLIGRAM(S): 100 INJECTION SUBCUTANEOUS at 13:50

## 2024-02-16 NOTE — PROGRESS NOTE ADULT - ASSESSMENT
53 year old female with PMH of COPD (not on home oxygen), HTN, and Hyperthyroidism presenting for shortness of breath. Patient endorses worsening non-exertional shortness of breath.    #Acute Hypoxic Respiratory Failure  #COPD Exacerbation  #Sepsis, POA (Febrile, tachycardic)  #Influenza +  On 3L NC saturating 96%  CXR reviewed and interpreted, no acute infiltrates  D-Dimer wnl  - IV solumedrol 40 x1 today, then PO prednisone 40mg x4d  - Cont tamiflu  - Duonebs q6h  - Cont symbicort/tiotropium (uses trelegy at home)  - Wean off O2    #Hyperthyroidism  - Cont methimazole 5mg qD  - f/u TSH    DVT PPX, Lovenox    #Progress Note Handoff  Pending (specify): Cont steroids, duonebs, tamiflu, wean O2  Family discussion: elizabeth pt regarding tx for flu and COPD  Disposition: Home
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
## 2024-02-16 NOTE — PATIENT PROFILE ADULT - FALL HARM RISK - HARM RISK INTERVENTIONS
normal... Assistance with ambulation/Assistance OOB with selected safe patient handling equipment/Communicate Risk of Fall with Harm to all staff/Reinforce activity limits and safety measures with patient and family/Tailored Fall Risk Interventions/Visual Cue: Yellow wristband and red socks/Bed in lowest position, wheels locked, appropriate side rails in place/Call bell, personal items and telephone in reach/Instruct patient to call for assistance before getting out of bed or chair/Non-slip footwear when patient is out of bed/[place] to call system/Physically safe environment - no spills, clutter or unnecessary equipment/Purposeful Proactive Rounding/Room/bathroom lighting operational, light cord in reach

## 2024-02-17 ENCOUNTER — TRANSCRIPTION ENCOUNTER (OUTPATIENT)
Age: 54
End: 2024-02-17

## 2024-02-17 DIAGNOSIS — Z79.899 OTHER LONG TERM (CURRENT) DRUG THERAPY: ICD-10-CM

## 2024-02-17 DIAGNOSIS — Z86.39 PERSONAL HISTORY OF OTHER ENDOCRINE, NUTRITIONAL AND METABOLIC DISEASE: ICD-10-CM

## 2024-02-17 DIAGNOSIS — E03.9 HYPOTHYROIDISM, UNSPECIFIED: ICD-10-CM

## 2024-02-17 DIAGNOSIS — A41.9 SEPSIS, UNSPECIFIED ORGANISM: ICD-10-CM

## 2024-02-17 DIAGNOSIS — I10 ESSENTIAL (PRIMARY) HYPERTENSION: ICD-10-CM

## 2024-02-17 LAB
ALBUMIN SERPL ELPH-MCNC: 4.4 G/DL — SIGNIFICANT CHANGE UP (ref 3.5–5.2)
ALP SERPL-CCNC: 70 U/L — SIGNIFICANT CHANGE UP (ref 30–115)
ALT FLD-CCNC: 31 U/L — SIGNIFICANT CHANGE UP (ref 0–41)
ANION GAP SERPL CALC-SCNC: 16 MMOL/L — HIGH (ref 7–14)
AST SERPL-CCNC: 56 U/L — HIGH (ref 0–41)
BASOPHILS # BLD AUTO: 0.02 K/UL — SIGNIFICANT CHANGE UP (ref 0–0.2)
BASOPHILS NFR BLD AUTO: 0.4 % — SIGNIFICANT CHANGE UP (ref 0–1)
BILIRUB SERPL-MCNC: 0.5 MG/DL — SIGNIFICANT CHANGE UP (ref 0.2–1.2)
BUN SERPL-MCNC: 9 MG/DL — LOW (ref 10–20)
CALCIUM SERPL-MCNC: 9.5 MG/DL — SIGNIFICANT CHANGE UP (ref 8.4–10.5)
CHLORIDE SERPL-SCNC: 98 MMOL/L — SIGNIFICANT CHANGE UP (ref 98–110)
CO2 SERPL-SCNC: 25 MMOL/L — SIGNIFICANT CHANGE UP (ref 17–32)
CREAT SERPL-MCNC: 0.8 MG/DL — SIGNIFICANT CHANGE UP (ref 0.7–1.5)
EGFR: 87 ML/MIN/1.73M2 — SIGNIFICANT CHANGE UP
EOSINOPHIL # BLD AUTO: 0.01 K/UL — SIGNIFICANT CHANGE UP (ref 0–0.7)
EOSINOPHIL NFR BLD AUTO: 0.2 % — SIGNIFICANT CHANGE UP (ref 0–8)
GLUCOSE SERPL-MCNC: 161 MG/DL — HIGH (ref 70–99)
HCT VFR BLD CALC: 42.4 % — SIGNIFICANT CHANGE UP (ref 37–47)
HGB BLD-MCNC: 14.1 G/DL — SIGNIFICANT CHANGE UP (ref 12–16)
IMM GRANULOCYTES NFR BLD AUTO: 0.4 % — HIGH (ref 0.1–0.3)
LYMPHOCYTES # BLD AUTO: 0.93 K/UL — LOW (ref 1.2–3.4)
LYMPHOCYTES # BLD AUTO: 16.6 % — LOW (ref 20.5–51.1)
MCHC RBC-ENTMCNC: 32.6 PG — HIGH (ref 27–31)
MCHC RBC-ENTMCNC: 33.3 G/DL — SIGNIFICANT CHANGE UP (ref 32–37)
MCV RBC AUTO: 98.1 FL — SIGNIFICANT CHANGE UP (ref 81–99)
MONOCYTES # BLD AUTO: 0.24 K/UL — SIGNIFICANT CHANGE UP (ref 0.1–0.6)
MONOCYTES NFR BLD AUTO: 4.3 % — SIGNIFICANT CHANGE UP (ref 1.7–9.3)
NEUTROPHILS # BLD AUTO: 4.38 K/UL — SIGNIFICANT CHANGE UP (ref 1.4–6.5)
NEUTROPHILS NFR BLD AUTO: 78.1 % — HIGH (ref 42.2–75.2)
NRBC # BLD: 0 /100 WBCS — SIGNIFICANT CHANGE UP (ref 0–0)
PLATELET # BLD AUTO: 227 K/UL — SIGNIFICANT CHANGE UP (ref 130–400)
PMV BLD: 9.9 FL — SIGNIFICANT CHANGE UP (ref 7.4–10.4)
POTASSIUM SERPL-MCNC: 4.1 MMOL/L — SIGNIFICANT CHANGE UP (ref 3.5–5)
POTASSIUM SERPL-SCNC: 4.1 MMOL/L — SIGNIFICANT CHANGE UP (ref 3.5–5)
PROT SERPL-MCNC: 7.9 G/DL — SIGNIFICANT CHANGE UP (ref 6–8)
RBC # BLD: 4.32 M/UL — SIGNIFICANT CHANGE UP (ref 4.2–5.4)
RBC # FLD: 13.2 % — SIGNIFICANT CHANGE UP (ref 11.5–14.5)
SODIUM SERPL-SCNC: 139 MMOL/L — SIGNIFICANT CHANGE UP (ref 135–146)
TSH SERPL-MCNC: 4.29 UIU/ML — HIGH (ref 0.27–4.2)
WBC # BLD: 5.6 K/UL — SIGNIFICANT CHANGE UP (ref 4.8–10.8)
WBC # FLD AUTO: 5.6 K/UL — SIGNIFICANT CHANGE UP (ref 4.8–10.8)

## 2024-02-17 PROCEDURE — 99232 SBSQ HOSP IP/OBS MODERATE 35: CPT

## 2024-02-17 RX ORDER — KETOROLAC TROMETHAMINE 30 MG/ML
15 SYRINGE (ML) INJECTION ONCE
Refills: 0 | Status: DISCONTINUED | OUTPATIENT
Start: 2024-02-17 | End: 2024-02-17

## 2024-02-17 RX ORDER — MORPHINE SULFATE 50 MG/1
1 CAPSULE, EXTENDED RELEASE ORAL EVERY 4 HOURS
Refills: 0 | Status: DISCONTINUED | OUTPATIENT
Start: 2024-02-17 | End: 2024-02-20

## 2024-02-17 RX ADMIN — BUDESONIDE AND FORMOTEROL FUMARATE DIHYDRATE 2 PUFF(S): 160; 4.5 AEROSOL RESPIRATORY (INHALATION) at 20:39

## 2024-02-17 RX ADMIN — TIOTROPIUM BROMIDE 2 PUFF(S): 18 CAPSULE ORAL; RESPIRATORY (INHALATION) at 09:47

## 2024-02-17 RX ADMIN — BUDESONIDE AND FORMOTEROL FUMARATE DIHYDRATE 2 PUFF(S): 160; 4.5 AEROSOL RESPIRATORY (INHALATION) at 09:47

## 2024-02-17 RX ADMIN — Medication 3 MILLILITER(S): at 09:46

## 2024-02-17 RX ADMIN — Medication 15 MILLIGRAM(S): at 11:00

## 2024-02-17 RX ADMIN — ENOXAPARIN SODIUM 40 MILLIGRAM(S): 100 INJECTION SUBCUTANEOUS at 17:56

## 2024-02-17 RX ADMIN — MORPHINE SULFATE 1 MILLIGRAM(S): 50 CAPSULE, EXTENDED RELEASE ORAL at 23:21

## 2024-02-17 RX ADMIN — Medication 75 MILLIGRAM(S): at 05:12

## 2024-02-17 RX ADMIN — Medication 75 MILLIGRAM(S): at 17:56

## 2024-02-17 RX ADMIN — PANTOPRAZOLE SODIUM 40 MILLIGRAM(S): 20 TABLET, DELAYED RELEASE ORAL at 05:13

## 2024-02-17 RX ADMIN — Medication 40 MILLIGRAM(S): at 05:13

## 2024-02-17 RX ADMIN — Medication 3 MILLILITER(S): at 14:50

## 2024-02-17 RX ADMIN — Medication 3 MILLILITER(S): at 20:37

## 2024-02-17 RX ADMIN — MONTELUKAST 10 MILLIGRAM(S): 4 TABLET, CHEWABLE ORAL at 11:01

## 2024-02-17 NOTE — DISCHARGE NOTE NURSING/CASE MANAGEMENT/SOCIAL WORK - NSFLUVACAGEDISCH_IMM_ALL_CORE
[facility] is calling regarding Provider not approved by the state for the glipiZIDE (GLUCOTROL) 5 MG tablet.     She stated a NEW prescription must be entered by Attending.      Patient Name: Catrachita Torrse  Caller Name: Female Rep  Name of Facility: [facility]  Callback Number: [phone]  Did you confirm the message with the caller?: yes    Thank you,  Braeden Cerda   Adult

## 2024-02-17 NOTE — PROGRESS NOTE ADULT - ASSESSMENT
53F PMHx COPD, HTN, hyperthyroidism, here with sepsis, present on admission, acute hypoxic resp failure; found to have influenza a.

## 2024-02-17 NOTE — DISCHARGE NOTE NURSING/CASE MANAGEMENT/SOCIAL WORK - PATIENT PORTAL LINK FT
You can access the FollowMyHealth Patient Portal offered by [facility] by registering at the following website: [email or web address]. By joining [facility] FollowMyHealth portal, you will also be able to view your health information using other applications (apps) compatible with our system.

## 2024-02-17 NOTE — DISCHARGE NOTE NURSING/CASE MANAGEMENT/SOCIAL WORK - NSDCPEFALRISK_GEN_ALL_CORE
For information on Fall & Injury Prevention, visit: [email or web address] OR  [email or web address] OR  https://www.cdc.gov/steadi/patient.html

## 2024-02-18 DIAGNOSIS — R10.9 UNSPECIFIED ABDOMINAL PAIN: ICD-10-CM

## 2024-02-18 LAB
ALBUMIN SERPL ELPH-MCNC: 4.1 G/DL — SIGNIFICANT CHANGE UP (ref 3.5–5.2)
ALP SERPL-CCNC: 59 U/L — SIGNIFICANT CHANGE UP (ref 30–115)
ALT FLD-CCNC: 33 U/L — SIGNIFICANT CHANGE UP (ref 0–41)
ANION GAP SERPL CALC-SCNC: 13 MMOL/L — SIGNIFICANT CHANGE UP (ref 7–14)
AST SERPL-CCNC: 49 U/L — HIGH (ref 0–41)
BASOPHILS # BLD AUTO: 0.02 K/UL — SIGNIFICANT CHANGE UP (ref 0–0.2)
BASOPHILS NFR BLD AUTO: 0.4 % — SIGNIFICANT CHANGE UP (ref 0–1)
BILIRUB SERPL-MCNC: 0.4 MG/DL — SIGNIFICANT CHANGE UP (ref 0.2–1.2)
BUN SERPL-MCNC: 11 MG/DL — SIGNIFICANT CHANGE UP (ref 10–20)
CALCIUM SERPL-MCNC: 9.1 MG/DL — SIGNIFICANT CHANGE UP (ref 8.4–10.4)
CHLORIDE SERPL-SCNC: 99 MMOL/L — SIGNIFICANT CHANGE UP (ref 98–110)
CO2 SERPL-SCNC: 25 MMOL/L — SIGNIFICANT CHANGE UP (ref 17–32)
CREAT SERPL-MCNC: 0.6 MG/DL — LOW (ref 0.7–1.5)
EGFR: 106 ML/MIN/1.73M2 — SIGNIFICANT CHANGE UP
EOSINOPHIL # BLD AUTO: 0.02 K/UL — SIGNIFICANT CHANGE UP (ref 0–0.7)
EOSINOPHIL NFR BLD AUTO: 0.4 % — SIGNIFICANT CHANGE UP (ref 0–8)
GLUCOSE SERPL-MCNC: 109 MG/DL — HIGH (ref 70–99)
HCT VFR BLD CALC: 37 % — SIGNIFICANT CHANGE UP (ref 37–47)
HGB BLD-MCNC: 12.7 G/DL — SIGNIFICANT CHANGE UP (ref 12–16)
IMM GRANULOCYTES NFR BLD AUTO: 0.2 % — SIGNIFICANT CHANGE UP (ref 0.1–0.3)
LYMPHOCYTES # BLD AUTO: 1.09 K/UL — LOW (ref 1.2–3.4)
LYMPHOCYTES # BLD AUTO: 24 % — SIGNIFICANT CHANGE UP (ref 20.5–51.1)
MAGNESIUM SERPL-MCNC: 1.7 MG/DL — LOW (ref 1.8–2.4)
MCHC RBC-ENTMCNC: 32.7 PG — HIGH (ref 27–31)
MCHC RBC-ENTMCNC: 34.3 G/DL — SIGNIFICANT CHANGE UP (ref 32–37)
MCV RBC AUTO: 95.4 FL — SIGNIFICANT CHANGE UP (ref 81–99)
MONOCYTES # BLD AUTO: 0.34 K/UL — SIGNIFICANT CHANGE UP (ref 0.1–0.6)
MONOCYTES NFR BLD AUTO: 7.5 % — SIGNIFICANT CHANGE UP (ref 1.7–9.3)
NEUTROPHILS # BLD AUTO: 3.07 K/UL — SIGNIFICANT CHANGE UP (ref 1.4–6.5)
NEUTROPHILS NFR BLD AUTO: 67.5 % — SIGNIFICANT CHANGE UP (ref 42.2–75.2)
NRBC # BLD: 0 /100 WBCS — SIGNIFICANT CHANGE UP (ref 0–0)
PLATELET # BLD AUTO: 238 K/UL — SIGNIFICANT CHANGE UP (ref 130–400)
PMV BLD: 10.2 FL — SIGNIFICANT CHANGE UP (ref 7.4–10.4)
POTASSIUM SERPL-MCNC: 3.7 MMOL/L — SIGNIFICANT CHANGE UP (ref 3.5–5)
POTASSIUM SERPL-SCNC: 3.7 MMOL/L — SIGNIFICANT CHANGE UP (ref 3.5–5)
PROT SERPL-MCNC: 7.1 G/DL — SIGNIFICANT CHANGE UP (ref 6–8)
RBC # BLD: 3.88 M/UL — LOW (ref 4.2–5.4)
RBC # FLD: 13 % — SIGNIFICANT CHANGE UP (ref 11.5–14.5)
SODIUM SERPL-SCNC: 137 MMOL/L — SIGNIFICANT CHANGE UP (ref 135–146)
WBC # BLD: 4.55 K/UL — LOW (ref 4.8–10.8)
WBC # FLD AUTO: 4.55 K/UL — LOW (ref 4.8–10.8)

## 2024-02-18 PROCEDURE — 99232 SBSQ HOSP IP/OBS MODERATE 35: CPT

## 2024-02-18 RX ORDER — MAGNESIUM SULFATE 500 MG/ML
2 VIAL (ML) INJECTION ONCE
Refills: 0 | Status: DISCONTINUED | OUTPATIENT
Start: 2024-02-18 | End: 2024-02-20

## 2024-02-18 RX ORDER — ACETAMINOPHEN 500 MG
650 TABLET ORAL EVERY 6 HOURS
Refills: 0 | Status: DISCONTINUED | OUTPATIENT
Start: 2024-02-18 | End: 2024-02-20

## 2024-02-18 RX ADMIN — ENOXAPARIN SODIUM 40 MILLIGRAM(S): 100 INJECTION SUBCUTANEOUS at 13:24

## 2024-02-18 RX ADMIN — Medication 75 MILLIGRAM(S): at 05:08

## 2024-02-18 RX ADMIN — Medication 40 MILLIGRAM(S): at 05:07

## 2024-02-18 RX ADMIN — Medication 3 MILLILITER(S): at 09:13

## 2024-02-18 RX ADMIN — Medication 3 MILLILITER(S): at 13:24

## 2024-02-18 RX ADMIN — BUDESONIDE AND FORMOTEROL FUMARATE DIHYDRATE 2 PUFF(S): 160; 4.5 AEROSOL RESPIRATORY (INHALATION) at 09:14

## 2024-02-18 RX ADMIN — MONTELUKAST 10 MILLIGRAM(S): 4 TABLET, CHEWABLE ORAL at 11:36

## 2024-02-18 RX ADMIN — Medication 75 MILLIGRAM(S): at 17:28

## 2024-02-18 RX ADMIN — PANTOPRAZOLE SODIUM 40 MILLIGRAM(S): 20 TABLET, DELAYED RELEASE ORAL at 05:08

## 2024-02-18 RX ADMIN — Medication 650 MILLIGRAM(S): at 20:48

## 2024-02-18 NOTE — PHYSICAL THERAPY INITIAL EVALUATION ADULT - GENERAL OBSERVATIONS, REHAB EVAL
7:53-8:17am Pt is a 52 y/o, female, A & O x 3. Pt observed indep in functional mobility and ambulation > 200 ft without AD with no loss of balance noted. Pt reports pain 9/10 to abdominal area due to hernia stated pain meds doesn't work and demanding surgery, RN Mercedes made aware. No skilled PT indicated secondary to pt is indep in mobility and stairs is not a barrier for DC.

## 2024-02-18 NOTE — PROGRESS NOTE ADULT - NSPROGADDITIONALINFOA_GEN_ALL_CORE
#Progress Note Handoff  Pending (specify): PT, d/c planning  Family discussion: d/w pt at bedside  Disposition: home
#Progress Note Handoff  Pending (specify): PT, surgery eval for hernia  Family discussion: d/w pt at bedside  Disposition: home

## 2024-02-18 NOTE — PHYSICAL THERAPY INITIAL EVALUATION ADULT - PERTINENT HX OF CURRENT PROBLEM, REHAB EVAL
53 year old female with PMH of COPD (not on home oxygen), HTN, and Hypothyroidism presenting for shortness of breath. Patient endorses worsening non-exertional shortness of breath.

## 2024-02-18 NOTE — PROGRESS NOTE ADULT - PROBLEM SELECTOR PLAN 2
outpt f/u
with acute hypoxic resp failure  due to influenza a; copd exacerbation  cxr clear  tamiflu  prednisone 40  bcx ntd  no hypoxia, satting well on ra  duoneb q6, symbicort; singulair 10  PT

## 2024-02-18 NOTE — PROGRESS NOTE ADULT - PROBLEM SELECTOR PLAN 1
DISCHARGE INSTRUCTIONS     As part of your transition home, we are providing you with this set of discharge instructions, as a guide to help you in that process. In addition to the care provided during your hospital stay, there may be upcoming clinic visits, laboratory appointments, and/or results noted on this After Visit Summary (AVS).     To assist the physicians caring for you during this transition, we would like you remind you of the followin. Please call a Provider for help if you experience any of the following: Any questions or concerns  2. Activity Instructions: Normal activity as tolerated  3. Dressing/Wound Instructions: Not applicable  4. Lifting & Weight-bearing Instructions: No restrictions  5. Diet: Return to previous diet  6. Miscellaneous: follow up with your usual providers for refills on prescriptions provided to you this hospitalization.      If you have any questions 24-48 hours after discharge, please feel free to contact the hospital unit/department you were discharged from.       Nutrition for Wound Healing      Good nutrition is important for wound healing. Eating the right foods can support the healing process. The following nutrients play a major role in wound healing. If you are unable to eat the recommended foods, you may benefit from a multivitamin/mineral supplement.   **If you have Diabetes: High blood sugar can increase your risk of infection and slow wound healing. It is recommended to eat regular, balanced meals and avoid skipping meals; take diabetes medications and monitor your blood sugar as prescribed.     Eat Enough Calories   Calories provide energy for wound healing. Choosing a variety of foods will provide your body with the calories and nutrients it needs. During wound healing, it is helpful to weigh yourself every few days to determine if you are maintaining your weight.      Eat Protein at Each Meal   Adequate protein intake promotes wound healing since it is the 
building block for cells and tissues. Good sources of protein include:   --Poultry, tofu, dried peas, beans, lentils, beef, nuts, yogurt, eggs and egg whites, fish, milk, peanut butter, cheese, cottage cheese.       Eat Vitamin C Rich Foods Daily    Vitamin C assists in building new tissue for wound repair. Good sources of vitamin C include:  --Oranges/orange juice, pineapple, kiwi, strawberries, cantaloupe, tomatoes, broccoli, bell peppers, potatoes     Remember to Include Zinc    Zinc promotes protein and collagen formation during wound healing. While the best sources of zinc are from animal foods, good sources of zinc include:  --Fish, beef, spinach, dark meat of poultry, egg yolk, bran cereals, dried peas, beans, lentils     Drink Plenty of Fluids   Fluid is important for the normal functioning of cells. Drink 6 to 8 glasses of water, milk, tea, or other non-caffeinated liquids daily. If you have diabetes, limit/avoid juice and other sweetened drinks. Limit your intake of alcohol and caffeinated beverages      The following may be helpful if you have trouble eating due to nausea or poor appetite:   Eat smaller, more frequent meals   Try cold foods like fruits or sandwiches  Avoid highly seasoned or strong-flavored foods  Choose high calorie, high protein foods like puddings, peanut butter or meat sandwiches  Gradually try to increase the amount of food you eat at each meal  Eat solid foods first as liquids may fill you up before you begin eating  Drink liquids between meals instead of with meals  Avoid stuffy rooms or rooms with strong odors     If you become constipated:   Drink plenty of non-caffeinated beverages  Try heated liquids such as warmed prune juice, apple juice or hot tea  Gradually increase your fiber intake with whole grains, fruits & vegetables     What about nutritional supplements?   Talk with your dietitian or physician about whether or not a nutritional supplement will help you meet your 
nutrition needs      
with acute hypoxic resp failure  due to influenza a; copd exacerbation  cxr clear  tamiflu  prednisone 40  bcx ntd  no hypoxia, satting well on ra  duoneb q6, symbicort; singulair 10  PT, d/c planning
presumed due to hernia, in setting of cough  +umbilical hernia s/p repair 10 years prior  surgery eval  pain control  diet as tolerated

## 2024-02-19 ENCOUNTER — TRANSCRIPTION ENCOUNTER (OUTPATIENT)
Age: 54
End: 2024-02-19

## 2024-02-19 LAB
ALBUMIN SERPL ELPH-MCNC: 4.6 G/DL — SIGNIFICANT CHANGE UP (ref 3.5–5.2)
ALP SERPL-CCNC: 66 U/L — SIGNIFICANT CHANGE UP (ref 30–115)
ALT FLD-CCNC: 36 U/L — SIGNIFICANT CHANGE UP (ref 0–41)
ANION GAP SERPL CALC-SCNC: 15 MMOL/L — HIGH (ref 7–14)
AST SERPL-CCNC: 41 U/L — SIGNIFICANT CHANGE UP (ref 0–41)
BASOPHILS # BLD AUTO: 0.02 K/UL — SIGNIFICANT CHANGE UP (ref 0–0.2)
BASOPHILS NFR BLD AUTO: 0.4 % — SIGNIFICANT CHANGE UP (ref 0–1)
BILIRUB SERPL-MCNC: 0.6 MG/DL — SIGNIFICANT CHANGE UP (ref 0.2–1.2)
BUN SERPL-MCNC: 9 MG/DL — LOW (ref 10–20)
CALCIUM SERPL-MCNC: 9.6 MG/DL — SIGNIFICANT CHANGE UP (ref 8.4–10.5)
CHLORIDE SERPL-SCNC: 100 MMOL/L — SIGNIFICANT CHANGE UP (ref 98–110)
CO2 SERPL-SCNC: 27 MMOL/L — SIGNIFICANT CHANGE UP (ref 17–32)
CREAT SERPL-MCNC: 0.7 MG/DL — SIGNIFICANT CHANGE UP (ref 0.7–1.5)
EGFR: 102 ML/MIN/1.73M2 — SIGNIFICANT CHANGE UP
EOSINOPHIL # BLD AUTO: 0.01 K/UL — SIGNIFICANT CHANGE UP (ref 0–0.7)
EOSINOPHIL NFR BLD AUTO: 0.2 % — SIGNIFICANT CHANGE UP (ref 0–8)
GLUCOSE SERPL-MCNC: 137 MG/DL — HIGH (ref 70–99)
HCG UR QL: NEGATIVE — SIGNIFICANT CHANGE UP
HCT VFR BLD CALC: 40.9 % — SIGNIFICANT CHANGE UP (ref 37–47)
HGB BLD-MCNC: 13.9 G/DL — SIGNIFICANT CHANGE UP (ref 12–16)
IMM GRANULOCYTES NFR BLD AUTO: 0.2 % — SIGNIFICANT CHANGE UP (ref 0.1–0.3)
LYMPHOCYTES # BLD AUTO: 1.36 K/UL — SIGNIFICANT CHANGE UP (ref 1.2–3.4)
LYMPHOCYTES # BLD AUTO: 24.8 % — SIGNIFICANT CHANGE UP (ref 20.5–51.1)
MAGNESIUM SERPL-MCNC: 1.8 MG/DL — SIGNIFICANT CHANGE UP (ref 1.8–2.4)
MCHC RBC-ENTMCNC: 32.9 PG — HIGH (ref 27–31)
MCHC RBC-ENTMCNC: 34 G/DL — SIGNIFICANT CHANGE UP (ref 32–37)
MCV RBC AUTO: 96.7 FL — SIGNIFICANT CHANGE UP (ref 81–99)
MONOCYTES # BLD AUTO: 0.22 K/UL — SIGNIFICANT CHANGE UP (ref 0.1–0.6)
MONOCYTES NFR BLD AUTO: 4 % — SIGNIFICANT CHANGE UP (ref 1.7–9.3)
NEUTROPHILS # BLD AUTO: 3.87 K/UL — SIGNIFICANT CHANGE UP (ref 1.4–6.5)
NEUTROPHILS NFR BLD AUTO: 70.4 % — SIGNIFICANT CHANGE UP (ref 42.2–75.2)
NRBC # BLD: 0 /100 WBCS — SIGNIFICANT CHANGE UP (ref 0–0)
PLATELET # BLD AUTO: 277 K/UL — SIGNIFICANT CHANGE UP (ref 130–400)
PMV BLD: 9.6 FL — SIGNIFICANT CHANGE UP (ref 7.4–10.4)
POTASSIUM SERPL-MCNC: 4.2 MMOL/L — SIGNIFICANT CHANGE UP (ref 3.5–5)
POTASSIUM SERPL-SCNC: 4.2 MMOL/L — SIGNIFICANT CHANGE UP (ref 3.5–5)
PROT SERPL-MCNC: 8.2 G/DL — HIGH (ref 6–8)
RBC # BLD: 4.23 M/UL — SIGNIFICANT CHANGE UP (ref 4.2–5.4)
RBC # FLD: 13.1 % — SIGNIFICANT CHANGE UP (ref 11.5–14.5)
SODIUM SERPL-SCNC: 142 MMOL/L — SIGNIFICANT CHANGE UP (ref 135–146)
WBC # BLD: 5.49 K/UL — SIGNIFICANT CHANGE UP (ref 4.8–10.8)
WBC # FLD AUTO: 5.49 K/UL — SIGNIFICANT CHANGE UP (ref 4.8–10.8)

## 2024-02-19 PROCEDURE — 99254 IP/OBS CNSLTJ NEW/EST MOD 60: CPT

## 2024-02-19 PROCEDURE — 99232 SBSQ HOSP IP/OBS MODERATE 35: CPT

## 2024-02-19 RX ORDER — DIPHENHYDRAMINE HCL 50 MG
50 CAPSULE ORAL ONCE
Refills: 0 | Status: DISCONTINUED | OUTPATIENT
Start: 2024-02-19 | End: 2024-02-20

## 2024-02-19 RX ORDER — IOHEXOL 300 MG/ML
30 INJECTION, SOLUTION INTRAVENOUS ONCE
Refills: 0 | Status: COMPLETED | OUTPATIENT
Start: 2024-02-19 | End: 2024-02-19

## 2024-02-19 RX ORDER — SODIUM CHLORIDE 9 MG/ML
1000 INJECTION, SOLUTION INTRAVENOUS
Refills: 0 | Status: DISCONTINUED | OUTPATIENT
Start: 2024-02-19 | End: 2024-02-20

## 2024-02-19 RX ORDER — DIPHENHYDRAMINE HCL 50 MG
25 CAPSULE ORAL EVERY 4 HOURS
Refills: 0 | Status: DISCONTINUED | OUTPATIENT
Start: 2024-02-19 | End: 2024-02-20

## 2024-02-19 RX ADMIN — PANTOPRAZOLE SODIUM 40 MILLIGRAM(S): 20 TABLET, DELAYED RELEASE ORAL at 05:35

## 2024-02-19 RX ADMIN — SODIUM CHLORIDE 75 MILLILITER(S): 9 INJECTION, SOLUTION INTRAVENOUS at 21:51

## 2024-02-19 RX ADMIN — Medication 3 MILLILITER(S): at 20:41

## 2024-02-19 RX ADMIN — Medication 75 MILLIGRAM(S): at 05:35

## 2024-02-19 RX ADMIN — Medication 40 MILLIGRAM(S): at 05:35

## 2024-02-19 RX ADMIN — Medication 3 MILLILITER(S): at 13:51

## 2024-02-19 RX ADMIN — MONTELUKAST 10 MILLIGRAM(S): 4 TABLET, CHEWABLE ORAL at 12:16

## 2024-02-19 RX ADMIN — IOHEXOL 30 MILLILITER(S): 300 INJECTION, SOLUTION INTRAVENOUS at 21:48

## 2024-02-19 NOTE — CONSULT NOTE ADULT - ASSESSMENT
ASSESSMENT:  53yF w/ PMHx of  COPD, hyperthyroidism, GERD, anemia  who has a chronic recurrent umbilical hernia, now with pain to the surrounding area. Physical exam findings, imaging, and labs as documented above.     PLAN:  - Umbilical hernia is chronic and non reducible   - Patient complaining of pain not over bulge but to the right of the defect  - She remains passing gas and tolerating diet, but has not passed a BM in 3 days   - would recommend a CT abdomen and pelvis with oral and IV contrast         Above plan discussed with Attending Surgeon Dr. Barbour   , patient, patient family, and Primary team  02-19-24 @ 18:01

## 2024-02-19 NOTE — CONSULT NOTE ADULT - SUBJECTIVE AND OBJECTIVE BOX
GENERAL SURGERY CONSULT NOTE    Patient: ANDREW FISCHER , 53y ([birth year])Female   MRN: [id number]  Location: [address]  Visit: 02-15-24 Inpatient  Date: 02-19-24 @ 18:01    HPI:  53 year old female with PMH of COPD (not on home oxygen), HTN, and Hypothyroidism presenting for shortness of breath. Patient endorses worsening non-exertional shortness of breath, associated with a non-productive cough, nasal congestion, subjective fevers, and chest tightness since 2 days.   + GI upset and diarrhea 2 weeks ago (resolved)  no increase in sputum production or purulence. + sick contacts (her daughter).    she received her annual immunizations.    Patient was given Duonebs x1 and Decadron 12mg by EMS prior to arrival without improvement. Patient denies recent travels, history of DVT/PEs, nausea, vomiting, abdominal pain, back pain, constipation, rash, calf pain/tenderness/swelling,.    Patient admitted for COPD exacerbation   (15 Feb 2024 16:30)  Surgery consulted today (2/19) for new pain in the setting of chronically irreducible umbilical hernia. No obstructive symptoms.     PAST MEDICAL & SURGICAL HISTORY:  COPD (chronic obstructive pulmonary disease)  Hyperthyroidism  GERD (gastroesophageal reflux disease)  Anemia  Umbilical hernia  Abdominal hernia          Home Medications:  Trelegy Ellipta 100 mcg-62.5 mcg-25 mcg/inh inhalation powder: 1 puff(s) inhaled once a day (15 Feb 2024 16:39)        VITALS:  T(F): 98 (02-19-24 @ 12:34), Max: 98.3 (02-18-24 @ 23:26)  HR: 100 (02-19-24 @ 12:34) (61 - 100)  BP: 162/87 (02-19-24 @ 12:34) (99/55 - 162/87)  RR: 17 (02-19-24 @ 12:34) (17 - 18)  SpO2: 96% (02-19-24 @ 00:30) (95% - 96%)    PHYSICAL EXAM:  General: NAD, AAOx3, calm and cooperative  HEENT: NCAT, ANICETO, EOMI, Trachea ML, Neck supple  Cardiac: RRR S1, S2, no Murmurs, rubs or gallops  Respiratory: CTAB, normal respiratory effort, no labored breathing   Abdomen: Soft, non-distended, non-tender, umbilical hernia soft, partially reducible, no skin changes   Vascular: Pulses 2+ throughout, extremities well perfused  Skin: Warm/dry, normal color, no jaundice      MEDICATIONS  (STANDING):  albuterol    0.083%.. 2.5 milliGRAM(s) Nebulizer every 20 minutes  albuterol/ipratropium for Nebulization 3 milliLiter(s) Nebulizer every 6 hours  albuterol/ipratropium for Nebulization 3 milliLiter(s) Nebulizer every 20 minutes  budesonide 160 MICROgram(s)/formoterol 4.5 MICROgram(s) Inhaler 2 Puff(s) Inhalation two times a day  enoxaparin Injectable 40 milliGRAM(s) SubCutaneous every 24 hours  magnesium sulfate  IVPB 2 Gram(s) IV Intermittent once  methimazole 5 milliGRAM(s) Oral daily  montelukast 10 milliGRAM(s) Oral daily  pantoprazole    Tablet 40 milliGRAM(s) Oral before breakfast  predniSONE   Tablet 40 milliGRAM(s) Oral daily    MEDICATIONS  (PRN):  acetaminophen     Tablet .. 650 milliGRAM(s) Oral every 6 hours PRN Temp greater or equal to 38C (100.4F), Mild Pain (1 - 3)  morphine  - Injectable 1 milliGRAM(s) IV Push every 4 hours PRN Moderate Pain (4 - 6)      LAB/STUDIES:                        13.9   5.49  )-----------( 277      ( 19 Feb 2024 08:36 )             40.9     02-19    142  |  100  |  9<L>  ----------------------------<  137<H>  4.2   |  27  |  0.7    Ca    9.6      19 Feb 2024 08:36  Mg     1.8     02-19    TPro  8.2<H>  /  Alb  4.6  /  TBili  0.6  /  DBili  x   /  AST  41  /  ALT  36  /  AlkPhos  66  02-19      LIVER FUNCTIONS - ( 19 Feb 2024 08:36 )  Alb: 4.6 g/dL / Pro: 8.2 g/dL / ALK PHOS: 66 U/L / ALT: 36 U/L / AST: 41 U/L / GGT: x           Urinalysis Basic - ( 19 Feb 2024 08:36 )    Color: x / Appearance: x / SG: x / pH: x  Gluc: 137 mg/dL / Ketone: x  / Bili: x / Urobili: x   Blood: x / Protein: x / Nitrite: x   Leuk Esterase: x / RBC: x / WBC x   Sq Epi: x / Non Sq Epi: x / Bacteria: x      IMAGING:

## 2024-02-19 NOTE — DISCHARGE NOTE PROVIDER - NSDCFUADDAPPT_GEN_ALL_CORE_FT
Please follow up outpatient with your Pulmonologist, or have your PCP refer you to a Pulmonologist  Please follow up outpatient with your Pulmonologist, or have your PCP refer you to a Pulmonologist     Please see your PCP on the 26th and have a repeat CBC to monitor your hemoglobin, given a questionable rectus sheath hematoma on your imaging

## 2024-02-19 NOTE — DISCHARGE NOTE PROVIDER - NSDCCPCAREPLAN_GEN_ALL_CORE_FT
PRINCIPAL DISCHARGE DIAGNOSIS  Diagnosis: COPD with exacerbation  Assessment and Plan of Treatment: Chronic Obstructive Pulmonary Disease  Chronic obstructive pulmonary disease (COPD) is a lung condition in which airflow from the lungs is limited. Causes include smoking, secondhand smoke exposure, genetics, or recurrent infections. Take all medicines (inhaled or pills) as directed by your health care provider. Avoid exposure to irritants such as smoke, chemicals, and fumes that aggravate your breathing.  If you are a smoker, the most important thing that you can do is stop smoking. Continuing to smoke will cause further lung damage and breathing trouble. Ask your health care provider for help with quitting smoking.  SEEK IMMEDIATE MEDICAL CARE IF YOU HAVE ANY OF THE FOLLOWING SYMPTOMS: shortness of breath at rest or when talking, bluish discoloration of lips, skin, fever, worsening cough, unexplained chest pain, or lightheadedness/dizziness.        SECONDARY DISCHARGE DIAGNOSES  Diagnosis: Hypoxia  Assessment and Plan of Treatment:

## 2024-02-19 NOTE — DISCHARGE NOTE PROVIDER - NSDCMRMEDTOKEN_GEN_ALL_CORE_FT
albuterol 2.5 mg/3 mL (0.083%) inhalation solution: 3 milliliter(s) by nebulizer every 4 hours, As Needed for wheezing  methIMAzole 5 mg oral tablet: 1 tab(s) orally once a day   montelukast 10 mg oral tablet: 1 tab(s) orally once a day   pantoprazole 40 mg oral delayed release tablet: 1 tab(s) orally once a day   Trelegy Ellipta 100 mcg-62.5 mcg-25 mcg/inh inhalation powder: 1 puff(s) inhaled once a day  Ventolin HFA 90 mcg/inh inhalation aerosol: 2 puff(s) inhaled 4 times a day, As Needed    albuterol 2.5 mg/3 mL (0.083%) inhalation solution: 3 milliliter(s) by nebulizer every 4 hours, As Needed for wheezing  methIMAzole 5 mg oral tablet: 1 tab(s) orally once a day   montelukast 10 mg oral tablet: 1 tab(s) orally once a day   pantoprazole 40 mg oral delayed release tablet: 1 tab(s) orally once a day   predniSONE 20 mg oral tablet: 2 tab(s) orally once a day  Trelegy Ellipta 100 mcg-62.5 mcg-25 mcg/inh inhalation powder: 1 puff(s) inhaled once a day   albuterol 2.5 mg/3 mL (0.083%) inhalation solution: 3 milliliter(s) by nebulizer every 4 hours, As Needed for wheezing  methIMAzole 5 mg oral tablet: 1 tab(s) orally once a day   MiraLax oral powder for reconstitution: 17 gram(s) orally 2 times a day as needed for  constipation  montelukast 10 mg oral tablet: 1 tab(s) orally once a day   pantoprazole 40 mg oral delayed release tablet: 1 tab(s) orally once a day   senna (sennosides) 15 mg oral tablet: 2 tab(s) orally once a day (at bedtime)  Trelegy Ellipta 100 mcg-62.5 mcg-25 mcg/inh inhalation powder: 1 puff(s) inhaled once a day

## 2024-02-19 NOTE — DISCHARGE NOTE PROVIDER - HOSPITAL COURSE
53 year old female with PMH of COPD (not on home oxygen), HTN, and Hypothyroidism presenting for shortness of breath. Patient endorses worsening non-exertional shortness of breath.    #AECOPD  #AHRF  - c/w O2 NC target spO2 89-92%  - azithromycin qd  - duonebs q6  - symbicor bid  - tiotropium qd  - RVP -ve  - CXR no infiltrates  - check D-Dimer  - CTA chest if D-dimer elevated (r/o PE, tachycardia and hypoxia)      #Fever  - likely viral given recent sick contact  - rvp -ve  - tylenol  - follow up cultures   53 year old female with PMH of COPD (not on home oxygen), HTN, and Hypothyroidism presenting for shortness of breath. Patient endorses worsening non-exertional shortness of breath.    #AECOPD  - duonebs q6  - symbicor bid  - tiotropium qd  - RVP --> Influenza A positive   - CXR no infiltrates  -Prednisone 40 daily, continue for a total of 5 days  -Received a course of Tamiflu     Patient can be discharged. complete prednisone 40 mg for 2 more days.    53 year old female with PMH of COPD (not on home oxygen), HTN, and Hypothyroidism presenting for shortness of breath. Patient endorses worsening non-exertional shortness of breath.    #AECOPD  - duonebs q6  - symbicor bid  - tiotropium qd  - RVP --> Influenza A positive   - CXR no infiltrates  -Prednisone 40 daily, continue for a total of 5 days  -Received a course of Tamiflu     # Umbilical Hernia  - on exam non reducible   - mild pain on palpation   - surgery consulted    Patient can be discharged. complete prednisone 40 mg for 2 more days.    53 year old female with PMH of COPD (not on home oxygen), HTN, and Hypothyroidism presenting for shortness of breath. Patient endorses worsening non-exertional shortness of breath.    #AECOPD  - duonebs q6  - symbicor bid  - tiotropium qd  - RVP --> Influenza A positive   - CXR no infiltrates  -Prednisone 40 daily, continue for a total of 5 days  -Received a course of Tamiflu     # Umbilical Hernia  - on exam non reducible   - mild pain on palpation   - surgery consulted --> CT abdomen pelvis --> stable hernia, no SBO, however evidence of right rectus sheath hematoma     Patient can be discharged. complete prednisone 40 mg for 2 more days.    53 year old female with PMH of COPD (not on home oxygen), HTN, and Hypothyroidism presenting for shortness of breath. Patient endorses worsening non-exertional shortness of breath.    #AECOPD  - duonebs q6  - symbicor bid  - tiotropium qd  - RVP --> Influenza A positive   - CXR no infiltrates  -Prednisone 40 daily, continue for a total of 5 days  -Received a course of Tamiflu     # Umbilical Hernia  - on exam non reducible   - mild pain on palpation   - surgery consulted --> CT abdomen pelvis --> stable hernia, no SBO, however evidence of right rectus sheath hematoma ---> conservative management     Patient can be discharged. follow up with PCP as out.    53 year old female with PMH of COPD (not on home oxygen), HTN, and Hypothyroidism presenting for shortness of breath. Patient endorses worsening non-exertional shortness of breath.    #AECOPD  - symbicor bid  - tiotropium qd  - RVP --> Influenza A positive   - CXR no infiltrates  -Prednisone 40 daily, continue for a total of 5 days  -Received a course of Tamiflu     # Umbilical Hernia  - on exam non reducible   - mild pain on palpation   - surgery consulted --> CT abdomen pelvis --> stable hernia, no SBO, however evidence of right rectus sheath hematoma ---> conservative management     Patient can be discharged. follow up with PCP as outpatient for repeat CBC

## 2024-02-19 NOTE — CONSULT NOTE ADULT - ATTENDING COMMENTS
ACS Attending Note Attestation    Patient was seen and examined bedside.  I reviewed the resident/PA note and agreed with above assessment and plan with following additions and corrections.    53F PMH COPD, HTN, hypothyroidism admitted for COPD exacerbation. General surgery consulted for new pain related to ventral hernia. Had previous ventral hernia repair but it has since recurred. Reports having pain lateral to hernia with coughing. Was able to completely reduce in past but now unable. No nausea, vomiting. Passing flatus but no BM in several days.    T(C): 36.4 (02-19-24 @ 20:00), Max: 36.8 (02-18-24 @ 23:26)  HR: 95 (02-19-24 @ 20:00) (61 - 100)  BP: 132/75 (02-19-24 @ 20:00) (99/55 - 162/87)  RR: 18 (02-19-24 @ 20:00) (17 - 18)  SpO2: 96% (02-19-24 @ 00:30) (95% - 96%)      02-19-24 @ 07:01  -  02-19-24 @ 22:58  --------------------------------------------------------  IN:    Oral Fluid: 487 mL  Total IN: 487 mL    OUT:  Total OUT: 0 mL    Total NET: 487 mL    I independently performed a medically appropriate exam. The exam was notable for partially reducible ventral hernia. Tenderness to palpation lateral to hernia.                          13.9   5.49  )-----------( 277      ( 19 Feb 2024 08:36 )             40.9     02-19    142  |  100  |  9<L>  ----------------------------<  137<H>  4.2   |  27  |  0.7    Ca 9.6; Mg 1.8; Phos x       ( 19 Feb 2024 08:36 )  Alb: 4.6 g/dL / Pro: 8.2 g/dL / AlkPhos: 66 U/L / ALT: 36 U/L / AST: 41 U/L / GGT:x     / Tbili 0.6 mg/dL/ Dbili x     / Indbili x          Assessment/Plan:  53y Female PMH COPD, HTN, hypothyroidism with chronic recurrent partially reducible ventral hernia. Given pain lateral to hernia defect, would recommend CT A/P with IV and PO contrast. If unable to adequately prep for contrast allergy, can do PO contrast CT scan. This will likely be therapeutic to help with constipation as well.    Pantera Barbour MD  Trauma/ACS/Surgical Critical Care Attending

## 2024-02-19 NOTE — DISCHARGE NOTE PROVIDER - ATTENDING DISCHARGE PHYSICAL EXAMINATION:
Gen- middle-age F, NAD, non toxic appearing  Eyes- anicteric sclera, non injected conjunctiva, EOMI  ENT- hearing grossly intact  Chest- symmetrical chest rise  Cardiac- non tachycardic  Abdominal- non distended, umbilical hernia that is not reducible  Ext- no clubbing or cyanosis Gen- middle-age F, NAD, non toxic appearing  Eyes- EOMI  ENT- hearing grossly intact  Chest- symmetrical chest rise  Cardiac- non tachycardic  Abdominal- non distended, umbilical hernia that is not reducible  Ext- no clubbing or cyanosis

## 2024-02-19 NOTE — DISCHARGE NOTE PROVIDER - CARE PROVIDER_API CALL
Louis Dinh  Internal Medicine  [address] [place] [id number]  Phone: [phone]  Fax: [phone]  Follow Up Time: 2 weeks

## 2024-02-19 NOTE — DISCHARGE NOTE PROVIDER - NSDCFUSCHEDAPPT_GEN_ALL_CORE_FT
Louis Dinh  [facility]  Scheduled Appointment: 02/26/2024    Louis Dinh  [facility]  [address]  Scheduled Appointment: 02/26/2024    Dandre De La O  [facility]  Scheduled Appointment: 03/25/2024    Dandre De La O  [facility]  [facility] [address]  Scheduled Appointment: 03/25/2024

## 2024-02-20 VITALS
HEART RATE: 90 BPM | TEMPERATURE: 98 F | DIASTOLIC BLOOD PRESSURE: 69 MMHG | RESPIRATION RATE: 18 BRPM | SYSTOLIC BLOOD PRESSURE: 128 MMHG

## 2024-02-20 LAB
ALBUMIN SERPL ELPH-MCNC: 3.9 G/DL — SIGNIFICANT CHANGE UP (ref 3.5–5.2)
ALP SERPL-CCNC: 57 U/L — SIGNIFICANT CHANGE UP (ref 30–115)
ALT FLD-CCNC: 34 U/L — SIGNIFICANT CHANGE UP (ref 0–41)
ANION GAP SERPL CALC-SCNC: 12 MMOL/L — SIGNIFICANT CHANGE UP (ref 7–14)
AST SERPL-CCNC: 31 U/L — SIGNIFICANT CHANGE UP (ref 0–41)
BASOPHILS # BLD AUTO: 0 K/UL — SIGNIFICANT CHANGE UP (ref 0–0.2)
BASOPHILS NFR BLD AUTO: 0 % — SIGNIFICANT CHANGE UP (ref 0–1)
BILIRUB SERPL-MCNC: 0.3 MG/DL — SIGNIFICANT CHANGE UP (ref 0.2–1.2)
BUN SERPL-MCNC: 7 MG/DL — LOW (ref 10–20)
CALCIUM SERPL-MCNC: 9 MG/DL — SIGNIFICANT CHANGE UP (ref 8.4–10.5)
CHLORIDE SERPL-SCNC: 100 MMOL/L — SIGNIFICANT CHANGE UP (ref 98–110)
CO2 SERPL-SCNC: 28 MMOL/L — SIGNIFICANT CHANGE UP (ref 17–32)
CREAT SERPL-MCNC: 0.5 MG/DL — LOW (ref 0.7–1.5)
CULTURE RESULTS: SIGNIFICANT CHANGE UP
EGFR: 111 ML/MIN/1.73M2 — SIGNIFICANT CHANGE UP
EOSINOPHIL # BLD AUTO: 0 K/UL — SIGNIFICANT CHANGE UP (ref 0–0.7)
EOSINOPHIL NFR BLD AUTO: 0 % — SIGNIFICANT CHANGE UP (ref 0–8)
GLUCOSE SERPL-MCNC: 171 MG/DL — HIGH (ref 70–99)
HCT VFR BLD CALC: 34.4 % — LOW (ref 37–47)
HGB BLD-MCNC: 11.8 G/DL — LOW (ref 12–16)
LYMPHOCYTES # BLD AUTO: 0.65 K/UL — LOW (ref 1.2–3.4)
LYMPHOCYTES # BLD AUTO: 10.7 % — LOW (ref 20.5–51.1)
MAGNESIUM SERPL-MCNC: 1.7 MG/DL — LOW (ref 1.8–2.4)
MCHC RBC-ENTMCNC: 33.2 PG — HIGH (ref 27–31)
MCHC RBC-ENTMCNC: 34.3 G/DL — SIGNIFICANT CHANGE UP (ref 32–37)
MCV RBC AUTO: 96.9 FL — SIGNIFICANT CHANGE UP (ref 81–99)
MONOCYTES # BLD AUTO: 0.33 K/UL — SIGNIFICANT CHANGE UP (ref 0.1–0.6)
MONOCYTES NFR BLD AUTO: 5.4 % — SIGNIFICANT CHANGE UP (ref 1.7–9.3)
NEUTROPHILS # BLD AUTO: 4.7 K/UL — SIGNIFICANT CHANGE UP (ref 1.4–6.5)
NEUTROPHILS NFR BLD AUTO: 76.8 % — HIGH (ref 42.2–75.2)
PLATELET # BLD AUTO: 228 K/UL — SIGNIFICANT CHANGE UP (ref 130–400)
PMV BLD: 9.6 FL — SIGNIFICANT CHANGE UP (ref 7.4–10.4)
POTASSIUM SERPL-MCNC: 3.6 MMOL/L — SIGNIFICANT CHANGE UP (ref 3.5–5)
POTASSIUM SERPL-SCNC: 3.6 MMOL/L — SIGNIFICANT CHANGE UP (ref 3.5–5)
PROT SERPL-MCNC: 6.8 G/DL — SIGNIFICANT CHANGE UP (ref 6–8)
RBC # BLD: 3.55 M/UL — LOW (ref 4.2–5.4)
RBC # FLD: 12.7 % — SIGNIFICANT CHANGE UP (ref 11.5–14.5)
SODIUM SERPL-SCNC: 140 MMOL/L — SIGNIFICANT CHANGE UP (ref 135–146)
SPECIMEN SOURCE: SIGNIFICANT CHANGE UP
WBC # BLD: 6.12 K/UL — SIGNIFICANT CHANGE UP (ref 4.8–10.8)
WBC # FLD AUTO: 6.12 K/UL — SIGNIFICANT CHANGE UP (ref 4.8–10.8)

## 2024-02-20 PROCEDURE — 99232 SBSQ HOSP IP/OBS MODERATE 35: CPT

## 2024-02-20 PROCEDURE — 74177 CT ABD & PELVIS W/CONTRAST: CPT | Mod: 26

## 2024-02-20 PROCEDURE — 99239 HOSP IP/OBS DSCHRG MGMT >30: CPT

## 2024-02-20 RX ORDER — CHLORHEXIDINE GLUCONATE 213 G/1000ML
1 SOLUTION TOPICAL
Refills: 0 | Status: DISCONTINUED | OUTPATIENT
Start: 2024-02-20 | End: 2024-02-20

## 2024-02-20 RX ORDER — POLYETHYLENE GLYCOL 3350 17 G/17G
17 POWDER, FOR SOLUTION ORAL
Refills: 0 | Status: DISCONTINUED | OUTPATIENT
Start: 2024-02-20 | End: 2024-02-20

## 2024-02-20 RX ORDER — SENNA PLUS 8.6 MG/1
2 TABLET ORAL
Qty: 60 | Refills: 0
Start: 2024-02-20 | End: 2024-03-20

## 2024-02-20 RX ORDER — SENNA PLUS 8.6 MG/1
2 TABLET ORAL AT BEDTIME
Refills: 0 | Status: DISCONTINUED | OUTPATIENT
Start: 2024-02-20 | End: 2024-02-20

## 2024-02-20 RX ORDER — MAGNESIUM SULFATE 500 MG/ML
2 VIAL (ML) INJECTION ONCE
Refills: 0 | Status: COMPLETED | OUTPATIENT
Start: 2024-02-20 | End: 2024-02-20

## 2024-02-20 RX ORDER — POLYETHYLENE GLYCOL 3350 17 G/17G
17 POWDER, FOR SOLUTION ORAL
Qty: 2 | Refills: 0
Start: 2024-02-20 | End: 2024-03-20

## 2024-02-20 RX ADMIN — Medication 3 MILLILITER(S): at 13:28

## 2024-02-20 RX ADMIN — Medication 3 MILLILITER(S): at 07:54

## 2024-02-20 RX ADMIN — Medication 650 MILLIGRAM(S): at 00:12

## 2024-02-20 RX ADMIN — Medication 40 MILLIGRAM(S): at 05:22

## 2024-02-20 RX ADMIN — PANTOPRAZOLE SODIUM 40 MILLIGRAM(S): 20 TABLET, DELAYED RELEASE ORAL at 05:26

## 2024-02-20 RX ADMIN — Medication 650 MILLIGRAM(S): at 06:47

## 2024-02-20 RX ADMIN — MONTELUKAST 10 MILLIGRAM(S): 4 TABLET, CHEWABLE ORAL at 12:00

## 2024-02-20 RX ADMIN — Medication 150 GRAM(S): at 12:00

## 2024-02-20 RX ADMIN — CHLORHEXIDINE GLUCONATE 1 APPLICATION(S): 213 SOLUTION TOPICAL at 05:24

## 2024-02-20 RX ADMIN — Medication 650 MILLIGRAM(S): at 00:42

## 2024-02-20 NOTE — PROGRESS NOTE ADULT - ASSESSMENT
53yF w/ PMHx of  COPD, hyperthyroidism, GERD, anemia  who has a chronic recurrent umbilical hernia, now with pain to the surrounding area. Physical exam findings, imaging, and labs as documented above.     PLAN:  -care as per primary team  -no acute surgical intervention indicated  -chronic fat containing umbilical hernia  -new rectus sheath hematoma  -keep abdominal binder in place  -trend cbc  -restart regular diet  -dc ivfs  -bowel regimen for stool burden        Above plan discussed with Attending Surgeon Dr. Barbour   , patient, patient family, and Primary team  02-19-24 @ 18:01 53yF w/ PMHx of  COPD, hyperthyroidism, GERD, anemia  who has a chronic recurrent umbilical hernia, now with pain to the surrounding area. Physical exam findings, imaging, and labs as documented above.     PLAN:  -care as per primary team  -no acute surgical intervention indicated  -chronic fat containing umbilical hernia  -new rectus sheath hematoma  -keep abdominal binder in place  -trend cbc  -restart regular diet  -dc ivfs  -bowel regimen for stool burden  - surgery sign off       Above plan discussed with Attending Surgeon Dr. Barbour   , patient, patient family, and Primary team  02-19-24 @ 18:01

## 2024-02-20 NOTE — PROGRESS NOTE ADULT - ATTENDING COMMENTS
Patient tolerates po diet.  Umbilical hernia is soft, nontender  Has some pain in the right rectus muscle.    ASSESSMENT:  54 y/o female with Right Rectus Sheath Hematoma.  Umbilical hernia.    PLAN:  - regular diet  - ok to follow with me as outpatient if interested to have hernia repaired.  Please recall Surgery as needed

## 2024-02-20 NOTE — PROGRESS NOTE ADULT - SUBJECTIVE AND OBJECTIVE BOX
ANDREW FISCHER  53y, Female  Allergy: contrast media (iodine-based) (Hives)    Hospital Day: 1d    Patient seen and examined. No acute events overnight    PMH/PSH:  PAST MEDICAL & SURGICAL HISTORY:  COPD (chronic obstructive pulmonary disease)      Hyperthyroidism      GERD (gastroesophageal reflux disease)      Anemia      Umbilical hernia      Abdominal hernia          VITALS:  T(F): 100.3 (02-16-24 @ 07:40), Max: 100.8 (02-16-24 @ 05:35)  HR: 107 (02-16-24 @ 07:40)  BP: 136/86 (02-16-24 @ 07:40) (127/81 - 136/86)  RR: 20 (02-16-24 @ 07:40)  SpO2: 95% (02-16-24 @ 07:40)    TESTS & MEASUREMENTS:  Weight (Kg):                             12.4   4.33  )-----------( 209      ( 16 Feb 2024 05:56 )             36.2       02-16    138  |  100  |  7<L>  ----------------------------<  145<H>  3.8   |  23  |  0.6<L>    Ca    9.0      16 Feb 2024 05:56  Mg     2.0     02-16    TPro  8.1<H>  /  Alb  4.7  /  TBili  0.8  /  DBili  x   /  AST  31  /  ALT  23  /  AlkPhos  80  02-15    LIVER FUNCTIONS - ( 15 Feb 2024 11:02 )  Alb: 4.7 g/dL / Pro: 8.1 g/dL / ALK PHOS: 80 U/L / ALT: 23 U/L / AST: 31 U/L / GGT: x                 Urinalysis Basic - ( 16 Feb 2024 05:56 )    Color: x / Appearance: x / SG: x / pH: x  Gluc: 145 mg/dL / Ketone: x  / Bili: x / Urobili: x   Blood: x / Protein: x / Nitrite: x   Leuk Esterase: x / RBC: x / WBC x   Sq Epi: x / Non Sq Epi: x / Bacteria: x        RADIOLOGY & ADDITIONAL TESTS:    RECENT DIAGNOSTIC ORDERS:  Procalcitonin, Serum: AM Sched. Collection: 16-Feb-2024 04:30 (02-15-24 @ 17:34)  Diet, DASH/TLC:   Sodium & Cholesterol Restricted (02-15-24 @ 14:27)      MEDICATIONS:  MEDICATIONS  (STANDING):  albuterol    0.083%.. 2.5 milliGRAM(s) Nebulizer every 20 minutes  albuterol/ipratropium for Nebulization 3 milliLiter(s) Nebulizer every 6 hours  albuterol/ipratropium for Nebulization 3 milliLiter(s) Nebulizer every 20 minutes  budesonide 160 MICROgram(s)/formoterol 4.5 MICROgram(s) Inhaler 2 Puff(s) Inhalation two times a day  enoxaparin Injectable 40 milliGRAM(s) SubCutaneous every 24 hours  methimazole 5 milliGRAM(s) Oral daily  montelukast 10 milliGRAM(s) Oral daily  oseltamivir 75 milliGRAM(s) Oral two times a day  pantoprazole    Tablet 40 milliGRAM(s) Oral before breakfast  sodium chloride 0.9%. 1000 milliLiter(s) (200 mL/Hr) IV Continuous <Continuous>  tiotropium 2.5 MICROgram(s) Inhaler 2 Puff(s) Inhalation daily    MEDICATIONS  (PRN):  acetaminophen     Tablet .. 650 milliGRAM(s) Oral every 6 hours PRN Temp greater or equal to 38C (100.4F)      HOME MEDICATIONS:  Trelegy Ellipta 100 mcg-62.5 mcg-25 mcg/inh inhalation powder (02-15)      REVIEW OF SYSTEMS:  All other review of systems is negative unless indicated above.     PHYSICAL EXAM:  PHYSICAL EXAM:  GENERAL: NAD  HEAD:  Atraumatic, Normocephalic  NECK: Supple, No JVD  CHEST/LUNG: Mild exp wheeze  HEART: Regular rate and rhythm; No murmurs, rubs, or gallops  ABDOMEN: Soft, Nontender, Nondistended; Bowel sounds present  EXTREMITIES:  2+ Peripheral Pulses, No clubbing, cyanosis, or edema  SKIN: No rashes or lesions      
GENERAL SURGERY PROGRESS NOTE    Patient: ANDREW FISCHER , 53y ([birth year])Female   MRN: [id number]  Location: [address]  Visit: 02-15-24 Inpatient  Date: 02-20-24 @ 05:49      Procedure/Dx/Injuries: umbilical hernia and rectus sheath hematoma    Events of past 24 hours: ct a/p performed    PAST MEDICAL & SURGICAL HISTORY:  COPD (chronic obstructive pulmonary disease)      Hyperthyroidism      GERD (gastroesophageal reflux disease)      Anemia      Umbilical hernia      Abdominal hernia          Vitals:   T(F): 97.5 (02-20-24 @ 05:08), Max: 98 (02-19-24 @ 12:34)  HR: 100 (02-20-24 @ 05:08)  BP: 114/64 (02-20-24 @ 05:08)  RR: 18 (02-20-24 @ 05:08)  SpO2: --      Diet, NPO:   Except Medications      Fluids: lactated ringers.: Solution, 1000 milliLiter(s) infuse at 75 mL/Hr      I & O's:        PHYSICAL EXAM:  PHYSICAL EXAM:  GENERAL: No acute distress, well-developed  CHEST/LUNG: CTAB; No wheezes, rales, or rhonchi  HEART: Regular rate and rhythm.   ABDOMEN: Soft, non-tender, non-distended; normal bowel sounds, no organomegaly  EXTREMITIES:  2+ peripheral pulses b/l, No clubbing, cyanosis, or edema      MEDICATIONS  (STANDING):  albuterol    0.083%.. 2.5 milliGRAM(s) Nebulizer every 20 minutes  albuterol/ipratropium for Nebulization 3 milliLiter(s) Nebulizer every 6 hours  albuterol/ipratropium for Nebulization 3 milliLiter(s) Nebulizer every 20 minutes  budesonide 160 MICROgram(s)/formoterol 4.5 MICROgram(s) Inhaler 2 Puff(s) Inhalation two times a day  chlorhexidine 2% Cloths 1 Application(s) Topical <User Schedule>  diphenhydrAMINE Injectable 50 milliGRAM(s) IV Push once  enoxaparin Injectable 40 milliGRAM(s) SubCutaneous every 24 hours  lactated ringers. 1000 milliLiter(s) (75 mL/Hr) IV Continuous <Continuous>  magnesium sulfate  IVPB 2 Gram(s) IV Intermittent once  methimazole 5 milliGRAM(s) Oral daily  montelukast 10 milliGRAM(s) Oral daily  pantoprazole    Tablet 40 milliGRAM(s) Oral before breakfast    MEDICATIONS  (PRN):  acetaminophen     Tablet .. 650 milliGRAM(s) Oral every 6 hours PRN Temp greater or equal to 38C (100.4F), Mild Pain (1 - 3)  diphenhydrAMINE 25 milliGRAM(s) Oral every 4 hours PRN Rash and/or Itching  morphine  - Injectable 1 milliGRAM(s) IV Push every 4 hours PRN Moderate Pain (4 - 6)      DVT PROPHYLAXIS: enoxaparin Injectable 40 milliGRAM(s) SubCutaneous every 24 hours    GI PROPHYLAXIS: pantoprazole    Tablet 40 milliGRAM(s) Oral before breakfast    ANTICOAGULATION:   ANTIBIOTICS:            LAB/STUDIES:  Labs:  CAPILLARY BLOOD GLUCOSE                              13.9   5.49  )-----------( 277      ( 19 Feb 2024 08:36 )             40.9       Auto Neutrophil %: 70.4 % (02-19-24 @ 08:36)  Auto Immature Granulocyte %: 0.2 % (02-19-24 @ 08:36)    02-19    142  |  100  |  9<L>  ----------------------------<  137<H>  4.2   |  27  |  0.7      Calcium: 9.6 mg/dL (02-19-24 @ 08:36)      LFTs:             8.2  | 0.6  | 41       ------------------[66      ( 19 Feb 2024 08:36 )  4.6  | x    | 36          Lipase:x      Amylase:x             Coags:            Urinalysis Basic - ( 19 Feb 2024 08:36 )    Color: x / Appearance: x / SG: x / pH: x  Gluc: 137 mg/dL / Ketone: x  / Bili: x / Urobili: x   Blood: x / Protein: x / Nitrite: x   Leuk Esterase: x / RBC: x / WBC x   Sq Epi: x / Non Sq Epi: x / Bacteria: x                IMAGING:    < from: CT Abdomen and Pelvis w/ Oral Cont and w/ IV Cont (02.20.24 @ 02:07) >    IMPRESSION:  No significant change of a 5.8 x 3 x 5.6 cm fat-containing umbilical   hernia since 11/7/2016.  New relative asymmetric right rectus muscle thickening, raising   possibility of rectus sheath hematoma given historyof cough, difficult   to delineate from background muscle.    --- End of Report ---            < end of copied text >    ACCESS/ DEVICES:  [xx ] Peripheral IV  [ ] Central Venous Line	[ ] R	[ ] L	[ ] IJ	[ ] Fem	[ ] SC	Placed:   [ ] Arterial Line		[ ] R	[ ] L	[ ] Fem	[ ] Rad	[ ] Ax	Placed:   [ ] PICC:					[ ] Mediport  [ ] Urinary Catheter,  Date Placed:   [ ] Chest tube: [ ] Right, [ ] Left  [ ] ADEEL/Keon Drains      
24H events:    Patient is a 53y old Female who presents with a chief complaint of dyspnea (20 Feb 2024 05:49)    Primary diagnosis of COPD with exacerbation    Today is 5d of hospitalization. This morning patient was seen and examined at bedside, resting comfortably in bed.    No acute or major events overnight.    Code Status: Full    Family communication:  Contact date:  Name of person contacted:  Relationship to patient:  Communication details:  What matters most:    PAST MEDICAL & SURGICAL HISTORY  COPD (chronic obstructive pulmonary disease)    Hyperthyroidism    GERD (gastroesophageal reflux disease)    Anemia    Umbilical hernia    Abdominal hernia      ALLERGIES:  contrast media (iodine-based) (Hives)    MEDICATIONS:  STANDING MEDICATIONS  albuterol    0.083%.. 2.5 milliGRAM(s) Nebulizer every 20 minutes  albuterol/ipratropium for Nebulization 3 milliLiter(s) Nebulizer every 20 minutes  albuterol/ipratropium for Nebulization 3 milliLiter(s) Nebulizer every 6 hours  budesonide 160 MICROgram(s)/formoterol 4.5 MICROgram(s) Inhaler 2 Puff(s) Inhalation two times a day  chlorhexidine 2% Cloths 1 Application(s) Topical <User Schedule>  diphenhydrAMINE Injectable 50 milliGRAM(s) IV Push once  enoxaparin Injectable 40 milliGRAM(s) SubCutaneous every 24 hours  lactated ringers. 1000 milliLiter(s) IV Continuous <Continuous>  magnesium sulfate  IVPB 2 Gram(s) IV Intermittent once  methimazole 5 milliGRAM(s) Oral daily  montelukast 10 milliGRAM(s) Oral daily  pantoprazole    Tablet 40 milliGRAM(s) Oral before breakfast  polyethylene glycol 3350 17 Gram(s) Oral two times a day  senna 2 Tablet(s) Oral at bedtime    PRN MEDICATIONS  acetaminophen     Tablet .. 650 milliGRAM(s) Oral every 6 hours PRN  diphenhydrAMINE 25 milliGRAM(s) Oral every 4 hours PRN  morphine  - Injectable 1 milliGRAM(s) IV Push every 4 hours PRN    VITALS:   T(F): 97.5  HR: 100  BP: 114/64  RR: 18  SpO2: --    PHYSICAL EXAM:  GENERAL:   ( x) NAD, lying in bed comfortably     (  ) obtunded     (  ) lethargic     (  ) somnolent    HEAD:   ( x) Atraumatic     (  ) hematoma     (  ) laceration (specify location:       )     NECK:  (x) Supple     (  ) neck stiffness     (  ) nuchal rigidity     (  )  no JVD     (  ) JVD present ( -- cm)    HEART:  Rate -->     (x) normal rate     (  ) bradycardic     (  ) tachycardic  Rhythm -->     (x) regular     (  ) regularly irregular     (  ) irregularly irregular  Murmurs -->     (x) normal s1s2     (  ) systolic murmur     (  ) diastolic murmur     (  ) continuous murmur      (  ) S3 present     (  ) S4 present    LUNGS:   ( x)Unlabored respirations     (  ) tachypnea  ( x) B/L air entry     (  ) decreased breath sounds in:  (location     )    ( x) no adventitious sound     (  ) crackles     (  ) wheezing      (  ) rhonchi      (specify location:       )  (  ) chest wall tenderness (specify location:       )    ABDOMEN:   ( x) Soft     (  ) tense   |   (  ) nondistended     (  ) distended   |   (  ) +BS     (  ) hypoactive bowel sounds     (  ) hyperactive bowel sounds  ( x) nontender     (  ) RUQ tenderness     (  ) RLQ tenderness     (  ) LLQ tenderness     (  ) epigastric tenderness     (  ) diffuse tenderness  (  ) Splenomegaly      (  ) Hepatomegaly      (  ) Jaundice     (  ) ecchymosis     EXTREMITIES:  ( x) Normal     (  ) Rash     (  ) ecchymosis     (  ) varicose veins      (  ) pitting edema     (  ) non-pitting edema   (  ) ulceration     (  ) gangrene:     (location:     )    NERVOUS SYSTEM:    ( x) A&Ox3     (  ) confused     (  ) lethargic  CN II-XII:     ( x) Intact     (  ) deficits found     (Specify:     )         LABS:                        11.8   6.12  )-----------( 228      ( 20 Feb 2024 08:03 )             34.4     02-20    140  |  100  |  7<L>  ----------------------------<  171<H>  3.6   |  28  |  0.5<L>    Ca    9.0      20 Feb 2024 08:03  Mg     1.7     02-20    TPro  6.8  /  Alb  3.9  /  TBili  0.3  /  DBili  x   /  AST  31  /  ALT  34  /  AlkPhos  57  02-20      Urinalysis Basic - ( 20 Feb 2024 08:03 )    Color: x / Appearance: x / SG: x / pH: x  Gluc: 171 mg/dL / Ketone: x  / Bili: x / Urobili: x   Blood: x / Protein: x / Nitrite: x   Leuk Esterase: x / RBC: x / WBC x   Sq Epi: x / Non Sq Epi: x / Bacteria: x              
INTERVAL HPI/OVERNIGHT EVENTS:    SUBJECTIVE: Patient seen and examined at bedside.     no cp, sob, fever  +abd pain, no nausea, vomiting, diarrhea    OBJECTIVE:    VITAL SIGNS:  Vital Signs Last 24 Hrs  T(C): 36.9 (18 Feb 2024 07:46), Max: 37.2 (18 Feb 2024 00:14)  T(F): 98.4 (18 Feb 2024 07:46), Max: 99 (18 Feb 2024 00:14)  HR: 95 (18 Feb 2024 07:46) (87 - 95)  BP: 130/70 (18 Feb 2024 07:46) (109/53 - 130/70)  BP(mean): --  RR: 19 (18 Feb 2024 07:46) (19 - 20)  SpO2: 93% (18 Feb 2024 07:46) (93% - 96%)    Parameters below as of 18 Feb 2024 07:46  Patient On (Oxygen Delivery Method): room air          PHYSICAL EXAM:    General: NAD  HEENT: NC/AT; PERRL, clear conjunctiva  Neck: supple  Respiratory: CTA b/l  Cardiovascular: +S1/S2; RRR  Abdomen: soft, ttp R periumbilical/ND; +BS x4  Extremities: WWP, 2+ peripheral pulses b/l; no LE edema  Skin: normal color and turgor; no rash  Neurological:    MEDICATIONS:  MEDICATIONS  (STANDING):  albuterol    0.083%.. 2.5 milliGRAM(s) Nebulizer every 20 minutes  albuterol/ipratropium for Nebulization 3 milliLiter(s) Nebulizer every 6 hours  albuterol/ipratropium for Nebulization 3 milliLiter(s) Nebulizer every 20 minutes  budesonide 160 MICROgram(s)/formoterol 4.5 MICROgram(s) Inhaler 2 Puff(s) Inhalation two times a day  enoxaparin Injectable 40 milliGRAM(s) SubCutaneous every 24 hours  magnesium sulfate  IVPB 2 Gram(s) IV Intermittent once  methimazole 5 milliGRAM(s) Oral daily  montelukast 10 milliGRAM(s) Oral daily  oseltamivir 75 milliGRAM(s) Oral two times a day  pantoprazole    Tablet 40 milliGRAM(s) Oral before breakfast  predniSONE   Tablet 40 milliGRAM(s) Oral daily    MEDICATIONS  (PRN):  acetaminophen     Tablet .. 650 milliGRAM(s) Oral every 6 hours PRN Temp greater or equal to 38C (100.4F)  morphine  - Injectable 1 milliGRAM(s) IV Push every 4 hours PRN Moderate Pain (4 - 6)      ALLERGIES:  Allergies    contrast media (iodine-based) (Hives)    Intolerances        LABS:                        12.7   4.55  )-----------( 238      ( 18 Feb 2024 07:03 )             37.0     Hemoglobin: 12.7 g/dL (02-18 @ 07:03)  Hemoglobin: 14.1 g/dL (02-17 @ 06:55)  Hemoglobin: 12.4 g/dL (02-16 @ 05:56)  Hemoglobin: 13.7 g/dL (02-15 @ 11:02)    CBC Full  -  ( 18 Feb 2024 07:03 )  WBC Count : 4.55 K/uL  RBC Count : 3.88 M/uL  Hemoglobin : 12.7 g/dL  Hematocrit : 37.0 %  Platelet Count - Automated : 238 K/uL  Mean Cell Volume : 95.4 fL  Mean Cell Hemoglobin : 32.7 pg  Mean Cell Hemoglobin Concentration : 34.3 g/dL  Auto Neutrophil # : 3.07 K/uL  Auto Lymphocyte # : 1.09 K/uL  Auto Monocyte # : 0.34 K/uL  Auto Eosinophil # : 0.02 K/uL  Auto Basophil # : 0.02 K/uL  Auto Neutrophil % : 67.5 %  Auto Lymphocyte % : 24.0 %  Auto Monocyte % : 7.5 %  Auto Eosinophil % : 0.4 %  Auto Basophil % : 0.4 %    02-18    137  |  99  |  11  ----------------------------<  109<H>  3.7   |  25  |  0.6<L>    Ca    9.1      18 Feb 2024 07:03  Mg     1.7     02-18    TPro  7.1  /  Alb  4.1  /  TBili  0.4  /  DBili  x   /  AST  49<H>  /  ALT  33  /  AlkPhos  59  02-18    Creatinine Trend: 0.6<--, 0.8<--, 0.6<--, 0.7<--  LIVER FUNCTIONS - ( 18 Feb 2024 07:03 )  Alb: 4.1 g/dL / Pro: 7.1 g/dL / ALK PHOS: 59 U/L / ALT: 33 U/L / AST: 49 U/L / GGT: x               hs Troponin:            Urinalysis Basic - ( 18 Feb 2024 07:03 )    Color: x / Appearance: x / SG: x / pH: x  Gluc: 109 mg/dL / Ketone: x  / Bili: x / Urobili: x   Blood: x / Protein: x / Nitrite: x   Leuk Esterase: x / RBC: x / WBC x   Sq Epi: x / Non Sq Epi: x / Bacteria: x      CSF:                      EKG:   MICROBIOLOGY:    Culture - Blood (collected 15 Feb 2024 11:28)  Source: .Blood Blood  Preliminary Report (17 Feb 2024 22:01):    No growth at 48 Hours      IMAGING:      Labs, imaging, EKG personally reviewed    RADIOLOGY & ADDITIONAL TESTS: Reviewed.
INTERVAL HPI/OVERNIGHT EVENTS:    SUBJECTIVE: Patient seen and examined at bedside.     no cp, sob, abd pain, fever  no sob, orthopnea, pnd, cough    OBJECTIVE:    VITAL SIGNS:  Vital Signs Last 24 Hrs  T(C): 36.9 (17 Feb 2024 08:24), Max: 37.2 (17 Feb 2024 00:15)  T(F): 98.4 (17 Feb 2024 08:24), Max: 98.9 (17 Feb 2024 00:15)  HR: 95 (17 Feb 2024 08:24) (95 - 115)  BP: 112/67 (17 Feb 2024 08:24) (112/67 - 135/84)  BP(mean): 103 (16 Feb 2024 15:29) (103 - 103)  RR: 20 (17 Feb 2024 08:24) (20 - 20)  SpO2: 97% (17 Feb 2024 08:24) (94% - 97%)    Parameters below as of 17 Feb 2024 08:24  Patient On (Oxygen Delivery Method): room air          PHYSICAL EXAM:    General: NAD  HEENT: NC/AT; PERRL, clear conjunctiva  Neck: supple  Respiratory: CTA b/l  Cardiovascular: +S1/S2; RRR  Abdomen: soft, NT/ND; +BS x4  Extremities: WWP, 2+ peripheral pulses b/l; no LE edema  Skin: normal color and turgor; no rash  Neurological:    MEDICATIONS:  MEDICATIONS  (STANDING):  albuterol    0.083%.. 2.5 milliGRAM(s) Nebulizer every 20 minutes  albuterol/ipratropium for Nebulization 3 milliLiter(s) Nebulizer every 6 hours  albuterol/ipratropium for Nebulization 3 milliLiter(s) Nebulizer every 20 minutes  budesonide 160 MICROgram(s)/formoterol 4.5 MICROgram(s) Inhaler 2 Puff(s) Inhalation two times a day  enoxaparin Injectable 40 milliGRAM(s) SubCutaneous every 24 hours  ketorolac   Injectable 15 milliGRAM(s) IV Push once  methimazole 5 milliGRAM(s) Oral daily  montelukast 10 milliGRAM(s) Oral daily  oseltamivir 75 milliGRAM(s) Oral two times a day  pantoprazole    Tablet 40 milliGRAM(s) Oral before breakfast  predniSONE   Tablet 40 milliGRAM(s) Oral daily  tiotropium 2.5 MICROgram(s) Inhaler 2 Puff(s) Inhalation daily    MEDICATIONS  (PRN):  acetaminophen     Tablet .. 650 milliGRAM(s) Oral every 6 hours PRN Temp greater or equal to 38C (100.4F)      ALLERGIES:  Allergies    contrast media (iodine-based) (Hives)    Intolerances        LABS:                        14.1   5.60  )-----------( 227      ( 17 Feb 2024 06:55 )             42.4     Hemoglobin: 14.1 g/dL (02-17 @ 06:55)  Hemoglobin: 12.4 g/dL (02-16 @ 05:56)  Hemoglobin: 13.7 g/dL (02-15 @ 11:02)    CBC Full  -  ( 17 Feb 2024 06:55 )  WBC Count : 5.60 K/uL  RBC Count : 4.32 M/uL  Hemoglobin : 14.1 g/dL  Hematocrit : 42.4 %  Platelet Count - Automated : 227 K/uL  Mean Cell Volume : 98.1 fL  Mean Cell Hemoglobin : 32.6 pg  Mean Cell Hemoglobin Concentration : 33.3 g/dL  Auto Neutrophil # : 4.38 K/uL  Auto Lymphocyte # : 0.93 K/uL  Auto Monocyte # : 0.24 K/uL  Auto Eosinophil # : 0.01 K/uL  Auto Basophil # : 0.02 K/uL  Auto Neutrophil % : 78.1 %  Auto Lymphocyte % : 16.6 %  Auto Monocyte % : 4.3 %  Auto Eosinophil % : 0.2 %  Auto Basophil % : 0.4 %    02-17    139  |  98  |  9<L>  ----------------------------<  161<H>  4.1   |  25  |  0.8    Ca    9.5      17 Feb 2024 06:55  Mg     2.0     02-16    TPro  7.9  /  Alb  4.4  /  TBili  0.5  /  DBili  x   /  AST  56<H>  /  ALT  31  /  AlkPhos  70  02-17    Creatinine Trend: 0.8<--, 0.6<--, 0.7<--  LIVER FUNCTIONS - ( 17 Feb 2024 06:55 )  Alb: 4.4 g/dL / Pro: 7.9 g/dL / ALK PHOS: 70 U/L / ALT: 31 U/L / AST: 56 U/L / GGT: x               hs Troponin:                9 <<== 02-15-24 @ 11:16            Urinalysis Basic - ( 17 Feb 2024 06:55 )    Color: x / Appearance: x / SG: x / pH: x  Gluc: 161 mg/dL / Ketone: x  / Bili: x / Urobili: x   Blood: x / Protein: x / Nitrite: x   Leuk Esterase: x / RBC: x / WBC x   Sq Epi: x / Non Sq Epi: x / Bacteria: x      CSF:                      EKG:   MICROBIOLOGY:    Culture - Blood (collected 15 Feb 2024 11:28)  Source: .Blood Blood  Preliminary Report (16 Feb 2024 22:02):    No growth at 24 hours      IMAGING:      Labs, imaging, EKG personally reviewed    RADIOLOGY & ADDITIONAL TESTS: Reviewed.

## 2024-02-20 NOTE — PROGRESS NOTE ADULT - ASSESSMENT
53F PMHx COPD, HTN, hyperthyroidism, here with sepsis, present on admission, acute hypoxic resp failure; found to have influenza a.    #Abdominal pain.   -presumed due to hernia, in setting of cough  +umbilical hernia s/p repair 10 years prior  -surgery eval --> CT abdomen pelvis done   -No significant change of a 5.8 x 3 x 5.6 cm fat-containing umbilical hernia since 11/7/2016.  New relative asymmetric right rectus muscle thickening, raising possibility of rectus sheath hematoma given historyof cough, difficult to delineate from background muscle.  -surgery --> conservative management, trend CBC  -pain control  -diet as tolerated.    #COPD exacerbation   #Flu +ve   -cxr clear  -tamiflu for 5 days    -prednisone 40 for 5 days   duoneb q6, symbicort; singulair 10  PT.      #hyperthyroidism.   methimazole 5 daily       On deep vein thrombosis (DVT) prophylaxis. Lovenox.

## 2024-02-23 ENCOUNTER — APPOINTMENT (OUTPATIENT)
Dept: PULMONOLOGY | Facility: CLINIC | Age: 54
End: 2024-02-23

## 2024-03-14 ENCOUNTER — OUTPATIENT (OUTPATIENT)
Dept: OUTPATIENT SERVICES | Facility: HOSPITAL | Age: 54
LOS: 1 days | End: 2024-03-14
Payer: MEDICAID

## 2024-03-14 DIAGNOSIS — Z01.20 ENCOUNTER FOR DENTAL EXAMINATION AND CLEANING WITHOUT ABNORMAL FINDINGS: ICD-10-CM

## 2024-03-14 DIAGNOSIS — K46.9 UNSPECIFIED ABDOMINAL HERNIA WITHOUT OBSTRUCTION OR GANGRENE: Chronic | ICD-10-CM

## 2024-03-14 PROCEDURE — D0150: CPT

## 2024-03-14 PROCEDURE — D0230: CPT

## 2024-03-14 PROCEDURE — D0220: CPT

## 2024-03-19 DIAGNOSIS — Z01.21 ENCOUNTER FOR DENTAL EXAMINATION AND CLEANING WITH ABNORMAL FINDINGS: ICD-10-CM

## 2024-03-26 ENCOUNTER — OUTPATIENT (OUTPATIENT)
Dept: OUTPATIENT SERVICES | Facility: HOSPITAL | Age: 54
LOS: 1 days | End: 2024-03-26
Payer: MEDICAID

## 2024-03-26 ENCOUNTER — APPOINTMENT (OUTPATIENT)
Dept: INTERNAL MEDICINE | Facility: CLINIC | Age: 54
End: 2024-03-26
Payer: MEDICAID

## 2024-03-26 VITALS
TEMPERATURE: 97.3 F | HEIGHT: 65 IN | HEART RATE: 54 BPM | SYSTOLIC BLOOD PRESSURE: 139 MMHG | OXYGEN SATURATION: 99 % | BODY MASS INDEX: 19.99 KG/M2 | WEIGHT: 120 LBS | DIASTOLIC BLOOD PRESSURE: 97 MMHG

## 2024-03-26 DIAGNOSIS — E05.90 THYROTOXICOSIS, UNSPECIFIED W/OUT THYROTOXIC CRISIS OR STORM: ICD-10-CM

## 2024-03-26 DIAGNOSIS — Z00.00 ENCOUNTER FOR GENERAL ADULT MEDICAL EXAMINATION WITHOUT ABNORMAL FINDINGS: ICD-10-CM

## 2024-03-26 DIAGNOSIS — K45.8 OTHER SPECIFIED ABDOMINAL HERNIA W/OUT OBSTRUCTION OR GANGRENE: ICD-10-CM

## 2024-03-26 DIAGNOSIS — R09.82 POSTNASAL DRIP: ICD-10-CM

## 2024-03-26 DIAGNOSIS — I10 ESSENTIAL (PRIMARY) HYPERTENSION: ICD-10-CM

## 2024-03-26 DIAGNOSIS — Z00.00 ENCOUNTER FOR GENERAL ADULT MEDICAL EXAMINATION W/OUT ABNORMAL FINDINGS: ICD-10-CM

## 2024-03-26 DIAGNOSIS — K43.2 INCISIONAL HERNIA W/OUT OBSTRUCTION OR GANGRENE: ICD-10-CM

## 2024-03-26 DIAGNOSIS — G56.03 CARPAL TUNNEL SYNDROM,BILATERAL UPPER LIMBS: ICD-10-CM

## 2024-03-26 DIAGNOSIS — E55.9 VITAMIN D DEFICIENCY, UNSPECIFIED: ICD-10-CM

## 2024-03-26 DIAGNOSIS — G47.30 SLEEP APNEA, UNSPECIFIED: ICD-10-CM

## 2024-03-26 DIAGNOSIS — K21.9 GASTRO-ESOPHAGEAL REFLUX DISEASE W/OUT ESOPHAGITIS: ICD-10-CM

## 2024-03-26 DIAGNOSIS — K46.9 UNSPECIFIED ABDOMINAL HERNIA WITHOUT OBSTRUCTION OR GANGRENE: Chronic | ICD-10-CM

## 2024-03-26 DIAGNOSIS — J44.9 CHRONIC OBSTRUCTIVE PULMONARY DISEASE, UNSPECIFIED: ICD-10-CM

## 2024-03-26 DIAGNOSIS — D50.9 IRON DEFICIENCY ANEMIA, UNSPECIFIED: ICD-10-CM

## 2024-03-26 PROCEDURE — G2211 COMPLEX E/M VISIT ADD ON: CPT | Mod: NC,1L

## 2024-03-26 PROCEDURE — 99214 OFFICE O/P EST MOD 30 MIN: CPT

## 2024-03-26 RX ORDER — ADHESIVE TAPE 3"X 2.3 YD
50 MCG TAPE, NON-MEDICATED TOPICAL
Qty: 30 | Refills: 2 | Status: ACTIVE | COMMUNITY
Start: 2020-10-26 | End: 1900-01-01

## 2024-03-26 RX ORDER — PANTOPRAZOLE 20 MG/1
20 TABLET, DELAYED RELEASE ORAL DAILY
Qty: 90 | Refills: 1 | Status: ACTIVE | COMMUNITY
Start: 2020-05-12 | End: 1900-01-01

## 2024-03-26 RX ORDER — FLUTICASONE PROPIONATE 50 UG/1
50 SPRAY, METERED NASAL DAILY
Qty: 1 | Refills: 0 | Status: ACTIVE | COMMUNITY
Start: 2018-11-09 | End: 1900-01-01

## 2024-03-26 RX ORDER — PREDNISONE 50 MG/1
50 TABLET ORAL DAILY
Qty: 5 | Refills: 0 | Status: DISCONTINUED | COMMUNITY
Start: 2022-05-21 | End: 2024-03-26

## 2024-03-26 RX ORDER — METHIMAZOLE 5 MG/1
5 TABLET ORAL DAILY
Qty: 90 | Refills: 0 | Status: ACTIVE | COMMUNITY
Start: 2017-03-03 | End: 1900-01-01

## 2024-03-26 RX ORDER — OMEPRAZOLE 20 MG/1
20 CAPSULE, DELAYED RELEASE ORAL DAILY
Qty: 30 | Refills: 3 | Status: DISCONTINUED | COMMUNITY
Start: 2022-12-22 | End: 2024-03-26

## 2024-03-26 RX ORDER — FLUTICASONE FUROATE, UMECLIDINIUM BROMIDE AND VILANTEROL TRIFENATATE 100; 62.5; 25 UG/1; UG/1; UG/1
100-62.5-25 POWDER RESPIRATORY (INHALATION) DAILY
Qty: 28 | Refills: 4 | Status: ACTIVE | COMMUNITY
Start: 2023-01-17 | End: 1900-01-01

## 2024-03-26 RX ORDER — MONTELUKAST 10 MG/1
10 TABLET, FILM COATED ORAL DAILY
Qty: 1 | Refills: 2 | Status: ACTIVE | COMMUNITY
Start: 2020-05-12 | End: 1900-01-01

## 2024-03-26 NOTE — HISTORY OF PRESENT ILLNESS
[de-identified] : Mrs. Pedraza 53 yr old female with PMHx of copd, gerd, hyperthyroidism here to for a follow up visit.  Patient recently hosiptalized Feb 2024 for acute COPD exacerbation secondary to Influenza A and Enterovirus. Pulm started her on trilogy inhaler 6 months ago and only had this single hospitalization since, which is a major improvement per the patient.  Patient denies any other symptoms, requesting a refill of her medications. Although, no recent lipids, A1c. TSH in the hospital was elevated to around 4.6, currently using methimazole.  Patient endorsing dizziness while sitting for last week. Denies room spinning sensation and signs of orthostasis. Of note, she recently stopped smoking cigarettes 1 week ago.  [FreeTextEntry1] : routine f/u

## 2024-03-26 NOTE — ASSESSMENT
[FreeTextEntry1] : Mrs. Pedraza 52 yr old female with PMHx of copd, gerd, hyperthyroidism, microcytic anemia, cervical polyp s/p resection, umbilical hernia s/p surgery in 2010 here to for a follow up visit.   #Dizziness - dizzy while sitting, "feels unsteady" - no sensation of room spinning - endorses symptom onset with quitting smoking last week  #COPD - c/w with inhalers, notes improvement w/trelegy - medications refilled today 9/15 - follows w/pulm (last appt Jan 2023) - started on trilogy inhaler and c/w montelukast - c/w duonebs  #hyperthyroid on methimazole - on 5mg methimazole - f/u repeat TSH and free t4 levels  #GERD - c/w ppi - refilled pantoprazole   #Vit D deficiency - level 24 (5/2022) - c/w vit D supplement + multivitamin - f/u Vit D level  #normocytic anemia - hospital labs show Hg 11  - c/w iron supplements  #hypertension - was on 5mg amlodipine in past, not currently taking BP meds - /97 today, denies any headaches/symptoms   #umbical hernia  - surgery consulted during admission (Feb 2024) - CT abdomen pelvis -> stable hernia, no SBO, however evidence of right rectus sheath hematoma -> conservative management  #HCM - mammo referral given - never had colonoscopy before, willing to do FIT test // GI referral given - flu-shot given 9/2023 - rto in 3months and prn - f/u labs

## 2024-03-26 NOTE — PHYSICAL EXAM
[No Acute Distress] : no acute distress [Well Nourished] : well nourished [Well Developed] : well developed [Normal Sclera/Conjunctiva] : normal sclera/conjunctiva [Well-Appearing] : well-appearing [PERRL] : pupils equal round and reactive to light [EOMI] : extraocular movements intact [Normal Outer Ear/Nose] : the outer ears and nose were normal in appearance [Normal Oropharynx] : the oropharynx was normal [No JVD] : no jugular venous distention [No Lymphadenopathy] : no lymphadenopathy [Supple] : supple [Thyroid Normal, No Nodules] : the thyroid was normal and there were no nodules present [No Respiratory Distress] : no respiratory distress  [No Accessory Muscle Use] : no accessory muscle use [Clear to Auscultation] : lungs were clear to auscultation bilaterally [Normal Rate] : normal rate  [Regular Rhythm] : with a regular rhythm [Normal S1, S2] : normal S1 and S2 [No Murmur] : no murmur heard [No Carotid Bruits] : no carotid bruits [No Abdominal Bruit] : a ~M bruit was not heard ~T in the abdomen [No Varicosities] : no varicosities [Pedal Pulses Present] : the pedal pulses are present [No Edema] : there was no peripheral edema [No Palpable Aorta] : no palpable aorta [No Extremity Clubbing/Cyanosis] : no extremity clubbing/cyanosis [Soft] : abdomen soft [Non Tender] : non-tender [Non-distended] : non-distended [No Masses] : no abdominal mass palpated [No HSM] : no HSM [Normal Bowel Sounds] : normal bowel sounds [Normal Anterior Cervical Nodes] : no anterior cervical lymphadenopathy [Normal Posterior Cervical Nodes] : no posterior cervical lymphadenopathy [No CVA Tenderness] : no CVA  tenderness [No Spinal Tenderness] : no spinal tenderness [No Joint Swelling] : no joint swelling [Grossly Normal Strength/Tone] : grossly normal strength/tone [Coordination Grossly Intact] : coordination grossly intact [No Rash] : no rash [No Focal Deficits] : no focal deficits [Normal Gait] : normal gait [Deep Tendon Reflexes (DTR)] : deep tendon reflexes were 2+ and symmetric [Normal Affect] : the affect was normal [Normal Insight/Judgement] : insight and judgment were intact

## 2024-03-28 DIAGNOSIS — K45.8 OTHER SPECIFIED ABDOMINAL HERNIA WITHOUT OBSTRUCTION OR GANGRENE: ICD-10-CM

## 2024-03-28 DIAGNOSIS — K46.9 UNSPECIFIED ABDOMINAL HERNIA WITHOUT OBSTRUCTION OR GANGRENE: ICD-10-CM

## 2024-03-28 DIAGNOSIS — E55.9 VITAMIN D DEFICIENCY, UNSPECIFIED: ICD-10-CM

## 2024-03-28 DIAGNOSIS — E05.90 THYROTOXICOSIS, UNSPECIFIED WITHOUT THYROTOXIC CRISIS OR STORM: ICD-10-CM

## 2024-03-28 DIAGNOSIS — Z00.00 ENCOUNTER FOR GENERAL ADULT MEDICAL EXAMINATION WITHOUT ABNORMAL FINDINGS: ICD-10-CM

## 2024-03-28 DIAGNOSIS — K21.9 GASTRO-ESOPHAGEAL REFLUX DISEASE WITHOUT ESOPHAGITIS: ICD-10-CM

## 2024-03-28 DIAGNOSIS — J44.9 CHRONIC OBSTRUCTIVE PULMONARY DISEASE, UNSPECIFIED: ICD-10-CM

## 2024-03-28 DIAGNOSIS — I10 ESSENTIAL (PRIMARY) HYPERTENSION: ICD-10-CM

## 2024-03-28 DIAGNOSIS — D50.9 IRON DEFICIENCY ANEMIA, UNSPECIFIED: ICD-10-CM

## 2024-04-10 ENCOUNTER — OUTPATIENT (OUTPATIENT)
Dept: OUTPATIENT SERVICES | Facility: HOSPITAL | Age: 54
LOS: 1 days | End: 2024-04-10
Payer: MEDICAID

## 2024-04-10 DIAGNOSIS — K46.9 UNSPECIFIED ABDOMINAL HERNIA WITHOUT OBSTRUCTION OR GANGRENE: Chronic | ICD-10-CM

## 2024-04-10 DIAGNOSIS — Z00.00 ENCOUNTER FOR GENERAL ADULT MEDICAL EXAMINATION WITHOUT ABNORMAL FINDINGS: ICD-10-CM

## 2024-04-10 DIAGNOSIS — E78.00 PURE HYPERCHOLESTEROLEMIA, UNSPECIFIED: ICD-10-CM

## 2024-04-10 LAB
25(OH)D3 SERPL-MCNC: 54 NG/ML
ALBUMIN SERPL ELPH-MCNC: 4.5 G/DL
ALP BLD-CCNC: 60 U/L
ALT SERPL-CCNC: 11 U/L
ANION GAP SERPL CALC-SCNC: 13 MMOL/L
AST SERPL-CCNC: 13 U/L
BASOPHILS # BLD AUTO: 0.04 K/UL
BASOPHILS NFR BLD AUTO: 1 %
BILIRUB SERPL-MCNC: 0.4 MG/DL
BUN SERPL-MCNC: 6 MG/DL
CALCIUM SERPL-MCNC: 9.9 MG/DL
CHLORIDE SERPL-SCNC: 104 MMOL/L
CHOLEST SERPL-MCNC: 168 MG/DL
CO2 SERPL-SCNC: 26 MMOL/L
CREAT SERPL-MCNC: 0.7 MG/DL
EGFR: 102 ML/MIN/1.73M2
EOSINOPHIL # BLD AUTO: 0.31 K/UL
EOSINOPHIL NFR BLD AUTO: 7.4 %
ESTIMATED AVERAGE GLUCOSE: 105 MG/DL
GLUCOSE SERPL-MCNC: 100 MG/DL
HBA1C MFR BLD HPLC: 5.3 %
HCT VFR BLD CALC: 40.5 %
HDLC SERPL-MCNC: 84 MG/DL
HGB BLD-MCNC: 13.4 G/DL
IMM GRANULOCYTES NFR BLD AUTO: 0 %
LDLC SERPL CALC-MCNC: 69 MG/DL
LYMPHOCYTES # BLD AUTO: 2.03 K/UL
LYMPHOCYTES NFR BLD AUTO: 48.4 %
MAN DIFF?: NORMAL
MCHC RBC-ENTMCNC: 32.6 PG
MCHC RBC-ENTMCNC: 33.1 G/DL
MCV RBC AUTO: 98.5 FL
MONOCYTES # BLD AUTO: 0.35 K/UL
MONOCYTES NFR BLD AUTO: 8.4 %
NEUTROPHILS # BLD AUTO: 1.46 K/UL
NEUTROPHILS NFR BLD AUTO: 34.8 %
NONHDLC SERPL-MCNC: 84 MG/DL
PLATELET # BLD AUTO: 325 K/UL
PMV BLD AUTO: 0 /100 WBCS
POTASSIUM SERPL-SCNC: 4.5 MMOL/L
PROT SERPL-MCNC: 7.6 G/DL
RBC # BLD: 4.11 M/UL
RBC # FLD: 13.1 %
SODIUM SERPL-SCNC: 143 MMOL/L
TRIGL SERPL-MCNC: 76 MG/DL
TSH SERPL-ACNC: 2.3 UIU/ML
WBC # FLD AUTO: 4.19 K/UL

## 2024-04-10 PROCEDURE — 80061 LIPID PANEL: CPT

## 2024-04-10 PROCEDURE — 85027 COMPLETE CBC AUTOMATED: CPT

## 2024-04-10 PROCEDURE — 36415 COLL VENOUS BLD VENIPUNCTURE: CPT

## 2024-04-10 PROCEDURE — 83036 HEMOGLOBIN GLYCOSYLATED A1C: CPT

## 2024-04-10 PROCEDURE — 80053 COMPREHEN METABOLIC PANEL: CPT

## 2024-04-10 PROCEDURE — 84443 ASSAY THYROID STIM HORMONE: CPT

## 2024-04-10 PROCEDURE — 82306 VITAMIN D 25 HYDROXY: CPT

## 2024-04-11 DIAGNOSIS — Z00.00 ENCOUNTER FOR GENERAL ADULT MEDICAL EXAMINATION WITHOUT ABNORMAL FINDINGS: ICD-10-CM

## 2024-04-11 DIAGNOSIS — E78.00 PURE HYPERCHOLESTEROLEMIA, UNSPECIFIED: ICD-10-CM

## 2024-05-19 ENCOUNTER — NON-APPOINTMENT (OUTPATIENT)
Age: 54
End: 2024-05-19

## 2024-05-23 NOTE — ED PROVIDER NOTE - PROGRESS NOTE DETAILS
PP: COPD exacerbation. Patient has improved wheezing on exam at rest and endorses improved breathing, however, when walking patient feels "winded", SOB, tachypneic, and does not feel comfortable being discharged at this time. Will admit. Private car No (0)

## 2024-06-17 ENCOUNTER — OUTPATIENT (OUTPATIENT)
Dept: OUTPATIENT SERVICES | Facility: HOSPITAL | Age: 54
LOS: 1 days | End: 2024-06-17
Payer: MEDICAID

## 2024-06-17 ENCOUNTER — APPOINTMENT (OUTPATIENT)
Dept: OBGYN | Facility: CLINIC | Age: 54
End: 2024-06-17

## 2024-06-17 DIAGNOSIS — N32.81 OVERACTIVE BLADDER: ICD-10-CM

## 2024-06-17 DIAGNOSIS — K46.9 UNSPECIFIED ABDOMINAL HERNIA W/OUT OBSTRUCTION OR GANGRENE: ICD-10-CM

## 2024-06-17 DIAGNOSIS — Z01.419 ENCOUNTER FOR GYNECOLOGICAL EXAMINATION (GENERAL) (ROUTINE) W/OUT ABNORMAL FINDINGS: ICD-10-CM

## 2024-06-17 DIAGNOSIS — Z01.419 ENCOUNTER FOR GYNECOLOGICAL EXAMINATION (GENERAL) (ROUTINE) WITHOUT ABNORMAL FINDINGS: ICD-10-CM

## 2024-06-17 DIAGNOSIS — K46.9 UNSPECIFIED ABDOMINAL HERNIA WITHOUT OBSTRUCTION OR GANGRENE: Chronic | ICD-10-CM

## 2024-06-17 PROCEDURE — 99213 OFFICE O/P EST LOW 20 MIN: CPT | Mod: 25

## 2024-06-17 PROCEDURE — 99396 PREV VISIT EST AGE 40-64: CPT

## 2024-06-17 PROCEDURE — 87624 HPV HI-RISK TYP POOLED RSLT: CPT

## 2024-06-17 PROCEDURE — 99406 BEHAV CHNG SMOKING 3-10 MIN: CPT

## 2024-06-17 PROCEDURE — 99213 OFFICE O/P EST LOW 20 MIN: CPT

## 2024-06-17 PROCEDURE — 88142 CYTOPATH C/V THIN LAYER: CPT

## 2024-06-17 PROCEDURE — 99459 PELVIC EXAMINATION: CPT

## 2024-06-17 NOTE — PHYSICAL EXAM
[Chaperone Present] : A chaperone was present in the examining room during all aspects of the physical examination [88084] : A chaperone was present during the pelvic exam. [Appropriately responsive] : appropriately responsive [Alert] : alert [No Acute Distress] : no acute distress [Soft] : soft [Non-tender] : non-tender [Non-distended] : non-distended [No HSM] : No HSM [No Lesions] : no lesions [No Mass] : no mass [Oriented x3] : oriented x3 [FreeTextEntry7] : ?hernia vs cystic structure under the skin [Examination Of The Breasts] : a normal appearance [No Masses] : no breast masses were palpable [Labia Minora] : normal [Labia Majora] : normal [Normal] : normal [Uterine Adnexae] : normal

## 2024-06-17 NOTE — PLAN
[FreeTextEntry1] : Annual GYN AUB resolved - now in menopause C/O menopausal symptoms - various options discussed. I am recommending HRT. Patient will think about it. Mammo ordered Pap/hpv OAB - refer to UROGYN Hernia vs abdominal wall cyst - refer to general surgery.

## 2024-06-17 NOTE — COUNSELING
[Nutrition/ Exercise/ Weight Management] : nutrition, exercise, weight management [Breast Self Exam] : breast self exam [Yes] : Risk of tobacco use and health benefits of smoking cessation discussed: Yes [Cessation strategies including cessation program discussed] : Cessation strategies including cessation program discussed [Use of nicotine replacement therapies and other medications discussed] : Use of nicotine replacement therapies and other medications discussed [FreeTextEntry1] : 7

## 2024-06-17 NOTE — HISTORY OF PRESENT ILLNESS
[FreeTextEntry1] : 53 p3 No period x 2 year. C/O hot flashes x 1 year. Happens daily. Wants to know if there are any medication that can help.  Pmhx: COPD, Hyperthyroid  Meds: Methimazole, pantoprazole, Trelegy, albuterol, Duoneb, vit d.  Last mammo - scheduled in the next month.

## 2024-06-18 ENCOUNTER — OUTPATIENT (OUTPATIENT)
Dept: OUTPATIENT SERVICES | Facility: HOSPITAL | Age: 54
LOS: 1 days | End: 2024-06-18

## 2024-06-18 DIAGNOSIS — Z00.00 ENCOUNTER FOR GENERAL ADULT MEDICAL EXAMINATION WITHOUT ABNORMAL FINDINGS: ICD-10-CM

## 2024-06-18 DIAGNOSIS — K46.9 UNSPECIFIED ABDOMINAL HERNIA WITHOUT OBSTRUCTION OR GANGRENE: ICD-10-CM

## 2024-06-18 DIAGNOSIS — Z01.419 ENCOUNTER FOR GYNECOLOGICAL EXAMINATION (GENERAL) (ROUTINE) WITHOUT ABNORMAL FINDINGS: ICD-10-CM

## 2024-06-18 DIAGNOSIS — N32.81 OVERACTIVE BLADDER: ICD-10-CM

## 2024-06-18 DIAGNOSIS — K46.9 UNSPECIFIED ABDOMINAL HERNIA WITHOUT OBSTRUCTION OR GANGRENE: Chronic | ICD-10-CM

## 2024-06-19 DIAGNOSIS — Z00.00 ENCOUNTER FOR GENERAL ADULT MEDICAL EXAMINATION WITHOUT ABNORMAL FINDINGS: ICD-10-CM

## 2024-06-20 LAB — HPV HIGH+LOW RISK DNA PNL CVX: NOT DETECTED

## 2024-06-24 NOTE — ED ADULT TRIAGE NOTE - MODE OF ARRIVAL
Walk in
Detail Level: Zone
Plan: Discussed sending in a topical steroid to use as needed, patient declines today.  Pt states it is worse in the summer.  He wears cowboy boots, long socks, and jeans.  It has been going on since he was a kid.
Otc Regimen: All fragrance and dye free products. Moisturizer daily

## 2024-07-27 ENCOUNTER — APPOINTMENT (OUTPATIENT)
Dept: INTERNAL MEDICINE | Facility: CLINIC | Age: 54
End: 2024-07-27
Payer: MEDICAID

## 2024-07-27 VITALS
TEMPERATURE: 97.5 F | HEART RATE: 108 BPM | OXYGEN SATURATION: 100 % | DIASTOLIC BLOOD PRESSURE: 85 MMHG | SYSTOLIC BLOOD PRESSURE: 119 MMHG | HEIGHT: 65 IN | WEIGHT: 122 LBS | BODY MASS INDEX: 20.33 KG/M2

## 2024-07-27 DIAGNOSIS — F41.1 GENERALIZED ANXIETY DISORDER: ICD-10-CM

## 2024-07-27 DIAGNOSIS — E05.90 THYROTOXICOSIS, UNSPECIFIED W/OUT THYROTOXIC CRISIS OR STORM: ICD-10-CM

## 2024-07-27 DIAGNOSIS — J44.9 CHRONIC OBSTRUCTIVE PULMONARY DISEASE, UNSPECIFIED: ICD-10-CM

## 2024-07-27 PROCEDURE — G2211 COMPLEX E/M VISIT ADD ON: CPT | Mod: NC,1L

## 2024-07-27 PROCEDURE — 99214 OFFICE O/P EST MOD 30 MIN: CPT

## 2024-07-27 NOTE — PLAN
[FreeTextEntry1] : #COPD - tightness felt today likley due to MSK. Patient reassured.  - c/w with inhalers, notes improvement w/trelegy - medications refilled today  - c/w duonebs - advised patient to continue to decrease her smoking. Currently down to 3 cigarrets a day.   #hyperthyroid on methimazole - on 5mg methimazole - f/u repeat TSH and free t4 levels  #GERD - c/w ppi - refilled pantoprazole  #Vit D deficiency - c/w vit D supplement + multivitamin - f/u Vit D level  #hypertension - Patient taking amlodipine 5mg  - /85 today, denies any headaches/symptoms - Medications refilled today   #HCM - mammo referral reprinted today - rto in 3months and prn - f/u labs.

## 2024-07-27 NOTE — ASSESSMENT
[FreeTextEntry1] : #COPD  - Tightness is likely MSK related. Discomfort reproducible on physical exam.

## 2024-07-27 NOTE — REVIEW OF SYSTEMS
[Shortness Of Breath] : shortness of breath [Negative] : Neurological [FreeTextEntry6] : COPD improved with medications

## 2024-07-27 NOTE — HISTORY OF PRESENT ILLNESS
[FreeTextEntry1] : routine f/u [de-identified] : Mrs. Pedraza 53 yr old female with PMHx of copd, gerd, hyperthyroidism here to for a follow up visit.  pt complaining of chest tightness in the upper chest when she woke up. some improvement after taking medication + nebulizer.  Denies any wheezing, coughing or pain. Patient was able to walk to clinic this morning. no chest pain or radiation Pt has had similar episodes of chest tightness in the past and states it likely related to the weather.  Pt is still smoking 3x day   Pt is taking Tylenol arthritis BID with significant improvement to arthritis   Having some headaches related to dental issues - patient is being followed by dentist

## 2024-07-27 NOTE — PHYSICAL EXAM
[Normal] : normal rate, regular rhythm, normal S1 and S2 and no murmur heard [de-identified] : some tenderness on palpation pn anterior chest under clavical.

## 2024-08-02 ENCOUNTER — APPOINTMENT (OUTPATIENT)
Dept: PULMONOLOGY | Facility: CLINIC | Age: 54
End: 2024-08-02

## 2024-08-02 RX ORDER — PREDNISONE 20 MG/1
20 TABLET ORAL DAILY
Qty: 10 | Refills: 0 | Status: ACTIVE | COMMUNITY
Start: 2024-08-02 | End: 1900-01-01

## 2024-08-05 NOTE — DISCHARGE NOTE ADULT - FUNCTIONAL SCREEN CURRENT LEVEL: AMBULATION, MLM
0 = independent Aurelio: Patient doing well on reassessment. Discussed negative findings and plan for patient to follow up with GASTROENTEROLOGY. Contact information for specialist given and explained to PT how to contact ED referral coordinator for assistance in getting an appointment and provided that number to patient. Pt expressed understanding. Patient amendable to discharge at this time. Return precautions provided. [Patient] : patient [Mother] : mother

## 2024-08-28 ENCOUNTER — OUTPATIENT (OUTPATIENT)
Dept: OUTPATIENT SERVICES | Facility: HOSPITAL | Age: 54
LOS: 1 days | End: 2024-08-28
Payer: COMMERCIAL

## 2024-08-28 DIAGNOSIS — K46.9 UNSPECIFIED ABDOMINAL HERNIA WITHOUT OBSTRUCTION OR GANGRENE: Chronic | ICD-10-CM

## 2024-08-28 PROCEDURE — 84443 ASSAY THYROID STIM HORMONE: CPT

## 2024-08-28 PROCEDURE — 80061 LIPID PANEL: CPT

## 2024-08-28 PROCEDURE — 80053 COMPREHEN METABOLIC PANEL: CPT

## 2024-08-28 PROCEDURE — 83036 HEMOGLOBIN GLYCOSYLATED A1C: CPT

## 2024-08-28 PROCEDURE — 85027 COMPLETE CBC AUTOMATED: CPT

## 2024-09-01 ENCOUNTER — NON-APPOINTMENT (OUTPATIENT)
Age: 54
End: 2024-09-01

## 2024-09-09 DIAGNOSIS — Z00.00 ENCOUNTER FOR GENERAL ADULT MEDICAL EXAMINATION WITHOUT ABNORMAL FINDINGS: ICD-10-CM

## 2024-09-10 DIAGNOSIS — Z00.00 ENCOUNTER FOR GENERAL ADULT MEDICAL EXAMINATION WITHOUT ABNORMAL FINDINGS: ICD-10-CM

## 2024-09-27 ENCOUNTER — APPOINTMENT (OUTPATIENT)
Dept: INTERNAL MEDICINE | Facility: CLINIC | Age: 54
End: 2024-09-27

## 2024-09-29 ENCOUNTER — INPATIENT (INPATIENT)
Facility: HOSPITAL | Age: 54
LOS: 1 days | Discharge: HOME | DRG: 140 | End: 2024-10-01
Attending: INTERNAL MEDICINE | Admitting: STUDENT IN AN ORGANIZED HEALTH CARE EDUCATION/TRAINING PROGRAM
Payer: MEDICAID

## 2024-09-29 VITALS
TEMPERATURE: 99 F | WEIGHT: 119.05 LBS | OXYGEN SATURATION: 99 % | SYSTOLIC BLOOD PRESSURE: 124 MMHG | RESPIRATION RATE: 18 BRPM | DIASTOLIC BLOOD PRESSURE: 79 MMHG | HEART RATE: 104 BPM

## 2024-09-29 DIAGNOSIS — J44.9 CHRONIC OBSTRUCTIVE PULMONARY DISEASE, UNSPECIFIED: ICD-10-CM

## 2024-09-29 DIAGNOSIS — K46.9 UNSPECIFIED ABDOMINAL HERNIA WITHOUT OBSTRUCTION OR GANGRENE: Chronic | ICD-10-CM

## 2024-09-29 LAB
ALBUMIN SERPL ELPH-MCNC: 4.5 G/DL — SIGNIFICANT CHANGE UP (ref 3.5–5.2)
ALP SERPL-CCNC: 68 U/L — SIGNIFICANT CHANGE UP (ref 30–115)
ALT FLD-CCNC: 12 U/L — SIGNIFICANT CHANGE UP (ref 0–41)
ANION GAP SERPL CALC-SCNC: 14 MMOL/L — SIGNIFICANT CHANGE UP (ref 7–14)
AST SERPL-CCNC: 16 U/L — SIGNIFICANT CHANGE UP (ref 0–41)
BASE EXCESS BLDV CALC-SCNC: 3.4 MMOL/L — HIGH (ref -2–3)
BASOPHILS # BLD AUTO: 0.04 K/UL — SIGNIFICANT CHANGE UP (ref 0–0.2)
BASOPHILS NFR BLD AUTO: 0.7 % — SIGNIFICANT CHANGE UP (ref 0–1)
BILIRUB SERPL-MCNC: 0.4 MG/DL — SIGNIFICANT CHANGE UP (ref 0.2–1.2)
BUN SERPL-MCNC: 6 MG/DL — LOW (ref 10–20)
CA-I SERPL-SCNC: 1.21 MMOL/L — SIGNIFICANT CHANGE UP (ref 1.15–1.33)
CALCIUM SERPL-MCNC: 9.3 MG/DL — SIGNIFICANT CHANGE UP (ref 8.4–10.5)
CHLORIDE SERPL-SCNC: 102 MMOL/L — SIGNIFICANT CHANGE UP (ref 98–110)
CO2 SERPL-SCNC: 23 MMOL/L — SIGNIFICANT CHANGE UP (ref 17–32)
CREAT SERPL-MCNC: 0.7 MG/DL — SIGNIFICANT CHANGE UP (ref 0.7–1.5)
EGFR: 102 ML/MIN/1.73M2 — SIGNIFICANT CHANGE UP
EOSINOPHIL # BLD AUTO: 0.17 K/UL — SIGNIFICANT CHANGE UP (ref 0–0.7)
EOSINOPHIL NFR BLD AUTO: 2.9 % — SIGNIFICANT CHANGE UP (ref 0–8)
GAS PNL BLDV: 136 MMOL/L — SIGNIFICANT CHANGE UP (ref 136–145)
GAS PNL BLDV: SIGNIFICANT CHANGE UP
GAS PNL BLDV: SIGNIFICANT CHANGE UP
GLUCOSE SERPL-MCNC: 124 MG/DL — HIGH (ref 70–99)
HCO3 BLDV-SCNC: 28 MMOL/L — SIGNIFICANT CHANGE UP (ref 22–29)
HCT VFR BLD CALC: 40.2 % — SIGNIFICANT CHANGE UP (ref 37–47)
HCT VFR BLDA CALC: 43 % — SIGNIFICANT CHANGE UP (ref 34.5–46.5)
HGB BLD CALC-MCNC: 14.2 G/DL — SIGNIFICANT CHANGE UP (ref 11.7–16.1)
HGB BLD-MCNC: 13.6 G/DL — SIGNIFICANT CHANGE UP (ref 12–16)
IMM GRANULOCYTES NFR BLD AUTO: 0.2 % — SIGNIFICANT CHANGE UP (ref 0.1–0.3)
LACTATE BLDV-MCNC: 1.5 MMOL/L — SIGNIFICANT CHANGE UP (ref 0.5–2)
LYMPHOCYTES # BLD AUTO: 1.5 K/UL — SIGNIFICANT CHANGE UP (ref 1.2–3.4)
LYMPHOCYTES # BLD AUTO: 25.4 % — SIGNIFICANT CHANGE UP (ref 20.5–51.1)
MCHC RBC-ENTMCNC: 32.9 PG — HIGH (ref 27–31)
MCHC RBC-ENTMCNC: 33.8 G/DL — SIGNIFICANT CHANGE UP (ref 32–37)
MCV RBC AUTO: 97.3 FL — SIGNIFICANT CHANGE UP (ref 81–99)
MONOCYTES # BLD AUTO: 0.54 K/UL — SIGNIFICANT CHANGE UP (ref 0.1–0.6)
MONOCYTES NFR BLD AUTO: 9.2 % — SIGNIFICANT CHANGE UP (ref 1.7–9.3)
NEUTROPHILS # BLD AUTO: 3.64 K/UL — SIGNIFICANT CHANGE UP (ref 1.4–6.5)
NEUTROPHILS NFR BLD AUTO: 61.6 % — SIGNIFICANT CHANGE UP (ref 42.2–75.2)
NRBC # BLD: 0 /100 WBCS — SIGNIFICANT CHANGE UP (ref 0–0)
PCO2 BLDV: 44 MMHG — HIGH (ref 39–42)
PH BLDV: 7.42 — SIGNIFICANT CHANGE UP (ref 7.32–7.43)
PLATELET # BLD AUTO: 303 K/UL — SIGNIFICANT CHANGE UP (ref 130–400)
PMV BLD: 10.3 FL — SIGNIFICANT CHANGE UP (ref 7.4–10.4)
PO2 BLDV: 56 MMHG — HIGH (ref 25–45)
POTASSIUM BLDV-SCNC: 3.3 MMOL/L — LOW (ref 3.5–5.1)
POTASSIUM SERPL-MCNC: 3.6 MMOL/L — SIGNIFICANT CHANGE UP (ref 3.5–5)
POTASSIUM SERPL-SCNC: 3.6 MMOL/L — SIGNIFICANT CHANGE UP (ref 3.5–5)
PROT SERPL-MCNC: 7.2 G/DL — SIGNIFICANT CHANGE UP (ref 6–8)
RBC # BLD: 4.13 M/UL — LOW (ref 4.2–5.4)
RBC # FLD: 12.6 % — SIGNIFICANT CHANGE UP (ref 11.5–14.5)
SAO2 % BLDV: 89.5 % — HIGH (ref 67–88)
SODIUM SERPL-SCNC: 139 MMOL/L — SIGNIFICANT CHANGE UP (ref 135–146)
WBC # BLD: 5.9 K/UL — SIGNIFICANT CHANGE UP (ref 4.8–10.8)
WBC # FLD AUTO: 5.9 K/UL — SIGNIFICANT CHANGE UP (ref 4.8–10.8)

## 2024-09-29 PROCEDURE — 99285 EMERGENCY DEPT VISIT HI MDM: CPT

## 2024-09-29 PROCEDURE — 94640 AIRWAY INHALATION TREATMENT: CPT

## 2024-09-29 PROCEDURE — 99223 1ST HOSP IP/OBS HIGH 75: CPT | Mod: 25

## 2024-09-29 PROCEDURE — 80048 BASIC METABOLIC PNL TOTAL CA: CPT

## 2024-09-29 PROCEDURE — 83735 ASSAY OF MAGNESIUM: CPT

## 2024-09-29 PROCEDURE — 0241U: CPT

## 2024-09-29 PROCEDURE — 71046 X-RAY EXAM CHEST 2 VIEWS: CPT | Mod: 26

## 2024-09-29 PROCEDURE — 99406 BEHAV CHNG SMOKING 3-10 MIN: CPT

## 2024-09-29 PROCEDURE — 36415 COLL VENOUS BLD VENIPUNCTURE: CPT

## 2024-09-29 PROCEDURE — 85025 COMPLETE CBC W/AUTO DIFF WBC: CPT

## 2024-09-29 RX ORDER — IPRATROPIUM BROMIDE AND ALBUTEROL SULFATE .5; 3 MG/3ML; MG/3ML
3 SOLUTION RESPIRATORY (INHALATION) ONCE
Refills: 0 | Status: COMPLETED | OUTPATIENT
Start: 2024-09-29 | End: 2024-09-29

## 2024-09-29 RX ORDER — METHYLPREDNISOLONE ACETATE 80 MG/ML
40 INJECTION, SUSPENSION INTRA-ARTICULAR; INTRALESIONAL; INTRAMUSCULAR; SOFT TISSUE EVERY 8 HOURS
Refills: 0 | Status: DISCONTINUED | OUTPATIENT
Start: 2024-09-29 | End: 2024-09-29

## 2024-09-29 RX ORDER — KETOROLAC TROMETHAMINE 10 MG/1
15 TABLET, FILM COATED ORAL ONCE
Refills: 0 | Status: DISCONTINUED | OUTPATIENT
Start: 2024-09-29 | End: 2024-09-29

## 2024-09-29 RX ORDER — ENOXAPARIN SODIUM 150 MG/ML
40 INJECTION SUBCUTANEOUS EVERY 24 HOURS
Refills: 0 | Status: DISCONTINUED | OUTPATIENT
Start: 2024-09-29 | End: 2024-10-01

## 2024-09-29 RX ORDER — MAGNESIUM SULFATE 500 MG/ML
2 VIAL (ML) INJECTION ONCE
Refills: 0 | Status: COMPLETED | OUTPATIENT
Start: 2024-09-29 | End: 2024-09-29

## 2024-09-29 RX ORDER — 5-HYDROXYTRYPTOPHAN (5-HTP) 100 MG
3 TABLET,DISINTEGRATING ORAL AT BEDTIME
Refills: 0 | Status: DISCONTINUED | OUTPATIENT
Start: 2024-09-29 | End: 2024-10-01

## 2024-09-29 RX ORDER — ACETAMINOPHEN 325 MG
650 TABLET ORAL EVERY 6 HOURS
Refills: 0 | Status: DISCONTINUED | OUTPATIENT
Start: 2024-09-29 | End: 2024-10-01

## 2024-09-29 RX ORDER — ALBUTEROL 90 MCG
2 AEROSOL (GRAM) INHALATION EVERY 6 HOURS
Refills: 0 | Status: DISCONTINUED | OUTPATIENT
Start: 2024-09-29 | End: 2024-10-01

## 2024-09-29 RX ORDER — MONTELUKAST SODIUM 10 MG/1
10 TABLET, FILM COATED ORAL DAILY
Refills: 0 | Status: DISCONTINUED | OUTPATIENT
Start: 2024-09-29 | End: 2024-10-01

## 2024-09-29 RX ORDER — METHYLPREDNISOLONE ACETATE 80 MG/ML
125 INJECTION, SUSPENSION INTRA-ARTICULAR; INTRALESIONAL; INTRAMUSCULAR; SOFT TISSUE ONCE
Refills: 0 | Status: COMPLETED | OUTPATIENT
Start: 2024-09-29 | End: 2024-09-29

## 2024-09-29 RX ORDER — PANTOPRAZOLE SODIUM 40 MG/1
40 TABLET, DELAYED RELEASE ORAL
Refills: 0 | Status: DISCONTINUED | OUTPATIENT
Start: 2024-09-29 | End: 2024-10-01

## 2024-09-29 RX ORDER — FLUTICASONE PROPION/SALMETEROL 100-50 MCG
1 BLISTER, WITH INHALATION DEVICE INHALATION
Refills: 0 | Status: DISCONTINUED | OUTPATIENT
Start: 2024-09-29 | End: 2024-10-01

## 2024-09-29 RX ORDER — IPRATROPIUM BROMIDE AND ALBUTEROL SULFATE .5; 3 MG/3ML; MG/3ML
3 SOLUTION RESPIRATORY (INHALATION) EVERY 6 HOURS
Refills: 0 | Status: DISCONTINUED | OUTPATIENT
Start: 2024-09-29 | End: 2024-10-01

## 2024-09-29 RX ORDER — METHYLPREDNISOLONE ACETATE 80 MG/ML
40 INJECTION, SUSPENSION INTRA-ARTICULAR; INTRALESIONAL; INTRAMUSCULAR; SOFT TISSUE EVERY 12 HOURS
Refills: 0 | Status: DISCONTINUED | OUTPATIENT
Start: 2024-09-29 | End: 2024-10-01

## 2024-09-29 RX ORDER — AZITHROMYCIN 250 MG/1
500 TABLET, FILM COATED ORAL EVERY 24 HOURS
Refills: 0 | Status: COMPLETED | OUTPATIENT
Start: 2024-09-29 | End: 2024-10-01

## 2024-09-29 RX ADMIN — Medication 150 GRAM(S): at 06:40

## 2024-09-29 RX ADMIN — Medication 1 DOSE(S): at 21:58

## 2024-09-29 RX ADMIN — METHYLPREDNISOLONE ACETATE 40 MILLIGRAM(S): 80 INJECTION, SUSPENSION INTRA-ARTICULAR; INTRALESIONAL; INTRAMUSCULAR; SOFT TISSUE at 17:20

## 2024-09-29 RX ADMIN — METHYLPREDNISOLONE ACETATE 40 MILLIGRAM(S): 80 INJECTION, SUSPENSION INTRA-ARTICULAR; INTRALESIONAL; INTRAMUSCULAR; SOFT TISSUE at 06:39

## 2024-09-29 RX ADMIN — MONTELUKAST SODIUM 10 MILLIGRAM(S): 10 TABLET, FILM COATED ORAL at 11:13

## 2024-09-29 RX ADMIN — METHYLPREDNISOLONE ACETATE 125 MILLIGRAM(S): 80 INJECTION, SUSPENSION INTRA-ARTICULAR; INTRALESIONAL; INTRAMUSCULAR; SOFT TISSUE at 02:50

## 2024-09-29 RX ADMIN — IPRATROPIUM BROMIDE AND ALBUTEROL SULFATE 3 MILLILITER(S): .5; 3 SOLUTION RESPIRATORY (INHALATION) at 02:50

## 2024-09-29 RX ADMIN — AZITHROMYCIN 500 MILLIGRAM(S): 250 TABLET, FILM COATED ORAL at 11:13

## 2024-09-29 RX ADMIN — KETOROLAC TROMETHAMINE 15 MILLIGRAM(S): 10 TABLET, FILM COATED ORAL at 11:43

## 2024-09-29 RX ADMIN — IPRATROPIUM BROMIDE AND ALBUTEROL SULFATE 3 MILLILITER(S): .5; 3 SOLUTION RESPIRATORY (INHALATION) at 09:40

## 2024-09-29 RX ADMIN — IPRATROPIUM BROMIDE AND ALBUTEROL SULFATE 3 MILLILITER(S): .5; 3 SOLUTION RESPIRATORY (INHALATION) at 21:41

## 2024-09-29 RX ADMIN — KETOROLAC TROMETHAMINE 15 MILLIGRAM(S): 10 TABLET, FILM COATED ORAL at 11:13

## 2024-09-29 RX ADMIN — IPRATROPIUM BROMIDE AND ALBUTEROL SULFATE 3 MILLILITER(S): .5; 3 SOLUTION RESPIRATORY (INHALATION) at 02:49

## 2024-09-29 RX ADMIN — IPRATROPIUM BROMIDE AND ALBUTEROL SULFATE 3 MILLILITER(S): .5; 3 SOLUTION RESPIRATORY (INHALATION) at 15:12

## 2024-09-29 NOTE — H&P ADULT - ASSESSMENT
Ms. Pedraza is a 54 yo F with PMH of COPD not on home O2, HTN, Hyperthyroidism presenting for acute exacerbation of COPD    #Asthma/COPD Exacerbation r/o Flu/COVID/RSV vs. Seasonal Triggers  - Not on home O2, progressive  SOB x1  - Uses Trelegy at home, states she has had fewer exacerbations w/ medication  - States exacerbations occur at start of Fall season  - No fevers or sick contacts  - No sepsis on admission  - Duonebs q6h  - Advair 1 puff BID  - Solumedrol 40mg IV BID  - Azithromycin 500mg PO x3d  - C/w home Montelukast  - Wean O2  - F/u Flu/COVID/RSV swab    #Hyperthyroidism  - C/w home Methimazole    #GERD  - C/w home Pantoprazole    #HTN  - No home antihypertensives  - Normotensive inpatient  - Monitor BP    #Misc  #Code Status: Full Code  #DVT ppx: Lovenox  #GI ppx: PPI  #Diet: Regular, DASH  #Activity: AAT  #Dispo: Med/Surg    Anticipated for D/C in 24 hours Ms. Pedraza is a 52 yo F with PMH of COPD not on home O2, HTN, Hyperthyroidism presenting for acute exacerbation of COPD    #Asthma/COPD Exacerbation  - r/o Flu/COVID/RSV vs. Seasonal Triggers, suspected non complaint pt still smoking   - Not on home O2, progressive  SOB x1  - Uses Trelegy at home, states she has had fewer exacerbations w/ medication  - States exacerbations occur at start of Fall season  - No fevers or sick contacts  - No sepsis on admission  - Duonebs q6h  - Advair 1 puff BID  - Solumedrol 40mg IV BID  - Azithromycin 500mg PO x3d  - C/w home Montelukast  - Wean O2  - F/u Flu/COVID/RSV swab    #Hyperthyroidism  - C/w home Methimazole    #GERD  - C/w home Pantoprazole    #HTN  - No home antihypertensives  - Normotensive inpatient  - Monitor BP    #current smoker-smoking cessation counseling done, spent 3 mins counceling     #Misc  #Code Status: Full Code  #DVT ppx: Lovenox  #GI ppx: PPI  #Diet: Regular, DASH  #Activity: AAT  #Dispo: Med/Surg    Anticipated for D/C in 24 hours

## 2024-09-29 NOTE — H&P ADULT - ATTENDING COMMENTS
HPI as above.  Interval history: Pt seen and examined at bedside. No cp or sob.   Vital Signs (24 Hrs):  T(C): 36.9 (09-29-24 @ 08:24), Max: 37.1 (09-29-24 @ 01:21)  HR: 92 (09-29-24 @ 08:24) (92 - 104)  BP: 128/78 (09-29-24 @ 08:24) (124/79 - 128/78)  RR: 18 (09-29-24 @ 08:24) (18 - 18)  SpO2: 99% (09-29-24 @ 08:24) (99% - 99%)  Wt(kg): --  Daily     Daily     I&O's Summary    PHYSICAL EXAM:  GENERAL: NAD, well-developed  HEAD:  Atraumatic, Normocephalic  EYES: EOMI, PERRLA, conjunctiva and sclera clear  NECK: Supple, No JVD  CHEST/LUNG: Clear to auscultation bilaterally; No wheeze  HEART: Regular rate and rhythm; No murmurs, rubs, or gallops  ABDOMEN: Soft, Nontender, Nondistended; Bowel sounds present  EXTREMITIES:  2+ Peripheral Pulses, No clubbing, cyanosis, or edema  PSYCH: AAOx3  NEUROLOGY: non-focal  SKIN: No rashes or lesions  Labs reviewed  Imaging reviewed independently and reviewed read  < from: Xray Chest 2 Views PA/Lat (09.29.24 @ 02:36) >    Impression:    No radiographic evidence of acute cardiopulmonary disease.    < end of copied text >    Plan as above. DW resident. Agree to plan. Made edits    #Progress Note Handoff  Pending (specify):    Family discussion: house staff updated pt family  Disposition:   Decision to admit the pt is based on acuity as above

## 2024-09-29 NOTE — ED PROVIDER NOTE - ATTENDING CONTRIBUTION TO CARE
52 yo F pmhx COPD not on home O2, HTN, hypothyroidism presents to ED for eval of SOB for past few days, worsening tonight, similar to previous COPD exacerbations. No fevers, cough, congestion, vomiting. Pt hypoxic to mid 80s on room air, improves with nasal cannula.     CONSTITUTIONAL: NAD.   SKIN: warm, dry  HEAD: Normocephalic; atraumatic.  ENT: MMM.   NECK: Supple.  CARD: RRR.   RESP: Diffuse wheezing, speaking full sentences, no increased WOB.   ABD: soft ntnd.   EXT: Normal ROM.  No lower extremity edema.   NEURO: Alert, oriented, grossly unremarkable.

## 2024-09-29 NOTE — ED PROVIDER NOTE - PHYSICAL EXAMINATION
GENERAL: Well-developed; well-nourished; in no acute distress. AO  SKIN: warm, well perfused  HEAD: Normocephalic; atraumatic.  EYES: no conjunctival erythema, ocular motions intact and appropriate  ENT: airway clear.  CARD: Regular rate and rhythm.   RESP: b/l wheezes  ABD: soft, nontender, and nondistended  Upper EXT: Normal ROM. Rad pulses 2+ symm, sensation intact and symm  Lower EXT: moving b/l LEs, sensation intact and symm

## 2024-09-29 NOTE — ED PROVIDER NOTE - DIFFERENTIAL DIAGNOSIS
The differential diagnosis for patients clinical presentation includes but is not limited to: copd exacerbation, uri, pneumonia Differential Diagnosis

## 2024-09-29 NOTE — H&P ADULT - HISTORY OF PRESENT ILLNESS
53-year-old female, past medical history of COPD not on home oxygen, hypertension, hyperthyroidism, comes in for shortness of breath for the past few days.  Patient states that worsening breathing prior to arrival, was not able to sleep, had 2 nebs treatment at home without improvement.  Does not take any steroids at home.  Denies fevers, chills, chest pain, vomiting, abdominal pain, calf pain.    Vitals  Temp: 98.8  HR: 104  BP: 124/79  O2: 99% on 2L NC  RR: 18    Labs  WBC: 5.90  HgB: 13.6  Lactate: 1.5    Imaging  CXR: No radiographic evidence of acute cardiopulmonary disease.    In the ED  Duonebs x3  Mag Sulfate 2g IV x1  Solumedrol 125mg IV x1 Ms. Pedraza is a 52 yo F with PMH of COPD not on home O2, HTN, Hyperthyroidism presenting for worsening SOB x1 week. She states she typically gets exacerbations of her COPD around the change of summer into fall. She denies fever, nausea, vomiting, chest pain, and abdominal pain. She states she has had some loose stools over the last 2 days but states she has not had any pain. She denies sick contacts. She is admitted to medicine for further management.    Vitals  Temp: 98.8  HR: 104  BP: 124/79  O2: 99% on 2L NC  RR: 18    Labs  WBC: 5.90  HgB: 13.6  Lactate: 1.5    Imaging  CXR: No radiographic evidence of acute cardiopulmonary disease.    In the ED  Duonebs x3  Mag Sulfate 2g IV x1  Solumedrol 125mg IV x1

## 2024-09-29 NOTE — ED PROVIDER NOTE - CLINICAL SUMMARY MEDICAL DECISION MAKING FREE TEXT BOX
Yes
52 yo F presented to ED with hypoxia and COPD exacerbation. Labs were ordered and reviewed.  Imaging was ordered and reviewed by me.  Appropriate medications for patient's presenting complaints were ordered and effects were reassessed.  Patient's records (prior hospital, ED visit, and/or nursing home notes if available) were reviewed.  Additional history was obtained from EMS, family, and/or PCP (where available).  Escalation to admission/observation was considered.  Patient requires inpatient hospitalization for further care.

## 2024-09-29 NOTE — ED PROVIDER NOTE - OBJECTIVE STATEMENT
53-year-old female, past medical history of COPD not on home oxygen, hypertension, hypothyroidism, comes in for shortness of breath for the past few days.  Patient states that worsening breathing prior to arrival, was not able to sleep, had 2 nebs treatment at home without improvement.  Does not take any steroids at home.  Denies fevers, chills, chest pain, vomiting, abdominal pain, calf pain.

## 2024-09-29 NOTE — H&P ADULT - NSHPLABSRESULTS_GEN_ALL_CORE
Spoke to patient in regards to abnormal labs.    CBC Full  -  ( 29 Sep 2024 03:01 )  WBC Count : 5.90 K/uL  Hemoglobin : 13.6 g/dL  Hematocrit : 40.2 %  Platelet Count - Automated : 303 K/uL  Mean Cell Volume : 97.3 fL  Mean Cell Hemoglobin : 32.9 pg  Mean Cell Hemoglobin Concentration : 33.8 g/dL  Auto Neutrophil # : 3.64 K/uL  Auto Lymphocyte # : 1.50 K/uL  Auto Monocyte # : 0.54 K/uL  Auto Eosinophil # : 0.17 K/uL  Auto Basophil # : 0.04 K/uL  Auto Neutrophil % : 61.6 %  Auto Lymphocyte % : 25.4 %  Auto Monocyte % : 9.2 %  Auto Eosinophil % : 2.9 %  Auto Basophil % : 0.7 %    BMP:    09-29 @ 03:01    Blood Urea Nitrogen - 6  Calcium - 9.3  Carbond Dioxide - 23  Chloride - 102  Creatinine - 0.7  Glucose - 124  Potassium - 3.6  Sodium - 139      Hemoglobin A1c -     Urine Culture:

## 2024-09-29 NOTE — H&P ADULT - NSHPPHYSICALEXAM_GEN_ALL_CORE
General: NAD  Head: NCAT  Neck: Supple, no JVD  Cardiac: Regular rate and rhythm  Pulmonary: Expiratory wheeze b/l  Abdominal: Soft, nontender  Extremities: No pitting edema  Neurologic: AOx3, nonfocal  Skin: No rashes or lesions

## 2024-09-29 NOTE — ED PROVIDER NOTE - CARE PLAN
Principal Discharge DX:	COPD exacerbation  Secondary Diagnosis:	Shortness of breath  Secondary Diagnosis:	Hypoxia   1 Principal Discharge DX:	COPD exacerbation  Assessment and plan of treatment:	Plan - labs vbg xr treatment reassess  Secondary Diagnosis:	Shortness of breath  Secondary Diagnosis:	Hypoxia

## 2024-09-29 NOTE — ED ADULT TRIAGE NOTE - CHIEF COMPLAINT QUOTE
pt bibems from home for COPD exacerbation. Took meds at home with no relief. En route given duonebs x1.

## 2024-09-30 LAB
ANION GAP SERPL CALC-SCNC: 12 MMOL/L — SIGNIFICANT CHANGE UP (ref 7–14)
BASOPHILS # BLD AUTO: 0.01 K/UL — SIGNIFICANT CHANGE UP (ref 0–0.2)
BASOPHILS NFR BLD AUTO: 0.2 % — SIGNIFICANT CHANGE UP (ref 0–1)
BUN SERPL-MCNC: 8 MG/DL — LOW (ref 10–20)
CALCIUM SERPL-MCNC: 10.2 MG/DL — SIGNIFICANT CHANGE UP (ref 8.4–10.5)
CHLORIDE SERPL-SCNC: 102 MMOL/L — SIGNIFICANT CHANGE UP (ref 98–110)
CO2 SERPL-SCNC: 28 MMOL/L — SIGNIFICANT CHANGE UP (ref 17–32)
CREAT SERPL-MCNC: 0.7 MG/DL — SIGNIFICANT CHANGE UP (ref 0.7–1.5)
EGFR: 102 ML/MIN/1.73M2 — SIGNIFICANT CHANGE UP
EOSINOPHIL # BLD AUTO: 0 K/UL — SIGNIFICANT CHANGE UP (ref 0–0.7)
EOSINOPHIL NFR BLD AUTO: 0 % — SIGNIFICANT CHANGE UP (ref 0–8)
FLUAV AG NPH QL: SIGNIFICANT CHANGE UP
FLUBV AG NPH QL: SIGNIFICANT CHANGE UP
GLUCOSE SERPL-MCNC: 92 MG/DL — SIGNIFICANT CHANGE UP (ref 70–99)
HCT VFR BLD CALC: 41.4 % — SIGNIFICANT CHANGE UP (ref 37–47)
HGB BLD-MCNC: 13.5 G/DL — SIGNIFICANT CHANGE UP (ref 12–16)
IMM GRANULOCYTES NFR BLD AUTO: 0.7 % — HIGH (ref 0.1–0.3)
LYMPHOCYTES # BLD AUTO: 0.73 K/UL — LOW (ref 1.2–3.4)
LYMPHOCYTES # BLD AUTO: 13.3 % — LOW (ref 20.5–51.1)
MAGNESIUM SERPL-MCNC: 1.8 MG/DL — SIGNIFICANT CHANGE UP (ref 1.8–2.4)
MCHC RBC-ENTMCNC: 32.6 G/DL — SIGNIFICANT CHANGE UP (ref 32–37)
MCHC RBC-ENTMCNC: 32.7 PG — HIGH (ref 27–31)
MCV RBC AUTO: 100.2 FL — HIGH (ref 81–99)
MONOCYTES # BLD AUTO: 0.17 K/UL — SIGNIFICANT CHANGE UP (ref 0.1–0.6)
MONOCYTES NFR BLD AUTO: 3.1 % — SIGNIFICANT CHANGE UP (ref 1.7–9.3)
NEUTROPHILS # BLD AUTO: 4.53 K/UL — SIGNIFICANT CHANGE UP (ref 1.4–6.5)
NEUTROPHILS NFR BLD AUTO: 82.7 % — HIGH (ref 42.2–75.2)
NRBC # BLD: 0 /100 WBCS — SIGNIFICANT CHANGE UP (ref 0–0)
PLATELET # BLD AUTO: 300 K/UL — SIGNIFICANT CHANGE UP (ref 130–400)
PMV BLD: 10.3 FL — SIGNIFICANT CHANGE UP (ref 7.4–10.4)
POTASSIUM SERPL-MCNC: 4.6 MMOL/L — SIGNIFICANT CHANGE UP (ref 3.5–5)
POTASSIUM SERPL-SCNC: 4.6 MMOL/L — SIGNIFICANT CHANGE UP (ref 3.5–5)
RBC # BLD: 4.13 M/UL — LOW (ref 4.2–5.4)
RBC # FLD: 12.6 % — SIGNIFICANT CHANGE UP (ref 11.5–14.5)
RSV RNA NPH QL NAA+NON-PROBE: SIGNIFICANT CHANGE UP
SARS-COV-2 RNA SPEC QL NAA+PROBE: SIGNIFICANT CHANGE UP
SODIUM SERPL-SCNC: 142 MMOL/L — SIGNIFICANT CHANGE UP (ref 135–146)
WBC # BLD: 5.48 K/UL — SIGNIFICANT CHANGE UP (ref 4.8–10.8)
WBC # FLD AUTO: 5.48 K/UL — SIGNIFICANT CHANGE UP (ref 4.8–10.8)

## 2024-09-30 PROCEDURE — 99232 SBSQ HOSP IP/OBS MODERATE 35: CPT

## 2024-09-30 RX ADMIN — AZITHROMYCIN 500 MILLIGRAM(S): 250 TABLET, FILM COATED ORAL at 08:43

## 2024-09-30 RX ADMIN — Medication 650 MILLIGRAM(S): at 09:18

## 2024-09-30 RX ADMIN — PANTOPRAZOLE SODIUM 40 MILLIGRAM(S): 40 TABLET, DELAYED RELEASE ORAL at 05:53

## 2024-09-30 RX ADMIN — IPRATROPIUM BROMIDE AND ALBUTEROL SULFATE 3 MILLILITER(S): .5; 3 SOLUTION RESPIRATORY (INHALATION) at 08:26

## 2024-09-30 RX ADMIN — IPRATROPIUM BROMIDE AND ALBUTEROL SULFATE 3 MILLILITER(S): .5; 3 SOLUTION RESPIRATORY (INHALATION) at 13:24

## 2024-09-30 RX ADMIN — Medication 650 MILLIGRAM(S): at 08:48

## 2024-09-30 RX ADMIN — MONTELUKAST SODIUM 10 MILLIGRAM(S): 10 TABLET, FILM COATED ORAL at 13:28

## 2024-09-30 RX ADMIN — METHYLPREDNISOLONE ACETATE 40 MILLIGRAM(S): 80 INJECTION, SUSPENSION INTRA-ARTICULAR; INTRALESIONAL; INTRAMUSCULAR; SOFT TISSUE at 05:53

## 2024-09-30 RX ADMIN — METHYLPREDNISOLONE ACETATE 40 MILLIGRAM(S): 80 INJECTION, SUSPENSION INTRA-ARTICULAR; INTRALESIONAL; INTRAMUSCULAR; SOFT TISSUE at 17:43

## 2024-09-30 RX ADMIN — IPRATROPIUM BROMIDE AND ALBUTEROL SULFATE 3 MILLILITER(S): .5; 3 SOLUTION RESPIRATORY (INHALATION) at 20:03

## 2024-09-30 NOTE — PROGRESS NOTE ADULT - ASSESSMENT
Ms. Pedraza is a 52 yo F with PMH of COPD not on home O2 and hyperthyroidism presenting for acute exacerbation of COPD.    #Asthma/COPD Exacerbation  - r/o Flu/COVID/RSV vs. Seasonal Triggers, suspected non compliant pt still smoking   - Not on home O2, progressive SOB x1 week  - Uses Trelegy at home, states she has had fewer exacerbations w/ medication  - States exacerbations occur at start of Fall season  - No fevers or sick contacts  - No sepsis on admission  - Duonebs q6h  - Advair 1 puff BID  - Solumedrol 40mg IV BID  - Azithromycin 500mg PO x3d  - C/w home Montelukast  - Wean O2  - Flu/COVID/RSV swab pending  - ? send pt home w/ O2    #Hyperthyroidism  - C/w home Methimazole    #GERD  - C/w home Pantoprazole    #current smoker-smoking cessation counseling done    #Misc  #Code Status: Full Code  #DVT ppx: Lovenox  #GI ppx: PPI  #Diet: Regular, DASH  #Activity: AAT  #Dispo: Med/Surg   Ms. Pedraza is a 54 yo F with PMH of COPD not on home O2 and hyperthyroidism presenting for acute exacerbation of COPD.    #Asthma/COPD Exacerbation  - r/o Flu/COVID/RSV vs. Seasonal Triggers, suspected non compliant pt still smoking   - Not on home O2, progressive SOB x1 week  - Uses Trelegy at home, states she has had fewer exacerbations w/ medication but it did not help this time  - States exacerbations occur at start of Fall season  - No fevers or sick contacts  - No sepsis on admission  - Duonebs q6h  - Advair 1 puff BID  - Solumedrol 40mg IV BID  - Azithromycin 500mg PO x3d  - C/w home Montelukast  - Wean O2, currently on 2L NC  - Flu/COVID/RSV swab pending  - ? send home w/ O2    #Hyperthyroidism  - C/w home Methimazole    #GERD  - C/w home Pantoprazole    #current smoker-smoking cessation counseling done  - smokes 5-6 cigarettes daily  - actively trying to wean herself off     #Misc  #Code Status: Full Code  #DVT ppx: Lovenox  #GI ppx: PPI  #Diet: Regular, DASH  #Activity: AAT  #Dispo: Med/Surg   Ms. Pedraza is a 52 yo F with PMH of COPD not on home O2 and hyperthyroidism presenting for acute exacerbation of COPD.    #Asthma/COPD Exacerbation  - active smoker   - Not on home O2, progressive SOB x1 week  - Uses Trelegy at home, states she has had fewer exacerbations w/ medication but it did not help this time  - States exacerbations occur at start of Fall season  - No fevers or sick contacts  - No sepsis on admission  - Duonebs q6h  - Advair 1 puff BID  - Solumedrol 40mg IV BID  - Azithromycin 500mg PO x3d  - C/w home Montelukast  - Wean O2, currently on 2L NC  - Flu/COVID/RSV NEGATIVE  - ? send home w/ O2    #Hyperthyroidism  - C/w home Methimazole    #GERD  - C/w home Pantoprazole    #current smoker-smoking cessation counseling done  - smokes 5-6 cigarettes daily  - actively trying to wean herself off     #Misc  #Code Status: Full Code  #DVT ppx: Lovenox  #GI ppx: PPI  #Diet: Regular, DASH  #Activity: AAT  #Dispo: Med/Surg

## 2024-09-30 NOTE — PROGRESS NOTE ADULT - ATTENDING COMMENTS
52 yo F with PMH of COPD not on home O2 and hyperthyroidism presenting for acute exacerbation of COPD.      #Acute hypoxemic respiratory failure 2/2 Asthma/COPD Overlap Exacerbation  #Active Smoker   - No fevers or sick contacts  - No sepsis on admission  - CXR negative   Plan:   - Duonebs q6h  - Advair BID  - Solumedrol 40mg IV BID  - r/o Flu/COVID/RSV swab   - Azithromycin 500mg PO x3d  - C/w home Montelukast  - Wean O2, currently on 2L NC    #Hyperthyroidism  - C/w home Methimazole    #GERD  - C/w home Pantoprazole       #DVT ppx: Lovenox     Pending: clinical improvement

## 2024-09-30 NOTE — PROGRESS NOTE ADULT - SUBJECTIVE AND OBJECTIVE BOX
SUBJECTIVE/OVERNIGHT EVENTS  Today is hospital day 1d. This morning patient was seen and examined at bedside. No acute or major events overnight. Complains of persistent cough with clear sputum. States she can't lie down because of her cough. SOB unchanged.    MEDICATIONS  STANDING MEDICATIONS  albuterol/ipratropium for Nebulization 3 milliLiter(s) Nebulizer every 6 hours  azithromycin   Tablet 500 milliGRAM(s) Oral every 24 hours  enoxaparin Injectable 40 milliGRAM(s) SubCutaneous every 24 hours  fluticasone propionate/ salmeterol 100-50 MICROgram(s) Diskus 1 Dose(s) Inhalation two times a day  methimazole 5 milliGRAM(s) Oral daily  methylPREDNISolone sodium succinate Injectable 40 milliGRAM(s) IV Push every 12 hours  montelukast 10 milliGRAM(s) Oral daily  pantoprazole    Tablet 40 milliGRAM(s) Oral before breakfast    PRN MEDICATIONS  acetaminophen     Tablet .. 650 milliGRAM(s) Oral every 6 hours PRN  albuterol    90 MICROgram(s) HFA Inhaler 2 Puff(s) Inhalation every 6 hours PRN  melatonin 3 milliGRAM(s) Oral at bedtime PRN    VITALS  T(F): 98.3 (09-30-24 @ 06:02), Max: 98.3 (09-30-24 @ 06:02)  HR: 79 (09-30-24 @ 06:02) (79 - 92)  BP: 130/80 (09-30-24 @ 06:02) (113/77 - 148/82)  RR: 18 (09-30-24 @ 06:02) (18 - 18)  SpO2: 96% (09-30-24 @ 00:15) (93% - 100%)    PHYSICAL EXAM  GENERAL: NAD, well-developed  HEAD:  Atraumatic, Normocephalic  EYES: conjunctiva and sclera clear  NECK: Supple, No JVD  CHEST/LUNG: + wheeze bilaterally expiratory.  HEART: Regular rate and rhythm; No murmurs, rubs, or gallops  ABDOMEN: Soft, Nontender, Nondistended; Bowel sounds present  EXTREMITIES:  2+ Peripheral Pulses, No clubbing, cyanosis, or edema  PSYCH: AAOx3  SKIN: No rashes or lesions      LABS             13.6   5.90  )-----------( 303      ( 09-29-24 @ 03:01 )             40.2     139  |  102  |  6   -------------------------<  124   09-29-24 @ 03:01  3.6  |  23  |  0.7    Ca      9.3     09-29-24 @ 03:01    TPro  7.2  /  Alb  4.5  /  TBili  0.4  /  DBili  x   /  AST  16  /  ALT  12  /  AlkPhos  68  /  GGT  x     09-29-24 @ 03:01        Urinalysis Basic - ( 29 Sep 2024 03:01 )    Color: x / Appearance: x / SG: x / pH: x  Gluc: 124 mg/dL / Ketone: x  / Bili: x / Urobili: x   Blood: x / Protein: x / Nitrite: x   Leuk Esterase: x / RBC: x / WBC x   Sq Epi: x / Non Sq Epi: x / Bacteria: x          IMAGING  9/29/2024 Radiographic evidence of acute cardiopulmonary disease.   SUBJECTIVE/OVERNIGHT EVENTS  Today is hospital day 1d. This morning patient was seen and examined at bedside. No acute or major events overnight. Complains of persistent cough with clear sputum. States she can't lie down because of her cough. SOB unchanged.    MEDICATIONS  STANDING MEDICATIONS  albuterol/ipratropium for Nebulization 3 milliLiter(s) Nebulizer every 6 hours  azithromycin   Tablet 500 milliGRAM(s) Oral every 24 hours  enoxaparin Injectable 40 milliGRAM(s) SubCutaneous every 24 hours  fluticasone propionate/ salmeterol 100-50 MICROgram(s) Diskus 1 Dose(s) Inhalation two times a day  methimazole 5 milliGRAM(s) Oral daily  methylPREDNISolone sodium succinate Injectable 40 milliGRAM(s) IV Push every 12 hours  montelukast 10 milliGRAM(s) Oral daily  pantoprazole    Tablet 40 milliGRAM(s) Oral before breakfast    PRN MEDICATIONS  acetaminophen     Tablet .. 650 milliGRAM(s) Oral every 6 hours PRN  albuterol    90 MICROgram(s) HFA Inhaler 2 Puff(s) Inhalation every 6 hours PRN  melatonin 3 milliGRAM(s) Oral at bedtime PRN    VITALS  T(F): 98.3 (09-30-24 @ 06:02), Max: 98.3 (09-30-24 @ 06:02)  HR: 79 (09-30-24 @ 06:02) (79 - 92)  BP: 130/80 (09-30-24 @ 06:02) (113/77 - 148/82)  RR: 18 (09-30-24 @ 06:02) (18 - 18)  SpO2: 96% (09-30-24 @ 00:15) (93% - 100%)    PHYSICAL EXAM  GENERAL: NAD, well-developed  HEAD:  Atraumatic, Normocephalic  EYES: conjunctiva and sclera clear  NECK: Supple, No JVD  CHEST/LUNG: + expiratory wheeze bilaterally  HEART: Regular rate and rhythm; No murmurs, rubs, or gallops  ABDOMEN: Soft, Nontender, Nondistended; Bowel sounds present  EXTREMITIES:  2+ Peripheral Pulses, No clubbing, cyanosis, or edema  PSYCH: AAOx3  SKIN: No rashes or lesions      LABS             13.6   5.90  )-----------( 303      ( 09-29-24 @ 03:01 )             40.2     139  |  102  |  6   -------------------------<  124   09-29-24 @ 03:01  3.6  |  23  |  0.7    Ca      9.3     09-29-24 @ 03:01    TPro  7.2  /  Alb  4.5  /  TBili  0.4  /  DBili  x   /  AST  16  /  ALT  12  /  AlkPhos  68  /  GGT  x     09-29-24 @ 03:01        Urinalysis Basic - ( 29 Sep 2024 03:01 )    Color: x / Appearance: x / SG: x / pH: x  Gluc: 124 mg/dL / Ketone: x  / Bili: x / Urobili: x   Blood: x / Protein: x / Nitrite: x   Leuk Esterase: x / RBC: x / WBC x   Sq Epi: x / Non Sq Epi: x / Bacteria: x          IMAGING  9/29/2024 Radiographic evidence of acute cardiopulmonary disease.   SUBJECTIVE/OVERNIGHT EVENTS  Today is hospital day 1d. This morning patient was seen and examined at bedside. No acute or major events overnight. Complains of persistent cough with clear sputum. States she can't lie down because of her cough. SOB unchanged.    MEDICATIONS  STANDING MEDICATIONS  albuterol/ipratropium for Nebulization 3 milliLiter(s) Nebulizer every 6 hours  azithromycin   Tablet 500 milliGRAM(s) Oral every 24 hours  enoxaparin Injectable 40 milliGRAM(s) SubCutaneous every 24 hours  fluticasone propionate/ salmeterol 100-50 MICROgram(s) Diskus 1 Dose(s) Inhalation two times a day  methimazole 5 milliGRAM(s) Oral daily  methylPREDNISolone sodium succinate Injectable 40 milliGRAM(s) IV Push every 12 hours  montelukast 10 milliGRAM(s) Oral daily  pantoprazole    Tablet 40 milliGRAM(s) Oral before breakfast    PRN MEDICATIONS  acetaminophen     Tablet .. 650 milliGRAM(s) Oral every 6 hours PRN  albuterol    90 MICROgram(s) HFA Inhaler 2 Puff(s) Inhalation every 6 hours PRN  melatonin 3 milliGRAM(s) Oral at bedtime PRN    VITALS  T(F): 98.3 (09-30-24 @ 06:02), Max: 98.3 (09-30-24 @ 06:02)  HR: 79 (09-30-24 @ 06:02) (79 - 92)  BP: 130/80 (09-30-24 @ 06:02) (113/77 - 148/82)  RR: 18 (09-30-24 @ 06:02) (18 - 18)  SpO2: 96% (09-30-24 @ 00:15) (93% - 100%)    PHYSICAL EXAM  GENERAL: NAD, well-developed  HEAD:  Atraumatic, Normocephalic  EYES: conjunctiva and sclera clear  NECK: Supple, No JVD  CHEST/LUNG: + expiratory wheeze bilaterally  HEART: Regular rate and rhythm; No murmurs, rubs, or gallops  ABDOMEN: Soft, Nontender, Nondistended; Bowel sounds present  EXTREMITIES:  2+ Peripheral Pulses, No clubbing, cyanosis, or edema  PSYCH: AAOx3  SKIN: No rashes or lesions      LABS             13.6   5.90  )-----------( 303      ( 09-29-24 @ 03:01 )             40.2     139  |  102  |  6   -------------------------<  124   09-29-24 @ 03:01  3.6  |  23  |  0.7    Ca      9.3     09-29-24 @ 03:01    TPro  7.2  /  Alb  4.5  /  TBili  0.4  /  DBili  x   /  AST  16  /  ALT  12  /  AlkPhos  68  /  GGT  x     09-29-24 @ 03:01        Urinalysis Basic - ( 29 Sep 2024 03:01 )    Color: x / Appearance: x / SG: x / pH: x  Gluc: 124 mg/dL / Ketone: x  / Bili: x / Urobili: x   Blood: x / Protein: x / Nitrite: x   Leuk Esterase: x / RBC: x / WBC x   Sq Epi: x / Non Sq Epi: x / Bacteria: x          IMAGING  9/29/2024 No radiographic evidence of acute cardiopulmonary disease.

## 2024-09-30 NOTE — PATIENT PROFILE ADULT - FUNCTIONAL SCREEN CURRENT LEVEL: COMMUNICATION, MLM
Patient is scheduled for surgery. Zohaib LAGUNAS has questions regarding patient Warfarin. He is asking if patient will need to be bridged.    
0 = understands/communicates without difficulty

## 2024-09-30 NOTE — PATIENT PROFILE ADULT - FALL HARM RISK - HARM RISK INTERVENTIONS

## 2024-10-01 ENCOUNTER — TRANSCRIPTION ENCOUNTER (OUTPATIENT)
Age: 54
End: 2024-10-01

## 2024-10-01 VITALS
OXYGEN SATURATION: 97 % | DIASTOLIC BLOOD PRESSURE: 79 MMHG | RESPIRATION RATE: 18 BRPM | SYSTOLIC BLOOD PRESSURE: 122 MMHG | HEART RATE: 98 BPM | TEMPERATURE: 98 F

## 2024-10-01 LAB
ANION GAP SERPL CALC-SCNC: 14 MMOL/L — SIGNIFICANT CHANGE UP (ref 7–14)
BASOPHILS # BLD AUTO: 0.01 K/UL — SIGNIFICANT CHANGE UP (ref 0–0.2)
BASOPHILS NFR BLD AUTO: 0.1 % — SIGNIFICANT CHANGE UP (ref 0–1)
BUN SERPL-MCNC: 8 MG/DL — LOW (ref 10–20)
CALCIUM SERPL-MCNC: 10 MG/DL — SIGNIFICANT CHANGE UP (ref 8.4–10.5)
CHLORIDE SERPL-SCNC: 103 MMOL/L — SIGNIFICANT CHANGE UP (ref 98–110)
CO2 SERPL-SCNC: 27 MMOL/L — SIGNIFICANT CHANGE UP (ref 17–32)
CREAT SERPL-MCNC: 0.6 MG/DL — LOW (ref 0.7–1.5)
EGFR: 106 ML/MIN/1.73M2 — SIGNIFICANT CHANGE UP
EOSINOPHIL # BLD AUTO: 0.01 K/UL — SIGNIFICANT CHANGE UP (ref 0–0.7)
EOSINOPHIL NFR BLD AUTO: 0.1 % — SIGNIFICANT CHANGE UP (ref 0–8)
GLUCOSE SERPL-MCNC: 154 MG/DL — HIGH (ref 70–99)
HCT VFR BLD CALC: 40 % — SIGNIFICANT CHANGE UP (ref 37–47)
HGB BLD-MCNC: 12.9 G/DL — SIGNIFICANT CHANGE UP (ref 12–16)
IMM GRANULOCYTES NFR BLD AUTO: 0.4 % — HIGH (ref 0.1–0.3)
LYMPHOCYTES # BLD AUTO: 1.07 K/UL — LOW (ref 1.2–3.4)
LYMPHOCYTES # BLD AUTO: 16 % — LOW (ref 20.5–51.1)
MAGNESIUM SERPL-MCNC: 1.6 MG/DL — LOW (ref 1.8–2.4)
MCHC RBC-ENTMCNC: 32.3 G/DL — SIGNIFICANT CHANGE UP (ref 32–37)
MCHC RBC-ENTMCNC: 32.3 PG — HIGH (ref 27–31)
MCV RBC AUTO: 100.3 FL — HIGH (ref 81–99)
MONOCYTES # BLD AUTO: 0.17 K/UL — SIGNIFICANT CHANGE UP (ref 0.1–0.6)
MONOCYTES NFR BLD AUTO: 2.5 % — SIGNIFICANT CHANGE UP (ref 1.7–9.3)
NEUTROPHILS # BLD AUTO: 5.4 K/UL — SIGNIFICANT CHANGE UP (ref 1.4–6.5)
NEUTROPHILS NFR BLD AUTO: 80.9 % — HIGH (ref 42.2–75.2)
NRBC # BLD: 0 /100 WBCS — SIGNIFICANT CHANGE UP (ref 0–0)
PLATELET # BLD AUTO: 291 K/UL — SIGNIFICANT CHANGE UP (ref 130–400)
PMV BLD: 9.7 FL — SIGNIFICANT CHANGE UP (ref 7.4–10.4)
POTASSIUM SERPL-MCNC: 4.1 MMOL/L — SIGNIFICANT CHANGE UP (ref 3.5–5)
POTASSIUM SERPL-SCNC: 4.1 MMOL/L — SIGNIFICANT CHANGE UP (ref 3.5–5)
RBC # BLD: 3.99 M/UL — LOW (ref 4.2–5.4)
RBC # FLD: 12.5 % — SIGNIFICANT CHANGE UP (ref 11.5–14.5)
SODIUM SERPL-SCNC: 144 MMOL/L — SIGNIFICANT CHANGE UP (ref 135–146)
WBC # BLD: 6.69 K/UL — SIGNIFICANT CHANGE UP (ref 4.8–10.8)
WBC # FLD AUTO: 6.69 K/UL — SIGNIFICANT CHANGE UP (ref 4.8–10.8)

## 2024-10-01 PROCEDURE — 99239 HOSP IP/OBS DSCHRG MGMT >30: CPT

## 2024-10-01 RX ORDER — PREDNISONE 5 MG/1
2 TABLET ORAL
Qty: 10 | Refills: 0
Start: 2024-10-01 | End: 2024-10-05

## 2024-10-01 RX ORDER — MAGNESIUM SULFATE 500 MG/ML
2 VIAL (ML) INJECTION ONCE
Refills: 0 | Status: COMPLETED | OUTPATIENT
Start: 2024-10-01 | End: 2024-10-01

## 2024-10-01 RX ORDER — PREDNISONE 5 MG/1
40 TABLET ORAL DAILY
Refills: 0 | Status: DISCONTINUED | OUTPATIENT
Start: 2024-10-02 | End: 2024-10-01

## 2024-10-01 RX ORDER — PREDNISONE 5 MG/1
2.5 TABLET ORAL
Qty: 12.5 | Refills: 0
Start: 2024-10-01 | End: 2024-10-05

## 2024-10-01 RX ORDER — FLUTICASONE FUROATE, UMECLIDINIUM BROMIDE AND VILANTEROL TRIFENATATE 100; 62.5; 25 UG/1; UG/1; UG/1
1 POWDER RESPIRATORY (INHALATION)
Qty: 1 | Refills: 0
Start: 2024-10-01 | End: 2024-10-30

## 2024-10-01 RX ORDER — CHLORHEXIDINE GLUCONATE ORAL RINSE 1.2 MG/ML
1 SOLUTION DENTAL DAILY
Refills: 0 | Status: DISCONTINUED | OUTPATIENT
Start: 2024-10-01 | End: 2024-10-01

## 2024-10-01 RX ORDER — MONTELUKAST SODIUM 10 MG/1
1 TABLET, FILM COATED ORAL
Qty: 14 | Refills: 0
Start: 2024-10-01 | End: 2024-10-14

## 2024-10-01 RX ADMIN — MONTELUKAST SODIUM 10 MILLIGRAM(S): 10 TABLET, FILM COATED ORAL at 12:56

## 2024-10-01 RX ADMIN — PANTOPRAZOLE SODIUM 40 MILLIGRAM(S): 40 TABLET, DELAYED RELEASE ORAL at 05:15

## 2024-10-01 RX ADMIN — AZITHROMYCIN 500 MILLIGRAM(S): 250 TABLET, FILM COATED ORAL at 09:43

## 2024-10-01 RX ADMIN — IPRATROPIUM BROMIDE AND ALBUTEROL SULFATE 3 MILLILITER(S): .5; 3 SOLUTION RESPIRATORY (INHALATION) at 08:17

## 2024-10-01 RX ADMIN — IPRATROPIUM BROMIDE AND ALBUTEROL SULFATE 3 MILLILITER(S): .5; 3 SOLUTION RESPIRATORY (INHALATION) at 01:09

## 2024-10-01 RX ADMIN — Medication 25 GRAM(S): at 12:56

## 2024-10-01 RX ADMIN — METHYLPREDNISOLONE ACETATE 40 MILLIGRAM(S): 80 INJECTION, SUSPENSION INTRA-ARTICULAR; INTRALESIONAL; INTRAMUSCULAR; SOFT TISSUE at 05:16

## 2024-10-01 RX ADMIN — IPRATROPIUM BROMIDE AND ALBUTEROL SULFATE 3 MILLILITER(S): .5; 3 SOLUTION RESPIRATORY (INHALATION) at 13:07

## 2024-10-01 NOTE — DISCHARGE NOTE PROVIDER - PROVIDER TOKENS
PROVIDER:[TOKEN:[[id number]],FOLLOWUP:[2 weeks]] PROVIDER:[TOKEN:[[id number]],FOLLOWUP:[2 weeks]],PROVIDER:[TOKEN:[[id number]],FOLLOWUP:[2 weeks]]

## 2024-10-01 NOTE — PROGRESS NOTE ADULT - SUBJECTIVE AND OBJECTIVE BOX
ANDREW FISCHER  53y Female    INTERVAL HPI/OVERNIGHT EVENTS:    pt feels better - ambulating and pulse ox was 96%  less SOB and cough  no pain, fever  comfortable in going home today on prednisone 50mg daily    T(F): 97.7 (10-01-24 @ 13:14), Max: 98.8 (09-30-24 @ 19:15)  HR: 98 (10-01-24 @ 13:14) (72 - 98)  BP: 122/79 (10-01-24 @ 13:14) (122/79 - 130/80)  RR: 18 (10-01-24 @ 13:14) (18 - 18)  SpO2: 97% (10-01-24 @ 13:14) (96% - 97%)    PHYSICAL EXAM:  GENERAL: NAD  HEAD:  Normocephalic  EYES:  conjunctiva and sclera clear  ENMT: Moist mucous membranes  NERVOUS SYSTEM:  Alert, awake, Good concentration  CHEST/LUNG: CTA b/l; No wheezing  HEART: Regular rate and rhythm  ABDOMEN: Soft, Nontender, Nondistended, small hernia  EXTREMITIES:   No edema LE  SKIN: warm, dry    Consultant(s) Notes Reviewed:  [x ] YES  [ ] NO  Care Discussed with Consultants/Other Providers [ x] YES  [ ] NO    MEDICATIONS  (STANDING):  albuterol/ipratropium for Nebulization 3 milliLiter(s) Nebulizer every 6 hours  chlorhexidine 2% Cloths 1 Application(s) Topical daily  enoxaparin Injectable 40 milliGRAM(s) SubCutaneous every 24 hours  fluticasone propionate/ salmeterol 100-50 MICROgram(s) Diskus 1 Dose(s) Inhalation two times a day  methimazole 5 milliGRAM(s) Oral daily  montelukast 10 milliGRAM(s) Oral daily  pantoprazole    Tablet 40 milliGRAM(s) Oral before breakfast    MEDICATIONS  (PRN):  acetaminophen     Tablet .. 650 milliGRAM(s) Oral every 6 hours PRN Temp greater or equal to 38C (100.4F), Mild Pain (1 - 3)  albuterol    90 MICROgram(s) HFA Inhaler 2 Puff(s) Inhalation every 6 hours PRN Shortness of Breath and/or Wheezing  melatonin 3 milliGRAM(s) Oral at bedtime PRN Insomnia      LABS:                        12.9   6.69  )-----------( 291      ( 01 Oct 2024 07:56 )             40.0     10-01    144  |  103  |  8[L]  ----------------------------<  154[H]  4.1   |  27  |  0.6[L]    Ca    10.0      01 Oct 2024 07:56  Mg     1.6     10-01                  RADIOLOGY & ADDITIONAL TESTS:    Imaging report Personally Reviewed:  [ x] YES  [ ] NO    < from: Xray Chest 2 Views PA/Lat (09.29.24 @ 02:36) >  Impression:    No radiographic evidence of acute cardiopulmonary disease.      < end of copied text >              Case discussed with residents and RN on rounds today    Care discussed with pt

## 2024-10-01 NOTE — DISCHARGE NOTE PROVIDER - HOSPITAL COURSE
Ms. Pedraza is a 54 yo F with PMH of COPD, active smoker, not on home O2 and hyperthyroidism presenting for acute exacerbation of COPD.    #Acute hypoxemic respiratory failure 2/2 Asthma/COPD Overlap Exacerbation  #Active Smoker   - No fevers or sick contacts  - No sepsis on admission  - Flu/COVID/RSV swab negative  - CXR negative   - Duonebs q6h  - Advair BID  - Solumedrol 40mg IV BID  - Azithromycin 500mg PO x3d  - C/w home Montelukast  - sating at 96% on RA    #Hyperthyroidism  - C/w home Methimazole    #GERD  - C/w home Pantoprazole   Ms. Pedraza is a 52 yo F with PMH of COPD, active smoker, not on home O2 and hyperthyroidism presenting for acute exacerbation of COPD.    #Acute hypoxemic respiratory failure 2/2 Asthma/COPD Overlap Exacerbation  #Active Smoker   - No fevers or sick contacts  - No sepsis on admission  - Flu/COVID/RSV swab negative  - CXR negative   - Duonebs q6h  - Advair BID  - s/p Solumedrol 40mg IV BID  - dc on prednisone 50mg QD x5 days  - s/p Azithromycin 500mg PO x3d  - C/w home Montelukast  - satting at 96% on RA at rest and ambulating    #Hyperthyroidism  - C/w home Methimazole    #GERD  - C/w home Pantoprazole    Discussion of discharge plan of care, including discharge diagnoses, medication reconciliation, and follow-ups was conducted with Dr. Dubose on 10/1/24, and discharge was approved.

## 2024-10-01 NOTE — DISCHARGE NOTE PROVIDER - CARE PROVIDER_API CALL
Louis Dinh  Internal Medicine  [address] [place] [id number]  Phone: [phone]  Fax: [phone]  Follow Up Time: 2 weeks   Louis Dinh  Internal Medicine  [address] [place] [address]  Phone: [phone]  Fax: [phone]  Follow Up Time: 2 weeks    Abel Ramos  Pulmonary Disease  [address] [place] [address]  Phone: [phone]  Fax: [phone]  Follow Up Time: 2 weeks

## 2024-10-01 NOTE — PROGRESS NOTE ADULT - ASSESSMENT
54 y/o woman with PMH of COPD not on home O2, HTN and Hyperthyroidism presented for worsening SOB x1 week. She states she typically gets exacerbations of her COPD around the change of summer into fall.    1. COPD exacerbation possibly exacerbated by change in weather and active tobacco use disorder  RSV/COVID/flu negative  CXR NAPD  improved with solumedrol and nebs  pulse ox now 96% on RA and after ambulation  pt is clinically improved  discussed smoking cessation with pt and she will continue cutting down on discharge  labs, CXR reviewed by me  discharge on prednisone x 5 days   outpt f/u with PMD and pulmonary at [facility]    2. Hyperthyroidism on methimazole      med rec and discharge papers reviewed by me  previous notes reviewed by me  spent 45 minutes on the discharge process of this pt

## 2024-10-01 NOTE — DISCHARGE NOTE PROVIDER - CARE PROVIDERS DIRECT ADDRESSES
[email or web address]

## 2024-10-01 NOTE — DISCHARGE NOTE NURSING/CASE MANAGEMENT/SOCIAL WORK - PATIENT PORTAL LINK FT
You can access the FollowMyHealth Patient Portal offered by [facility] by registering at the following website: [email or web address]. By joining [employer]’s FollowMyHealth portal, you will also be able to view your health information using other applications (apps) compatible with our system.

## 2024-10-01 NOTE — DISCHARGE NOTE PROVIDER - NSDCMRMEDTOKEN_GEN_ALL_CORE_FT
albuterol 2.5 mg/3 mL (0.083%) inhalation solution: 3 milliliter(s) by nebulizer every 4 hours, As Needed for wheezing  methIMAzole 5 mg oral tablet: 1 tab(s) orally once a day   montelukast 10 mg oral tablet: 1 tab(s) orally once a day   pantoprazole 40 mg oral delayed release tablet: 1 tab(s) orally once a day   Trelegy Ellipta 100 mcg-62.5 mcg-25 mcg/inh inhalation powder: 1 puff(s) inhaled once a day   albuterol 2.5 mg/3 mL (0.083%) inhalation solution: 3 milliliter(s) by nebulizer every 4 hours, As Needed for wheezing  methIMAzole 5 mg oral tablet: 1 tab(s) orally once a day   montelukast 10 mg oral tablet: 1 tab(s) orally once a day   pantoprazole 40 mg oral delayed release tablet: 1 tab(s) orally once a day   predniSONE 20 mg oral tablet: 2 tab(s) orally once a day  Trelegy Ellipta 100 mcg-62.5 mcg-25 mcg/inh inhalation powder: 1 puff(s) inhaled once a day   albuterol 2.5 mg/3 mL (0.083%) inhalation solution: 3 milliliter(s) by nebulizer every 4 hours, As Needed for wheezing  methIMAzole 5 mg oral tablet: 1 tab(s) orally once a day   montelukast 10 mg oral tablet: 1 tab(s) orally once a day  pantoprazole 40 mg oral delayed release tablet: 1 tab(s) orally once a day   predniSONE 20 mg oral tablet: 2.5 tab(s) orally once a day  Trelegy Ellipta 100 mcg-62.5 mcg-25 mcg/inh inhalation powder: 1 puff(s) inhaled once a day

## 2024-10-01 NOTE — DISCHARGE NOTE PROVIDER - NSDCCPCAREPLAN_GEN_ALL_CORE_FT
PRINCIPAL DISCHARGE DIAGNOSIS  Diagnosis: COPD exacerbation  Assessment and Plan of Treatment: You were admitted to the hospital for shortness of breath. Your symptoms improved with steroids and breathing treatments. You were counseled on quitting smoking. Please follow up with your primary care provider in 2 weeks. Please return to the hospital if you experience any worsening symptoms.      SECONDARY DISCHARGE DIAGNOSES  Diagnosis: Shortness of breath  Assessment and Plan of Treatment:     Diagnosis: Hypoxia  Assessment and Plan of Treatment:      PRINCIPAL DISCHARGE DIAGNOSIS  Diagnosis: COPD exacerbation  Assessment and Plan of Treatment: You were admitted to the hospital for shortness of breath. Your symptoms improved with steroids and breathing treatments. You were counseled on quitting smoking. Please follow up with your primary care provider in 2 weeks. Please return to the hospital if you experience any worsening symptoms.     PRINCIPAL DISCHARGE DIAGNOSIS  Diagnosis: COPD exacerbation  Assessment and Plan of Treatment: You were admitted to the hospital for shortness of breath due to COPD exacerbation. Your symptoms improved with steroids and nebulizers. You were counseled on quitting smoking. Please follow up with your primary care provider in 2 weeks. Please take medications as instructed. Please return to the hospital if you experience any new or worsening symptoms.     PRINCIPAL DISCHARGE DIAGNOSIS  Diagnosis: COPD exacerbation  Assessment and Plan of Treatment: You were admitted to the hospital for shortness of breath due to COPD exacerbation. Your symptoms improved with steroids and nebulizers. You were counseled on quitting smoking. Please follow up with pulmonology and your primary care provider in 2 weeks. Please take medications as instructed. Please return to the hospital if you experience any new or worsening symptoms.

## 2024-10-08 DIAGNOSIS — Z72.0 TOBACCO USE: ICD-10-CM

## 2024-10-08 DIAGNOSIS — J44.1 CHRONIC OBSTRUCTIVE PULMONARY DISEASE WITH (ACUTE) EXACERBATION: ICD-10-CM

## 2024-10-08 DIAGNOSIS — E05.90 THYROTOXICOSIS, UNSPECIFIED WITHOUT THYROTOXIC CRISIS OR STORM: ICD-10-CM

## 2024-10-08 DIAGNOSIS — I10 ESSENTIAL (PRIMARY) HYPERTENSION: ICD-10-CM

## 2024-10-08 DIAGNOSIS — R06.02 SHORTNESS OF BREATH: ICD-10-CM

## 2024-10-08 DIAGNOSIS — J96.01 ACUTE RESPIRATORY FAILURE WITH HYPOXIA: ICD-10-CM

## 2024-10-08 DIAGNOSIS — K21.9 GASTRO-ESOPHAGEAL REFLUX DISEASE WITHOUT ESOPHAGITIS: ICD-10-CM

## 2024-10-08 DIAGNOSIS — Z91.041 RADIOGRAPHIC DYE ALLERGY STATUS: ICD-10-CM

## 2024-11-20 ENCOUNTER — OUTPATIENT (OUTPATIENT)
Dept: OUTPATIENT SERVICES | Facility: HOSPITAL | Age: 54
LOS: 1 days | End: 2024-11-20
Payer: COMMERCIAL

## 2024-11-20 DIAGNOSIS — K01.1 IMPACTED TEETH: ICD-10-CM

## 2024-11-20 DIAGNOSIS — K46.9 UNSPECIFIED ABDOMINAL HERNIA WITHOUT OBSTRUCTION OR GANGRENE: Chronic | ICD-10-CM

## 2024-11-20 PROCEDURE — D7140: CPT

## 2024-11-27 NOTE — DISCHARGE NOTE NURSING/CASE MANAGEMENT/SOCIAL WORK - NSDPACMPNY_GEN_ALL_CORE
Current patient location: [address] [place] [address]   [facility] [address]    Is the patient currently in the state of [place]? YES    Visit mode:VIDEO    If the visit is dropped, the patient can be reconnected by:VIDEO VISIT: Text to cell phone:   Telephone Information:   Mobile [phone]       Will anyone else be joining the visit? NO  (If patient encounters technical issues they should call [phone]    Are changes needed to the allergy or medication list? No, pt made needed adjustments    Are refills needed on medications prescribed by this physician? NO    Rooming Documentation:  Not applicable    Reason for visit: Consult (NEW UROLOGY)    Yael Miranda VVF    Depression Response    Patient declined to continue to the phq9 today stating she is in the process of setting up her mental health care right now          
Traveling alone

## 2024-12-10 ENCOUNTER — APPOINTMENT (OUTPATIENT)
Dept: PULMONOLOGY | Facility: CLINIC | Age: 54
End: 2024-12-10

## 2024-12-11 ENCOUNTER — NON-APPOINTMENT (OUTPATIENT)
Age: 54
End: 2024-12-11

## 2024-12-14 ENCOUNTER — NON-APPOINTMENT (OUTPATIENT)
Age: 54
End: 2024-12-14

## 2025-01-14 ENCOUNTER — APPOINTMENT (OUTPATIENT)
Dept: INTERNAL MEDICINE | Facility: CLINIC | Age: 55
End: 2025-01-14
Payer: MEDICAID

## 2025-01-14 ENCOUNTER — OUTPATIENT (OUTPATIENT)
Dept: OUTPATIENT SERVICES | Facility: HOSPITAL | Age: 55
LOS: 1 days | End: 2025-01-14
Payer: MEDICAID

## 2025-01-14 VITALS
BODY MASS INDEX: 20.16 KG/M2 | OXYGEN SATURATION: 95 % | HEIGHT: 65 IN | TEMPERATURE: 98.2 F | WEIGHT: 121 LBS | SYSTOLIC BLOOD PRESSURE: 122 MMHG | HEART RATE: 88 BPM | DIASTOLIC BLOOD PRESSURE: 82 MMHG

## 2025-01-14 DIAGNOSIS — Z00.00 ENCOUNTER FOR GENERAL ADULT MEDICAL EXAMINATION WITHOUT ABNORMAL FINDINGS: ICD-10-CM

## 2025-01-14 DIAGNOSIS — K21.9 GASTRO-ESOPHAGEAL REFLUX DISEASE W/OUT ESOPHAGITIS: ICD-10-CM

## 2025-01-14 DIAGNOSIS — K46.9 UNSPECIFIED ABDOMINAL HERNIA WITHOUT OBSTRUCTION OR GANGRENE: Chronic | ICD-10-CM

## 2025-01-14 DIAGNOSIS — Z00.00 ENCOUNTER FOR GENERAL ADULT MEDICAL EXAMINATION W/OUT ABNORMAL FINDINGS: ICD-10-CM

## 2025-01-14 DIAGNOSIS — N32.81 OVERACTIVE BLADDER: ICD-10-CM

## 2025-01-14 DIAGNOSIS — E05.90 THYROTOXICOSIS, UNSPECIFIED W/OUT THYROTOXIC CRISIS OR STORM: ICD-10-CM

## 2025-01-14 PROCEDURE — 99214 OFFICE O/P EST MOD 30 MIN: CPT

## 2025-01-14 RX ORDER — PREDNISONE 50 MG/1
50 TABLET ORAL DAILY
Qty: 5 | Refills: 0 | Status: ACTIVE | COMMUNITY
Start: 2025-01-14 | End: 1900-01-01

## 2025-01-16 DIAGNOSIS — K21.9 GASTRO-ESOPHAGEAL REFLUX DISEASE WITHOUT ESOPHAGITIS: ICD-10-CM

## 2025-01-16 DIAGNOSIS — N32.81 OVERACTIVE BLADDER: ICD-10-CM

## 2025-01-16 DIAGNOSIS — E05.90 THYROTOXICOSIS, UNSPECIFIED WITHOUT THYROTOXIC CRISIS OR STORM: ICD-10-CM

## 2025-01-16 DIAGNOSIS — Z00.00 ENCOUNTER FOR GENERAL ADULT MEDICAL EXAMINATION WITHOUT ABNORMAL FINDINGS: ICD-10-CM

## 2025-01-17 ENCOUNTER — LABORATORY RESULT (OUTPATIENT)
Age: 55
End: 2025-01-17

## 2025-01-17 ENCOUNTER — OUTPATIENT (OUTPATIENT)
Dept: OUTPATIENT SERVICES | Facility: HOSPITAL | Age: 55
LOS: 1 days | End: 2025-01-17
Payer: MEDICAID

## 2025-01-17 DIAGNOSIS — K46.9 UNSPECIFIED ABDOMINAL HERNIA WITHOUT OBSTRUCTION OR GANGRENE: Chronic | ICD-10-CM

## 2025-01-17 DIAGNOSIS — Z00.00 ENCOUNTER FOR GENERAL ADULT MEDICAL EXAMINATION WITHOUT ABNORMAL FINDINGS: ICD-10-CM

## 2025-01-17 LAB
25(OH)D3 SERPL-MCNC: 38 NG/ML
ALBUMIN SERPL ELPH-MCNC: 4.6 G/DL
ALP BLD-CCNC: 73 U/L
ALT SERPL-CCNC: 16 U/L
ANION GAP SERPL CALC-SCNC: 13 MMOL/L
AST SERPL-CCNC: 19 U/L
BASOPHILS # BLD AUTO: 0.02 K/UL
BASOPHILS NFR BLD AUTO: 0.3 %
BILIRUB SERPL-MCNC: 0.3 MG/DL
BUN SERPL-MCNC: 7 MG/DL
CALCIUM SERPL-MCNC: 10.3 MG/DL
CHLORIDE SERPL-SCNC: 98 MMOL/L
CHOLEST SERPL-MCNC: 185 MG/DL
CO2 SERPL-SCNC: 27 MMOL/L
CREAT SERPL-MCNC: 0.7 MG/DL
EGFR: 101 ML/MIN/1.73M2
EOSINOPHIL # BLD AUTO: 0 K/UL
EOSINOPHIL NFR BLD AUTO: 0 %
ESTIMATED AVERAGE GLUCOSE: 105 MG/DL
GLUCOSE SERPL-MCNC: 89 MG/DL
HBA1C MFR BLD HPLC: 5.3 %
HCT VFR BLD CALC: 40.5 %
HDLC SERPL-MCNC: 121 MG/DL
HGB BLD-MCNC: 13.4 G/DL
IMM GRANULOCYTES NFR BLD AUTO: 0.7 %
LDLC SERPL CALC-MCNC: 44 MG/DL
LYMPHOCYTES # BLD AUTO: 1 K/UL
LYMPHOCYTES NFR BLD AUTO: 14.8 %
MAN DIFF?: NORMAL
MCHC RBC-ENTMCNC: 32.5 PG
MCHC RBC-ENTMCNC: 33.1 G/DL
MCV RBC AUTO: 98.3 FL
MONOCYTES # BLD AUTO: 0.26 K/UL
MONOCYTES NFR BLD AUTO: 3.8 %
NEUTROPHILS # BLD AUTO: 5.44 K/UL
NEUTROPHILS NFR BLD AUTO: 80.4 %
NONHDLC SERPL-MCNC: 64 MG/DL
PLATELET # BLD AUTO: 318 K/UL
PMV BLD AUTO: 0 /100 WBCS
POTASSIUM SERPL-SCNC: 5.3 MMOL/L
PROT SERPL-MCNC: 7.7 G/DL
RBC # BLD: 4.12 M/UL
RBC # FLD: 12.9 %
SODIUM SERPL-SCNC: 138 MMOL/L
TRIGL SERPL-MCNC: 126 MG/DL
TSH SERPL-ACNC: 0.17 UIU/ML
WBC # FLD AUTO: 6.77 K/UL

## 2025-01-17 PROCEDURE — 82306 VITAMIN D 25 HYDROXY: CPT

## 2025-01-17 PROCEDURE — 80053 COMPREHEN METABOLIC PANEL: CPT

## 2025-01-17 PROCEDURE — 85025 COMPLETE CBC W/AUTO DIFF WBC: CPT

## 2025-01-17 PROCEDURE — 80061 LIPID PANEL: CPT

## 2025-01-17 PROCEDURE — 84439 ASSAY OF FREE THYROXINE: CPT

## 2025-01-17 PROCEDURE — 83036 HEMOGLOBIN GLYCOSYLATED A1C: CPT

## 2025-01-17 PROCEDURE — 36415 COLL VENOUS BLD VENIPUNCTURE: CPT

## 2025-01-17 PROCEDURE — 84443 ASSAY THYROID STIM HORMONE: CPT

## 2025-01-18 DIAGNOSIS — Z00.00 ENCOUNTER FOR GENERAL ADULT MEDICAL EXAMINATION WITHOUT ABNORMAL FINDINGS: ICD-10-CM

## 2025-01-22 ENCOUNTER — OUTPATIENT (OUTPATIENT)
Dept: OUTPATIENT SERVICES | Facility: HOSPITAL | Age: 55
LOS: 1 days | End: 2025-01-22
Payer: COMMERCIAL

## 2025-01-22 DIAGNOSIS — K46.9 UNSPECIFIED ABDOMINAL HERNIA WITHOUT OBSTRUCTION OR GANGRENE: Chronic | ICD-10-CM

## 2025-01-22 DIAGNOSIS — K01.1 IMPACTED TEETH: ICD-10-CM

## 2025-01-22 PROCEDURE — D7140: CPT

## 2025-01-23 DIAGNOSIS — K02.63 DENTAL CARIES ON SMOOTH SURFACE PENETRATING INTO PULP: ICD-10-CM

## 2025-02-19 ENCOUNTER — OUTPATIENT (OUTPATIENT)
Dept: OUTPATIENT SERVICES | Facility: HOSPITAL | Age: 55
LOS: 1 days | End: 2025-02-19
Payer: COMMERCIAL

## 2025-02-19 DIAGNOSIS — K08.409 PARTIAL LOSS OF TEETH, UNSPECIFIED CAUSE, UNSPECIFIED CLASS: ICD-10-CM

## 2025-02-19 DIAGNOSIS — K46.9 UNSPECIFIED ABDOMINAL HERNIA WITHOUT OBSTRUCTION OR GANGRENE: Chronic | ICD-10-CM

## 2025-02-19 PROCEDURE — D9310: CPT

## 2025-02-20 DIAGNOSIS — K08.109 COMPLETE LOSS OF TEETH, UNSPECIFIED CAUSE, UNSPECIFIED CLASS: ICD-10-CM

## 2025-02-28 ENCOUNTER — APPOINTMENT (OUTPATIENT)
Dept: GASTROENTEROLOGY | Facility: CLINIC | Age: 55
End: 2025-02-28

## 2025-03-06 NOTE — ED ADULT NURSE NOTE - NSFALLRSKASSESASSIST_ED_ALL_ED
Stable    Continue Cymbalta 120 mg daily to control depressive symptoms and anxiety symptoms  Orders:    DULoxetine (CYMBALTA) 60 mg delayed release capsule; Take 2 capsules (120 mg total) by mouth daily     no

## 2025-03-19 ENCOUNTER — OUTPATIENT (OUTPATIENT)
Dept: OUTPATIENT SERVICES | Facility: HOSPITAL | Age: 55
LOS: 1 days | End: 2025-03-19
Payer: COMMERCIAL

## 2025-03-19 DIAGNOSIS — K46.9 UNSPECIFIED ABDOMINAL HERNIA WITHOUT OBSTRUCTION OR GANGRENE: Chronic | ICD-10-CM

## 2025-03-19 DIAGNOSIS — K08.409 PARTIAL LOSS OF TEETH, UNSPECIFIED CAUSE, UNSPECIFIED CLASS: ICD-10-CM

## 2025-03-19 PROCEDURE — D7320: CPT

## 2025-03-20 DIAGNOSIS — K08.109 COMPLETE LOSS OF TEETH, UNSPECIFIED CAUSE, UNSPECIFIED CLASS: ICD-10-CM

## 2025-03-20 DIAGNOSIS — K08.409 PARTIAL LOSS OF TEETH, UNSPECIFIED CAUSE, UNSPECIFIED CLASS: ICD-10-CM

## 2025-04-02 ENCOUNTER — NON-APPOINTMENT (OUTPATIENT)
Age: 55
End: 2025-04-02

## 2025-04-02 ENCOUNTER — OUTPATIENT (OUTPATIENT)
Dept: OUTPATIENT SERVICES | Facility: HOSPITAL | Age: 55
LOS: 1 days | End: 2025-04-02
Payer: COMMERCIAL

## 2025-04-02 DIAGNOSIS — K46.9 UNSPECIFIED ABDOMINAL HERNIA WITHOUT OBSTRUCTION OR GANGRENE: Chronic | ICD-10-CM

## 2025-04-02 DIAGNOSIS — K08.109 COMPLETE LOSS OF TEETH, UNSPECIFIED CAUSE, UNSPECIFIED CLASS: ICD-10-CM

## 2025-04-02 PROCEDURE — D0170: CPT

## 2025-04-03 DIAGNOSIS — K08.109 COMPLETE LOSS OF TEETH, UNSPECIFIED CAUSE, UNSPECIFIED CLASS: ICD-10-CM

## 2025-04-23 NOTE — ED PROVIDER NOTE - CCCP TRG CHIEF CMPLNT
A catheter was exchanged for a (CATHETER DX 100CM 6FR .056IN DXTERITY JL 3.5 CRV INSLIDE LG) catheter. cough/fever

## 2025-04-27 NOTE — ED ADULT NURSE NOTE - CAS DISCH ACCOMP BY
Wt Readings from Last 3 Encounters:   04/27/25 73.8 kg (162 lb 11.2 oz)   04/24/25 76.8 kg (169 lb 5 oz)   04/24/25 76.8 kg (169 lb 6.4 oz)              Self/Family

## 2025-05-21 ENCOUNTER — OUTPATIENT (OUTPATIENT)
Dept: OUTPATIENT SERVICES | Facility: HOSPITAL | Age: 55
LOS: 1 days | End: 2025-05-21

## 2025-05-21 DIAGNOSIS — K08.109 COMPLETE LOSS OF TEETH, UNSPECIFIED CAUSE, UNSPECIFIED CLASS: ICD-10-CM

## 2025-05-21 DIAGNOSIS — K08.409 PARTIAL LOSS OF TEETH, UNSPECIFIED CAUSE, UNSPECIFIED CLASS: ICD-10-CM

## 2025-05-21 DIAGNOSIS — K46.9 UNSPECIFIED ABDOMINAL HERNIA WITHOUT OBSTRUCTION OR GANGRENE: Chronic | ICD-10-CM

## 2025-05-21 PROCEDURE — D5120: CPT

## 2025-05-21 PROCEDURE — D0170: CPT

## 2025-05-21 PROCEDURE — D5110: CPT

## 2025-05-22 ENCOUNTER — EMERGENCY (EMERGENCY)
Facility: HOSPITAL | Age: 55
LOS: 0 days | Discharge: HOME | End: 2025-05-22
Attending: EMERGENCY MEDICINE
Payer: MEDICAID

## 2025-05-22 VITALS
WEIGHT: 117.95 LBS | RESPIRATION RATE: 26 BRPM | TEMPERATURE: 97 F | HEART RATE: 97 BPM | DIASTOLIC BLOOD PRESSURE: 74 MMHG | OXYGEN SATURATION: 98 % | SYSTOLIC BLOOD PRESSURE: 132 MMHG

## 2025-05-22 DIAGNOSIS — J44.1 CHRONIC OBSTRUCTIVE PULMONARY DISEASE WITH (ACUTE) EXACERBATION: ICD-10-CM

## 2025-05-22 DIAGNOSIS — E05.90 THYROTOXICOSIS, UNSPECIFIED WITHOUT THYROTOXIC CRISIS OR STORM: ICD-10-CM

## 2025-05-22 DIAGNOSIS — Z91.041 RADIOGRAPHIC DYE ALLERGY STATUS: ICD-10-CM

## 2025-05-22 DIAGNOSIS — K08.409 PARTIAL LOSS OF TEETH, UNSPECIFIED CAUSE, UNSPECIFIED CLASS: ICD-10-CM

## 2025-05-22 DIAGNOSIS — F17.200 NICOTINE DEPENDENCE, UNSPECIFIED, UNCOMPLICATED: ICD-10-CM

## 2025-05-22 DIAGNOSIS — K46.9 UNSPECIFIED ABDOMINAL HERNIA WITHOUT OBSTRUCTION OR GANGRENE: Chronic | ICD-10-CM

## 2025-05-22 DIAGNOSIS — K21.9 GASTRO-ESOPHAGEAL REFLUX DISEASE WITHOUT ESOPHAGITIS: ICD-10-CM

## 2025-05-22 DIAGNOSIS — R06.02 SHORTNESS OF BREATH: ICD-10-CM

## 2025-05-22 DIAGNOSIS — K08.109 COMPLETE LOSS OF TEETH, UNSPECIFIED CAUSE, UNSPECIFIED CLASS: ICD-10-CM

## 2025-05-22 LAB
ALBUMIN SERPL ELPH-MCNC: 4.1 G/DL — SIGNIFICANT CHANGE UP (ref 3.5–5.2)
ALP SERPL-CCNC: 68 U/L — SIGNIFICANT CHANGE UP (ref 30–115)
ALT FLD-CCNC: 15 U/L — SIGNIFICANT CHANGE UP (ref 0–41)
ANION GAP SERPL CALC-SCNC: 8 MMOL/L — SIGNIFICANT CHANGE UP (ref 7–14)
AST SERPL-CCNC: 26 U/L — SIGNIFICANT CHANGE UP (ref 0–41)
BASOPHILS # BLD AUTO: 0.04 K/UL — SIGNIFICANT CHANGE UP (ref 0–0.2)
BASOPHILS NFR BLD AUTO: 0.8 % — SIGNIFICANT CHANGE UP (ref 0–1)
BILIRUB SERPL-MCNC: 0.3 MG/DL — SIGNIFICANT CHANGE UP (ref 0.2–1.2)
BUN SERPL-MCNC: 6 MG/DL — LOW (ref 10–20)
CALCIUM SERPL-MCNC: 9.1 MG/DL — SIGNIFICANT CHANGE UP (ref 8.4–10.5)
CHLORIDE SERPL-SCNC: 103 MMOL/L — SIGNIFICANT CHANGE UP (ref 98–110)
CO2 SERPL-SCNC: 28 MMOL/L — SIGNIFICANT CHANGE UP (ref 17–32)
CREAT SERPL-MCNC: 0.7 MG/DL — SIGNIFICANT CHANGE UP (ref 0.7–1.5)
EGFR: 101 ML/MIN/1.73M2 — SIGNIFICANT CHANGE UP
EGFR: 101 ML/MIN/1.73M2 — SIGNIFICANT CHANGE UP
EOSINOPHIL # BLD AUTO: 0.45 K/UL — SIGNIFICANT CHANGE UP (ref 0–0.7)
EOSINOPHIL NFR BLD AUTO: 8.5 % — HIGH (ref 0–8)
GAS PNL BLDV: SIGNIFICANT CHANGE UP
GLUCOSE SERPL-MCNC: 127 MG/DL — HIGH (ref 70–99)
HCT VFR BLD CALC: 41.5 % — SIGNIFICANT CHANGE UP (ref 37–47)
HGB BLD-MCNC: 13.7 G/DL — SIGNIFICANT CHANGE UP (ref 12–16)
IMM GRANULOCYTES NFR BLD AUTO: 0.4 % — HIGH (ref 0.1–0.3)
LYMPHOCYTES # BLD AUTO: 1.56 K/UL — SIGNIFICANT CHANGE UP (ref 1.2–3.4)
LYMPHOCYTES # BLD AUTO: 29.6 % — SIGNIFICANT CHANGE UP (ref 20.5–51.1)
MCHC RBC-ENTMCNC: 32.1 PG — HIGH (ref 27–31)
MCHC RBC-ENTMCNC: 33 G/DL — SIGNIFICANT CHANGE UP (ref 32–37)
MCV RBC AUTO: 97.2 FL — SIGNIFICANT CHANGE UP (ref 81–99)
MONOCYTES # BLD AUTO: 0.24 K/UL — SIGNIFICANT CHANGE UP (ref 0.1–0.6)
MONOCYTES NFR BLD AUTO: 4.6 % — SIGNIFICANT CHANGE UP (ref 1.7–9.3)
NEUTROPHILS # BLD AUTO: 2.96 K/UL — SIGNIFICANT CHANGE UP (ref 1.4–6.5)
NEUTROPHILS NFR BLD AUTO: 56.1 % — SIGNIFICANT CHANGE UP (ref 42.2–75.2)
NRBC BLD AUTO-RTO: 0 /100 WBCS — SIGNIFICANT CHANGE UP (ref 0–0)
PLATELET # BLD AUTO: 252 K/UL — SIGNIFICANT CHANGE UP (ref 130–400)
PMV BLD: 10.9 FL — HIGH (ref 7.4–10.4)
POTASSIUM SERPL-MCNC: 4.9 MMOL/L — SIGNIFICANT CHANGE UP (ref 3.5–5)
POTASSIUM SERPL-SCNC: 4.9 MMOL/L — SIGNIFICANT CHANGE UP (ref 3.5–5)
PROT SERPL-MCNC: 7.7 G/DL — SIGNIFICANT CHANGE UP (ref 6–8)
RBC # BLD: 4.27 M/UL — SIGNIFICANT CHANGE UP (ref 4.2–5.4)
RBC # FLD: 13.2 % — SIGNIFICANT CHANGE UP (ref 11.5–14.5)
SODIUM SERPL-SCNC: 139 MMOL/L — SIGNIFICANT CHANGE UP (ref 135–146)
WBC # BLD: 5.27 K/UL — SIGNIFICANT CHANGE UP (ref 4.8–10.8)
WBC # FLD AUTO: 5.27 K/UL — SIGNIFICANT CHANGE UP (ref 4.8–10.8)

## 2025-05-22 PROCEDURE — 99285 EMERGENCY DEPT VISIT HI MDM: CPT

## 2025-05-22 PROCEDURE — 85014 HEMATOCRIT: CPT

## 2025-05-22 PROCEDURE — 94640 AIRWAY INHALATION TREATMENT: CPT

## 2025-05-22 PROCEDURE — 71045 X-RAY EXAM CHEST 1 VIEW: CPT | Mod: 26

## 2025-05-22 PROCEDURE — 93005 ELECTROCARDIOGRAM TRACING: CPT

## 2025-05-22 PROCEDURE — 93010 ELECTROCARDIOGRAM REPORT: CPT

## 2025-05-22 PROCEDURE — 85018 HEMOGLOBIN: CPT

## 2025-05-22 PROCEDURE — 99285 EMERGENCY DEPT VISIT HI MDM: CPT | Mod: 25

## 2025-05-22 PROCEDURE — 84132 ASSAY OF SERUM POTASSIUM: CPT

## 2025-05-22 PROCEDURE — 36415 COLL VENOUS BLD VENIPUNCTURE: CPT

## 2025-05-22 PROCEDURE — 82803 BLOOD GASES ANY COMBINATION: CPT

## 2025-05-22 PROCEDURE — 80053 COMPREHEN METABOLIC PANEL: CPT

## 2025-05-22 PROCEDURE — 83605 ASSAY OF LACTIC ACID: CPT

## 2025-05-22 PROCEDURE — 71045 X-RAY EXAM CHEST 1 VIEW: CPT

## 2025-05-22 PROCEDURE — 82330 ASSAY OF CALCIUM: CPT

## 2025-05-22 PROCEDURE — 84295 ASSAY OF SERUM SODIUM: CPT

## 2025-05-22 PROCEDURE — 85025 COMPLETE CBC W/AUTO DIFF WBC: CPT

## 2025-05-22 RX ORDER — IPRATROPIUM BROMIDE AND ALBUTEROL SULFATE .5; 2.5 MG/3ML; MG/3ML
3 SOLUTION RESPIRATORY (INHALATION) ONCE
Refills: 0 | Status: COMPLETED | OUTPATIENT
Start: 2025-05-22 | End: 2025-05-22

## 2025-05-22 RX ORDER — PREDNISONE 20 MG/1
1 TABLET ORAL
Qty: 4 | Refills: 0
Start: 2025-05-22 | End: 2025-05-25

## 2025-05-22 RX ADMIN — IPRATROPIUM BROMIDE AND ALBUTEROL SULFATE 3 MILLILITER(S): .5; 2.5 SOLUTION RESPIRATORY (INHALATION) at 12:56

## 2025-05-22 NOTE — ED PROVIDER NOTE - PROGRESS NOTE DETAILS
Hua Majano: on reev pt states her sx are significantly improved. wheeze improved. stable for dc at this time.

## 2025-05-22 NOTE — ED PROVIDER NOTE - CLINICAL SUMMARY MEDICAL DECISION MAKING FREE TEXT BOX
54-year-old female past medical history as documented, COPD, active smoker complaining of shortness of breath since last night.  Patient with rhonchi and wheezing on exam bilaterally.  All labs reviewed.  VBG reviewed.  Chest x-ray with no acute pathology.  Patient proved clinically in the ED with treatment for COPD exacerbation.  Lungs clear.  Repeat vitals noted.  Patient discharged to follow-up with her pulmonologist and given return instructions.

## 2025-05-22 NOTE — ED PROVIDER NOTE - NSFOLLOWUPINSTRUCTIONS_ED_ALL_ED_FT
COPD: Prevent Exacerbations    AMBULATORY CARE:    An exacerbation of COPD means your symptoms get much worse very quickly and can become life-threatening. Exacerbations can be triggered by infections such as a cold or the flu. Lung irritants such as air pollution, dust, fumes, or smoke can also be triggers.    Go to pulmonary rehabilitation (rehab) if directed: Rehab is a program run by specialists who help you learn to manage COPD. Examples include a pulmonologist (lung specialist), dietitian, or exercise therapist. The specialists will help you make a plan to avoid triggers that cause an exacerbation.    Protect yourself from germs: Germs can get into your lungs and cause an infection. An infection in your lungs can create more mucus and make it harder to breathe. An infection can also create swelling in your airway and prevent air from getting in. You can decrease your risk for infection by doing the following:    Wash your hands often. Wash your hands several times each day. Wash after you use the bathroom, change a child's diaper, and before you prepare or eat food. Wash your hands for at least 20 seconds. Rinse with warm, running water. Then dry your hands with a clean towel or paper towel. Use hand  that contains alcohol if soap and water are not available.  Handwashing      Always cover your mouth when you cough. Cough into a tissue or your shirtsleeve so you do not spread germs from your hands.    Try to avoid people who have a cold or the flu. If you are sick, stay away from others as much as possible.    Ask about vaccines you may need. Your healthcare provider may recommend these and other vaccines:  Ask your healthcare provider about the flu and pneumonia vaccines. All adults should get the flu (influenza) vaccine as soon as recommended each year, usually in September or October. The pneumonia vaccine is recommended for all adults aged 50 or older to prevent pneumococcal disease, such as pneumonia. Adults aged 19 to 49 years who are at high risk for pneumococcal disease should also receive the vaccine. You may need 1 dose or 2. The number depends on the vaccine used and your risk factors.    COVID-19 vaccines are given to adults as a shot. At least 1 dose of an updated vaccine is recommended for all adults. COVID-19 vaccines are updated throughout the year. Adults 65 or older need a second dose of updated vaccine at least 4 months after the first dose. Your healthcare provider can help you schedule all needed doses as updated vaccines become available.  Do not smoke: Nicotine and other chemicals in cigarettes and cigars can cause lung damage and make your COPD worse. Ask your healthcare provider for information if you currently smoke and need help to quit. E-cigarettes or smokeless tobacco still contain nicotine. Talk to your healthcare provider before you use these products.    Avoid secondhand smoke: This is smoke another person exhales. Even if you have never smoked or have quit, it is important to avoid secondhand smoke. This smoke can also cause lung damage or trigger an exacerbation.    Use pursed-lip breathing any time you feel short of breath:    Breathe in through your nose. Use the muscles in your abdomen to help fill your lungs with air.    Slowly breathe out through your mouth with your lips slightly puckered. You should make a quiet hissing sound as you breathe out.    Try to take 2 times as long to breathe out as to breathe in. This helps you get rid of as much air from your lungs as possible.    Repeat this exercise several times. When you are used to doing pursed-lip breathing, you can do it any time you need more air.  Breathe in Breathe out  Take your medicines as directed: Your healthcare provider may prescribe medicine to help you breathe easier. It is important that you take your medicines as directed to prevent or stop an exacerbation. Refill your medicines before you are out so that you do not miss a dose. Talk to your healthcare provider or pharmacist if you have any questions about how to take your medicines.    Short-acting bronchodilators may be called rescue inhalers or relievers. They relieve sudden, severe symptoms and start to work right away.    Long-acting bronchodilators may be called controllers or maintenance medicine. This medicine helps open your airway over time. It is used to prevent breathing problems. Long-acting bronchodilators should not be used to treat sudden, severe symptoms, such as trouble breathing.  Avoid anything that makes your symptoms worse: Stay out of high altitudes and places with high humidity. Stay inside, or cover your mouth and nose with a scarf when you are outside in cold weather. Stay inside on days when air pollution or pollen counts are high. Do not use aerosol sprays, such as deodorant, bug spray, and hairspray.    Exercise daily: Exercise for at least 20 minutes each day to help increase your energy and decrease shortness of breath. Talk to your healthcare provider about the best exercise plan for you.  Walking for Exercise    Drink liquids as directed: You may need to drink more liquid than usual. Liquid will help to keep your air passages moist and help you cough up mucus. Ask how much liquid to drink each day and which liquids are best for you.

## 2025-05-22 NOTE — ED PROVIDER NOTE - OBJECTIVE STATEMENT
54-year-old female past medical history significant for hyperthyroidism, COPD (not on home oxygen), active smoker, GERD, complaining of shortness of breath since last night.  Patient reports symptoms typical of her COPD exacerbations in the past.  Patient with prior hospital visits for COPD, no intubations.  No cough or URI symptoms.  No fever or chills.  No abdominal pain or back pain.  Patient reports no relief at home with her nebulizer.  Vitals noted. states symptoms started after walking in the rain. triage note states EMS gave 12 of decadron.

## 2025-05-22 NOTE — ED PROVIDER NOTE - ATTENDING CONTRIBUTION TO CARE
54-year-old female past medical history significant for hyperthyroidism, COPD (not on home oxygen), active smoker, GERD, complaining of shortness of breath since last night.  Patient reports symptoms typical of her prior COPD exacerbations.  Patient with prior hospital visits for COPD, no intubations.  No cough or URI symptoms.  No fever or chills.  No abdominal pain or back pain.  Patient reports no relief at home with her nebulizer.  Vitals noted.  CONSTITUTIONAL: Well-appearing; thin, in no apparent distress.   HEAD: Normocephalic; atraumatic.   EYES: PERRL; EOM intact. Conjunctiva normal B/L.   ENT: Normal pharynx with no tonsillar hypertrophy. MMM.  NECK: Supple; non-tender; no cervical lymphadenopathy.   CHEST: Normal chest excursion with respiration.   CARDIOVASCULAR: Normal S1, S2; no murmurs, rubs, or gallops.   RESPIRATORY: B/L rhonchi and wheezing.   GI/: Normal bowel sounds; non-distended; non-tender.  BACK: No evidence of trauma or deformity. Non-tender to palpation. No CVA tenderness.   EXT: Normal ROM in all four extremities; non-tender to palpation; distal pulses are normal. No leg edema B/L.

## 2025-05-30 ENCOUNTER — OUTPATIENT (OUTPATIENT)
Dept: OUTPATIENT SERVICES | Facility: HOSPITAL | Age: 55
LOS: 1 days | End: 2025-05-30
Payer: MEDICAID

## 2025-05-30 ENCOUNTER — APPOINTMENT (OUTPATIENT)
Dept: INTERNAL MEDICINE | Facility: CLINIC | Age: 55
End: 2025-05-30
Payer: MEDICAID

## 2025-05-30 VITALS
WEIGHT: 116 LBS | OXYGEN SATURATION: 96 % | HEIGHT: 65 IN | DIASTOLIC BLOOD PRESSURE: 76 MMHG | SYSTOLIC BLOOD PRESSURE: 127 MMHG | TEMPERATURE: 97.8 F | HEART RATE: 106 BPM | BODY MASS INDEX: 19.33 KG/M2

## 2025-05-30 DIAGNOSIS — Z00.00 ENCOUNTER FOR GENERAL ADULT MEDICAL EXAMINATION WITHOUT ABNORMAL FINDINGS: ICD-10-CM

## 2025-05-30 DIAGNOSIS — J44.9 CHRONIC OBSTRUCTIVE PULMONARY DISEASE, UNSPECIFIED: ICD-10-CM

## 2025-05-30 DIAGNOSIS — E05.90 THYROTOXICOSIS, UNSPECIFIED W/OUT THYROTOXIC CRISIS OR STORM: ICD-10-CM

## 2025-05-30 DIAGNOSIS — K46.9 UNSPECIFIED ABDOMINAL HERNIA WITHOUT OBSTRUCTION OR GANGRENE: Chronic | ICD-10-CM

## 2025-05-30 PROCEDURE — G2211 COMPLEX E/M VISIT ADD ON: CPT | Mod: NC

## 2025-05-30 PROCEDURE — 99214 OFFICE O/P EST MOD 30 MIN: CPT

## 2025-06-05 DIAGNOSIS — J44.9 CHRONIC OBSTRUCTIVE PULMONARY DISEASE, UNSPECIFIED: ICD-10-CM

## 2025-06-05 DIAGNOSIS — E05.90 THYROTOXICOSIS, UNSPECIFIED WITHOUT THYROTOXIC CRISIS OR STORM: ICD-10-CM

## 2025-06-17 ENCOUNTER — NON-APPOINTMENT (OUTPATIENT)
Age: 55
End: 2025-06-17

## 2025-06-17 PROBLEM — R63.4 WEIGHT LOSS: Status: ACTIVE | Noted: 2025-06-17

## 2025-06-21 ENCOUNTER — EMERGENCY (EMERGENCY)
Facility: HOSPITAL | Age: 55
LOS: 0 days | Discharge: HOME | End: 2025-06-21
Attending: EMERGENCY MEDICINE
Payer: MEDICAID

## 2025-06-21 VITALS
WEIGHT: 121.92 LBS | RESPIRATION RATE: 18 BRPM | DIASTOLIC BLOOD PRESSURE: 86 MMHG | HEART RATE: 99 BPM | TEMPERATURE: 98 F | SYSTOLIC BLOOD PRESSURE: 128 MMHG | OXYGEN SATURATION: 100 %

## 2025-06-21 DIAGNOSIS — J44.1 CHRONIC OBSTRUCTIVE PULMONARY DISEASE WITH (ACUTE) EXACERBATION: ICD-10-CM

## 2025-06-21 DIAGNOSIS — K21.9 GASTRO-ESOPHAGEAL REFLUX DISEASE WITHOUT ESOPHAGITIS: ICD-10-CM

## 2025-06-21 DIAGNOSIS — Z91.041 RADIOGRAPHIC DYE ALLERGY STATUS: ICD-10-CM

## 2025-06-21 DIAGNOSIS — F17.200 NICOTINE DEPENDENCE, UNSPECIFIED, UNCOMPLICATED: ICD-10-CM

## 2025-06-21 DIAGNOSIS — E03.9 HYPOTHYROIDISM, UNSPECIFIED: ICD-10-CM

## 2025-06-21 DIAGNOSIS — R06.02 SHORTNESS OF BREATH: ICD-10-CM

## 2025-06-21 DIAGNOSIS — Z79.890 HORMONE REPLACEMENT THERAPY: ICD-10-CM

## 2025-06-21 DIAGNOSIS — K46.9 UNSPECIFIED ABDOMINAL HERNIA WITHOUT OBSTRUCTION OR GANGRENE: Chronic | ICD-10-CM

## 2025-06-21 LAB
ALBUMIN SERPL ELPH-MCNC: 4.4 G/DL — SIGNIFICANT CHANGE UP (ref 3.5–5.2)
ALP SERPL-CCNC: 79 U/L — SIGNIFICANT CHANGE UP (ref 30–115)
ALT FLD-CCNC: 11 U/L — SIGNIFICANT CHANGE UP (ref 0–41)
ANION GAP SERPL CALC-SCNC: 12 MMOL/L — SIGNIFICANT CHANGE UP (ref 7–14)
APPEARANCE UR: CLEAR — SIGNIFICANT CHANGE UP
AST SERPL-CCNC: 15 U/L — SIGNIFICANT CHANGE UP (ref 0–41)
BASOPHILS # BLD AUTO: 0.03 K/UL — SIGNIFICANT CHANGE UP (ref 0–0.2)
BASOPHILS NFR BLD AUTO: 0.6 % — SIGNIFICANT CHANGE UP (ref 0–1)
BILIRUB SERPL-MCNC: 0.3 MG/DL — SIGNIFICANT CHANGE UP (ref 0.2–1.2)
BILIRUB UR-MCNC: NEGATIVE — SIGNIFICANT CHANGE UP
BUN SERPL-MCNC: 6 MG/DL — LOW (ref 10–20)
CALCIUM SERPL-MCNC: 9.5 MG/DL — SIGNIFICANT CHANGE UP (ref 8.4–10.5)
CHLORIDE SERPL-SCNC: 105 MMOL/L — SIGNIFICANT CHANGE UP (ref 98–110)
CO2 SERPL-SCNC: 25 MMOL/L — SIGNIFICANT CHANGE UP (ref 17–32)
COLOR SPEC: YELLOW — SIGNIFICANT CHANGE UP
CREAT SERPL-MCNC: 0.7 MG/DL — SIGNIFICANT CHANGE UP (ref 0.7–1.5)
DIFF PNL FLD: NEGATIVE — SIGNIFICANT CHANGE UP
EGFR: 101 ML/MIN/1.73M2 — SIGNIFICANT CHANGE UP
EGFR: 101 ML/MIN/1.73M2 — SIGNIFICANT CHANGE UP
EOSINOPHIL # BLD AUTO: 0.21 K/UL — SIGNIFICANT CHANGE UP (ref 0–0.7)
EOSINOPHIL NFR BLD AUTO: 3.9 % — SIGNIFICANT CHANGE UP (ref 0–8)
GAS PNL BLDV: SIGNIFICANT CHANGE UP
GLUCOSE SERPL-MCNC: 133 MG/DL — HIGH (ref 70–99)
GLUCOSE UR QL: NEGATIVE MG/DL — SIGNIFICANT CHANGE UP
HCT VFR BLD CALC: 40.9 % — SIGNIFICANT CHANGE UP (ref 37–47)
HGB BLD-MCNC: 13.4 G/DL — SIGNIFICANT CHANGE UP (ref 12–16)
IMM GRANULOCYTES NFR BLD AUTO: 0.2 % — SIGNIFICANT CHANGE UP (ref 0.1–0.3)
KETONES UR QL: NEGATIVE MG/DL — SIGNIFICANT CHANGE UP
LEUKOCYTE ESTERASE UR-ACNC: ABNORMAL
LYMPHOCYTES # BLD AUTO: 1.58 K/UL — SIGNIFICANT CHANGE UP (ref 1.2–3.4)
LYMPHOCYTES # BLD AUTO: 29.2 % — SIGNIFICANT CHANGE UP (ref 20.5–51.1)
MCHC RBC-ENTMCNC: 31.9 PG — HIGH (ref 27–31)
MCHC RBC-ENTMCNC: 32.8 G/DL — SIGNIFICANT CHANGE UP (ref 32–37)
MCV RBC AUTO: 97.4 FL — SIGNIFICANT CHANGE UP (ref 81–99)
MONOCYTES # BLD AUTO: 0.18 K/UL — SIGNIFICANT CHANGE UP (ref 0.1–0.6)
MONOCYTES NFR BLD AUTO: 3.3 % — SIGNIFICANT CHANGE UP (ref 1.7–9.3)
NEUTROPHILS # BLD AUTO: 3.4 K/UL — SIGNIFICANT CHANGE UP (ref 1.4–6.5)
NEUTROPHILS NFR BLD AUTO: 62.8 % — SIGNIFICANT CHANGE UP (ref 42.2–75.2)
NITRITE UR-MCNC: NEGATIVE — SIGNIFICANT CHANGE UP
NRBC BLD AUTO-RTO: 0 /100 WBCS — SIGNIFICANT CHANGE UP (ref 0–0)
NT-PROBNP SERPL-SCNC: <36 PG/ML — SIGNIFICANT CHANGE UP (ref 0–300)
PH UR: 7.5 — SIGNIFICANT CHANGE UP (ref 5–8)
PLATELET # BLD AUTO: 313 K/UL — SIGNIFICANT CHANGE UP (ref 130–400)
PMV BLD: 10.2 FL — SIGNIFICANT CHANGE UP (ref 7.4–10.4)
POTASSIUM SERPL-MCNC: 4.5 MMOL/L — SIGNIFICANT CHANGE UP (ref 3.5–5)
POTASSIUM SERPL-SCNC: 4.5 MMOL/L — SIGNIFICANT CHANGE UP (ref 3.5–5)
PROT SERPL-MCNC: 7.1 G/DL — SIGNIFICANT CHANGE UP (ref 6–8)
PROT UR-MCNC: NEGATIVE MG/DL — SIGNIFICANT CHANGE UP
RBC # BLD: 4.2 M/UL — SIGNIFICANT CHANGE UP (ref 4.2–5.4)
RBC # FLD: 13.3 % — SIGNIFICANT CHANGE UP (ref 11.5–14.5)
SODIUM SERPL-SCNC: 142 MMOL/L — SIGNIFICANT CHANGE UP (ref 135–146)
SP GR SPEC: 1.02 — SIGNIFICANT CHANGE UP (ref 1–1.03)
TROPONIN T, HIGH SENSITIVITY RESULT: 8 NG/L — SIGNIFICANT CHANGE UP (ref 6–13)
TROPONIN T, HIGH SENSITIVITY RESULT: 8 NG/L — SIGNIFICANT CHANGE UP (ref 6–13)
UROBILINOGEN FLD QL: 0.2 MG/DL — SIGNIFICANT CHANGE UP (ref 0.2–1)
WBC # BLD: 5.41 K/UL — SIGNIFICANT CHANGE UP (ref 4.8–10.8)
WBC # FLD AUTO: 5.41 K/UL — SIGNIFICANT CHANGE UP (ref 4.8–10.8)

## 2025-06-21 PROCEDURE — 93010 ELECTROCARDIOGRAM REPORT: CPT

## 2025-06-21 PROCEDURE — 83605 ASSAY OF LACTIC ACID: CPT

## 2025-06-21 PROCEDURE — 81001 URINALYSIS AUTO W/SCOPE: CPT

## 2025-06-21 PROCEDURE — 84295 ASSAY OF SERUM SODIUM: CPT

## 2025-06-21 PROCEDURE — 99285 EMERGENCY DEPT VISIT HI MDM: CPT | Mod: 25

## 2025-06-21 PROCEDURE — 71045 X-RAY EXAM CHEST 1 VIEW: CPT | Mod: 26

## 2025-06-21 PROCEDURE — 93005 ELECTROCARDIOGRAM TRACING: CPT

## 2025-06-21 PROCEDURE — 85014 HEMATOCRIT: CPT

## 2025-06-21 PROCEDURE — 84484 ASSAY OF TROPONIN QUANT: CPT

## 2025-06-21 PROCEDURE — 84132 ASSAY OF SERUM POTASSIUM: CPT

## 2025-06-21 PROCEDURE — 85018 HEMOGLOBIN: CPT

## 2025-06-21 PROCEDURE — 82330 ASSAY OF CALCIUM: CPT

## 2025-06-21 PROCEDURE — 96375 TX/PRO/DX INJ NEW DRUG ADDON: CPT

## 2025-06-21 PROCEDURE — 82803 BLOOD GASES ANY COMBINATION: CPT

## 2025-06-21 PROCEDURE — 83880 ASSAY OF NATRIURETIC PEPTIDE: CPT

## 2025-06-21 PROCEDURE — 80053 COMPREHEN METABOLIC PANEL: CPT

## 2025-06-21 PROCEDURE — 85025 COMPLETE CBC W/AUTO DIFF WBC: CPT

## 2025-06-21 PROCEDURE — 71045 X-RAY EXAM CHEST 1 VIEW: CPT

## 2025-06-21 PROCEDURE — 99285 EMERGENCY DEPT VISIT HI MDM: CPT

## 2025-06-21 PROCEDURE — 94640 AIRWAY INHALATION TREATMENT: CPT

## 2025-06-21 PROCEDURE — 36415 COLL VENOUS BLD VENIPUNCTURE: CPT

## 2025-06-21 PROCEDURE — 96374 THER/PROPH/DIAG INJ IV PUSH: CPT

## 2025-06-21 RX ORDER — METHYLPREDNISOLONE ACETATE 80 MG/ML
125 INJECTION, SUSPENSION INTRA-ARTICULAR; INTRALESIONAL; INTRAMUSCULAR; SOFT TISSUE ONCE
Refills: 0 | Status: COMPLETED | OUTPATIENT
Start: 2025-06-21 | End: 2025-06-21

## 2025-06-21 RX ORDER — IPRATROPIUM BROMIDE AND ALBUTEROL SULFATE .5; 2.5 MG/3ML; MG/3ML
3 SOLUTION RESPIRATORY (INHALATION) ONCE
Refills: 0 | Status: COMPLETED | OUTPATIENT
Start: 2025-06-21 | End: 2025-06-21

## 2025-06-21 RX ORDER — MAGNESIUM SULFATE 500 MG/ML
2 SYRINGE (ML) INJECTION ONCE
Refills: 0 | Status: COMPLETED | OUTPATIENT
Start: 2025-06-21 | End: 2025-06-21

## 2025-06-21 RX ORDER — PREDNISONE 20 MG/1
1 TABLET ORAL
Qty: 5 | Refills: 0
Start: 2025-06-21 | End: 2025-06-25

## 2025-06-21 RX ADMIN — IPRATROPIUM BROMIDE AND ALBUTEROL SULFATE 3 MILLILITER(S): .5; 2.5 SOLUTION RESPIRATORY (INHALATION) at 11:10

## 2025-06-21 RX ADMIN — IPRATROPIUM BROMIDE AND ALBUTEROL SULFATE 3 MILLILITER(S): .5; 2.5 SOLUTION RESPIRATORY (INHALATION) at 11:11

## 2025-06-21 RX ADMIN — METHYLPREDNISOLONE ACETATE 125 MILLIGRAM(S): 80 INJECTION, SUSPENSION INTRA-ARTICULAR; INTRALESIONAL; INTRAMUSCULAR; SOFT TISSUE at 11:10

## 2025-06-21 RX ADMIN — Medication 150 GRAM(S): at 11:10

## 2025-07-11 ENCOUNTER — OUTPATIENT (OUTPATIENT)
Dept: OUTPATIENT SERVICES | Facility: HOSPITAL | Age: 55
LOS: 1 days | End: 2025-07-11
Payer: MEDICAID

## 2025-07-11 ENCOUNTER — APPOINTMENT (OUTPATIENT)
Dept: PULMONOLOGY | Facility: CLINIC | Age: 55
End: 2025-07-11

## 2025-07-11 VITALS
WEIGHT: 115 LBS | OXYGEN SATURATION: 98 % | HEIGHT: 65 IN | DIASTOLIC BLOOD PRESSURE: 71 MMHG | BODY MASS INDEX: 19.16 KG/M2 | HEART RATE: 86 BPM | TEMPERATURE: 97.4 F | SYSTOLIC BLOOD PRESSURE: 108 MMHG

## 2025-07-11 DIAGNOSIS — K46.9 UNSPECIFIED ABDOMINAL HERNIA WITHOUT OBSTRUCTION OR GANGRENE: Chronic | ICD-10-CM

## 2025-07-11 DIAGNOSIS — Z00.00 ENCOUNTER FOR GENERAL ADULT MEDICAL EXAMINATION WITHOUT ABNORMAL FINDINGS: ICD-10-CM

## 2025-07-11 PROBLEM — Z72.0 TOBACCO ABUSE: Status: ACTIVE | Noted: 2025-07-11

## 2025-07-11 PROCEDURE — G2211 COMPLEX E/M VISIT ADD ON: CPT | Mod: NC

## 2025-07-11 PROCEDURE — 99214 OFFICE O/P EST MOD 30 MIN: CPT

## 2025-07-16 DIAGNOSIS — R06.01 ORTHOPNEA: ICD-10-CM

## 2025-07-16 DIAGNOSIS — Z72.0 TOBACCO USE: ICD-10-CM

## 2025-07-16 DIAGNOSIS — R09.82 POSTNASAL DRIP: ICD-10-CM

## 2025-07-16 DIAGNOSIS — J44.9 CHRONIC OBSTRUCTIVE PULMONARY DISEASE, UNSPECIFIED: ICD-10-CM

## 2025-08-25 ENCOUNTER — APPOINTMENT (OUTPATIENT)
Dept: OBGYN | Facility: CLINIC | Age: 55
End: 2025-08-25